# Patient Record
Sex: FEMALE | Race: OTHER | NOT HISPANIC OR LATINO | URBAN - METROPOLITAN AREA
[De-identification: names, ages, dates, MRNs, and addresses within clinical notes are randomized per-mention and may not be internally consistent; named-entity substitution may affect disease eponyms.]

---

## 2020-07-14 ENCOUNTER — INPATIENT (INPATIENT)
Facility: HOSPITAL | Age: 80
LOS: 13 days | Discharge: HOME HEALTH | DRG: 291 | End: 2020-07-28
Attending: HOSPITALIST | Admitting: STUDENT IN AN ORGANIZED HEALTH CARE EDUCATION/TRAINING PROGRAM
Payer: COMMERCIAL

## 2020-07-14 VITALS
OXYGEN SATURATION: 84 % | HEIGHT: 65 IN | TEMPERATURE: 98 F | SYSTOLIC BLOOD PRESSURE: 90 MMHG | DIASTOLIC BLOOD PRESSURE: 54 MMHG | HEART RATE: 82 BPM | RESPIRATION RATE: 24 BRPM

## 2020-07-14 PROCEDURE — 99053 MED SERV 10PM-8AM 24 HR FAC: CPT

## 2020-07-14 PROCEDURE — 99291 CRITICAL CARE FIRST HOUR: CPT

## 2020-07-14 PROCEDURE — 71045 X-RAY EXAM CHEST 1 VIEW: CPT | Mod: 26

## 2020-07-14 PROCEDURE — 93010 ELECTROCARDIOGRAM REPORT: CPT

## 2020-07-14 RX ORDER — PIPERACILLIN AND TAZOBACTAM 4; .5 G/20ML; G/20ML
3.38 INJECTION, POWDER, LYOPHILIZED, FOR SOLUTION INTRAVENOUS ONCE
Refills: 0 | Status: COMPLETED | OUTPATIENT
Start: 2020-07-14 | End: 2020-07-14

## 2020-07-14 NOTE — ED ADULT NURSE NOTE - NSIMPLEMENTINTERV_GEN_ALL_ED
Implemented All Fall Risk Interventions:  [place] to call system. Call bell, personal items and telephone within reach. Instruct patient to call for assistance. Room bathroom lighting operational. Non-slip footwear when patient is off stretcher. Physically safe environment: no spills, clutter or unnecessary equipment. Stretcher in lowest position, wheels locked, appropriate side rails in place. Provide visual cue, wrist band, yellow gown, etc. Monitor gait and stability. Monitor for mental status changes and reorient to person, place, and time. Review medications for side effects contributing to fall risk. Reinforce activity limits and safety measures with patient and family.

## 2020-07-14 NOTE — ED PROVIDER NOTE - PROGRESS NOTE DETAILS
Patient more alert, but agitated on BiPap; now able to say some words in her language. MICU consulted given persistent encephalopathy and agitation on BiPaP. Attempted to call son x 2 to discuss advance directives/goals of care but number on file reaches his voicemail. accepted by MICU requesting second covid swab

## 2020-07-14 NOTE — ED PROVIDER NOTE - OBJECTIVE STATEMENT
81 y/o F hx of COPD, DM, schizoaffective, HTN, ?CHF presenting with altered mental status.    History obtained by son at bedside. Patient at baseline dependent on ADLs, but today was more lethargic and altered and could not respond appropriately or ambulate. No fevers, chills as per son.     Medications: Metformin, lisinopril, spironolactone, Toprol, risperidal, seroquel, triphenyxamine. 79 y/o F hx of COPD, DM, schizoaffective, HTN, CHF presenting with altered mental status.    History obtained by son at bedside. Patient at baseline dependent on ADLs, but today was more lethargic and altered and could not respond appropriately or ambulate. No fevers, chills as per son, N/V, diarrhea, dysuria or cough.     Medications: Metformin, lisinopril, spironolactone, Toprol, risperidal, seroquel, triphenyxamine.

## 2020-07-14 NOTE — ED PROVIDER NOTE - PHYSICAL EXAMINATION
GENERAL: obtunded; grimaces to painful stimuli  HEAD:  Atraumatic, Normocephalic  EYES: resists opening of eyes   ENT: MMM; oropharynx clear  NECK: Supple, No JVD  CHEST/LUNG: Bibasilar rales.   HEART: Regular rate and rhythm; No murmurs, rubs, or gallops  ABDOMEN: Soft, +distensoin, midline laparatomy incision.   EXTREMITIES:  2+ Peripheral Pulses, trace LE edema.   PSYCH: AAOx0  NEUROLOGY: moves all extremities to painful stimuli   SKIN: No rashes or lesions

## 2020-07-14 NOTE — ED PROVIDER NOTE - PMH
Congestive heart failure    COPD (chronic obstructive pulmonary disease)    Diabetes mellitus    Hypertension    Schizoaffective disorder

## 2020-07-14 NOTE — ED ADULT NURSE NOTE - OBJECTIVE STATEMENT
Pt is a 80 Y legally blind F with hx of COPD on 2 L NC, HTN, DM, CHF presenting to ED from home accompanied by son with c/o alerted mental status since this AM. Pt Farsi speaking. Pt arrives to ED breathing unlabored on 2 L NC. Abd distended, non-tender. Skin is warm and dry. No diaphoresis noted. + peripheral pulses. Pt afebrile rectally. 18 G IV established to L AC. Pt placed on continuous cardiac/pulse ox/BP monitoring. Pt diaper found to be soiled. Pt cleaned, new diaper/linens applied. Safety and comfort maintained.

## 2020-07-14 NOTE — ED PROVIDER NOTE - CLINICAL SUMMARY MEDICAL DECISION MAKING FREE TEXT BOX
79 y/o F hx of COPD, DM, schizoaffective, HTN, ?CHF presenting with altered mental status.  Suspect sepsis, oversedation from psychotropic medications, intracranial insult or other metabolic derangement.  Plan for EKG, screening labs including tox, panculture, CXR, CT head & a/p and admit.  Low threshold for MICU consult if clinically worsening.

## 2020-07-14 NOTE — ED PROVIDER NOTE - CARE PLAN
Principal Discharge DX:	Acute on chronic congestive heart failure, unspecified heart failure type  Secondary Diagnosis:	Hypercarbia  Secondary Diagnosis:	Closed nondisplaced fracture of posterior arch of first cervical vertebra, initial encounter

## 2020-07-14 NOTE — ED PROVIDER NOTE - ATTENDING CONTRIBUTION TO CARE
pt presented with AMS, generalized weakness for at least 12 hrs (out of tpa window). on exam, non-focal  neuro exam without deficits, but pt somnolent and somewhat altered. initially hypoxic on RA, corrected with NC. workup significant for hypercarbia and resp acidosis - placed on BIPAP with improvement of MS. CT head shows age indeterminate C1 fx - pt placed in c-collar and had dedicated cervical spine CT done. NS evaluated and recommends no emergent intervention. repeat blood gas with improving hypercarbia. CT chest shows effusions and some edema. bnp very elevated. likely chf component along with hypercarbia. will admit - awiating MICU eval.

## 2020-07-14 NOTE — ED PROVIDER NOTE - SECONDARY DIAGNOSIS.
Hypercarbia Closed nondisplaced fracture of posterior arch of first cervical vertebra, initial encounter

## 2020-07-15 DIAGNOSIS — I50.9 HEART FAILURE, UNSPECIFIED: ICD-10-CM

## 2020-07-15 DIAGNOSIS — Z98.890 OTHER SPECIFIED POSTPROCEDURAL STATES: Chronic | ICD-10-CM

## 2020-07-15 LAB
-  COAGULASE NEGATIVE STAPHYLOCOCCUS: SIGNIFICANT CHANGE UP
ALBUMIN SERPL ELPH-MCNC: 2.7 G/DL — LOW (ref 3.3–5)
ALBUMIN SERPL ELPH-MCNC: 2.9 G/DL — LOW (ref 3.3–5)
ALBUMIN SERPL ELPH-MCNC: 3 G/DL — LOW (ref 3.3–5)
ALP SERPL-CCNC: 80 U/L — SIGNIFICANT CHANGE UP (ref 40–120)
ALP SERPL-CCNC: 86 U/L — SIGNIFICANT CHANGE UP (ref 40–120)
ALP SERPL-CCNC: 86 U/L — SIGNIFICANT CHANGE UP (ref 40–120)
ALT FLD-CCNC: 13 U/L — SIGNIFICANT CHANGE UP (ref 10–45)
ALT FLD-CCNC: 13 U/L — SIGNIFICANT CHANGE UP (ref 10–45)
ALT FLD-CCNC: 17 U/L — SIGNIFICANT CHANGE UP (ref 10–45)
AMMONIA BLD-MCNC: 30 UMOL/L — SIGNIFICANT CHANGE UP (ref 11–55)
ANION GAP SERPL CALC-SCNC: 14 MMOL/L — SIGNIFICANT CHANGE UP (ref 5–17)
ANION GAP SERPL CALC-SCNC: 15 MMOL/L — SIGNIFICANT CHANGE UP (ref 5–17)
ANION GAP SERPL CALC-SCNC: 15 MMOL/L — SIGNIFICANT CHANGE UP (ref 5–17)
APAP SERPL-MCNC: <15 UG/ML — SIGNIFICANT CHANGE UP (ref 10–30)
APPEARANCE UR: CLEAR — SIGNIFICANT CHANGE UP
APTT BLD: 34.6 SEC — SIGNIFICANT CHANGE UP (ref 27.5–35.5)
AST SERPL-CCNC: 12 U/L — SIGNIFICANT CHANGE UP (ref 10–40)
AST SERPL-CCNC: 13 U/L — SIGNIFICANT CHANGE UP (ref 10–40)
AST SERPL-CCNC: 18 U/L — SIGNIFICANT CHANGE UP (ref 10–40)
BASOPHILS # BLD AUTO: 0.03 K/UL — SIGNIFICANT CHANGE UP (ref 0–0.2)
BASOPHILS # BLD AUTO: 0.05 K/UL — SIGNIFICANT CHANGE UP (ref 0–0.2)
BASOPHILS # BLD AUTO: 0.05 K/UL — SIGNIFICANT CHANGE UP (ref 0–0.2)
BASOPHILS NFR BLD AUTO: 0.3 % — SIGNIFICANT CHANGE UP (ref 0–2)
BASOPHILS NFR BLD AUTO: 0.5 % — SIGNIFICANT CHANGE UP (ref 0–2)
BASOPHILS NFR BLD AUTO: 0.5 % — SIGNIFICANT CHANGE UP (ref 0–2)
BILIRUB SERPL-MCNC: 0.1 MG/DL — LOW (ref 0.2–1.2)
BILIRUB SERPL-MCNC: 0.2 MG/DL — SIGNIFICANT CHANGE UP (ref 0.2–1.2)
BILIRUB SERPL-MCNC: 0.2 MG/DL — SIGNIFICANT CHANGE UP (ref 0.2–1.2)
BILIRUB UR-MCNC: NEGATIVE — SIGNIFICANT CHANGE UP
BUN SERPL-MCNC: 34 MG/DL — HIGH (ref 7–23)
BUN SERPL-MCNC: 34 MG/DL — HIGH (ref 7–23)
BUN SERPL-MCNC: 35 MG/DL — HIGH (ref 7–23)
CALCIUM SERPL-MCNC: 8.4 MG/DL — SIGNIFICANT CHANGE UP (ref 8.4–10.5)
CALCIUM SERPL-MCNC: 8.6 MG/DL — SIGNIFICANT CHANGE UP (ref 8.4–10.5)
CALCIUM SERPL-MCNC: 8.8 MG/DL — SIGNIFICANT CHANGE UP (ref 8.4–10.5)
CHLORIDE SERPL-SCNC: 104 MMOL/L — SIGNIFICANT CHANGE UP (ref 96–108)
CHLORIDE SERPL-SCNC: 104 MMOL/L — SIGNIFICANT CHANGE UP (ref 96–108)
CHLORIDE SERPL-SCNC: 105 MMOL/L — SIGNIFICANT CHANGE UP (ref 96–108)
CK MB CFR SERPL CALC: 2.3 NG/ML — SIGNIFICANT CHANGE UP (ref 0–3.8)
CK SERPL-CCNC: 27 U/L — SIGNIFICANT CHANGE UP (ref 25–170)
CO2 SERPL-SCNC: 22 MMOL/L — SIGNIFICANT CHANGE UP (ref 22–31)
CO2 SERPL-SCNC: 22 MMOL/L — SIGNIFICANT CHANGE UP (ref 22–31)
CO2 SERPL-SCNC: 24 MMOL/L — SIGNIFICANT CHANGE UP (ref 22–31)
COLOR SPEC: YELLOW — SIGNIFICANT CHANGE UP
CREAT SERPL-MCNC: 1.19 MG/DL — SIGNIFICANT CHANGE UP (ref 0.5–1.3)
CREAT SERPL-MCNC: 1.27 MG/DL — SIGNIFICANT CHANGE UP (ref 0.5–1.3)
CREAT SERPL-MCNC: 1.32 MG/DL — HIGH (ref 0.5–1.3)
DIFF PNL FLD: NEGATIVE — SIGNIFICANT CHANGE UP
EOSINOPHIL # BLD AUTO: 0.14 K/UL — SIGNIFICANT CHANGE UP (ref 0–0.5)
EOSINOPHIL # BLD AUTO: 0.27 K/UL — SIGNIFICANT CHANGE UP (ref 0–0.5)
EOSINOPHIL # BLD AUTO: 0.32 K/UL — SIGNIFICANT CHANGE UP (ref 0–0.5)
EOSINOPHIL NFR BLD AUTO: 1.4 % — SIGNIFICANT CHANGE UP (ref 0–6)
EOSINOPHIL NFR BLD AUTO: 2.5 % — SIGNIFICANT CHANGE UP (ref 0–6)
EOSINOPHIL NFR BLD AUTO: 3.2 % — SIGNIFICANT CHANGE UP (ref 0–6)
ETHANOL SERPL-MCNC: SIGNIFICANT CHANGE UP MG/DL (ref 0–10)
GAS PNL BLDA: SIGNIFICANT CHANGE UP
GAS PNL BLDV: SIGNIFICANT CHANGE UP
GAS PNL BLDV: SIGNIFICANT CHANGE UP
GLUCOSE BLDC GLUCOMTR-MCNC: 148 MG/DL — HIGH (ref 70–99)
GLUCOSE BLDC GLUCOMTR-MCNC: 154 MG/DL — HIGH (ref 70–99)
GLUCOSE SERPL-MCNC: 103 MG/DL — HIGH (ref 70–99)
GLUCOSE SERPL-MCNC: 112 MG/DL — HIGH (ref 70–99)
GLUCOSE SERPL-MCNC: 168 MG/DL — HIGH (ref 70–99)
GLUCOSE UR QL: NEGATIVE — SIGNIFICANT CHANGE UP
GRAM STN FLD: SIGNIFICANT CHANGE UP
HCT VFR BLD CALC: 33.3 % — LOW (ref 34.5–45)
HCT VFR BLD CALC: 35.3 % — SIGNIFICANT CHANGE UP (ref 34.5–45)
HCT VFR BLD CALC: 37 % — SIGNIFICANT CHANGE UP (ref 34.5–45)
HGB BLD-MCNC: 10.1 G/DL — LOW (ref 11.5–15.5)
HGB BLD-MCNC: 10.5 G/DL — LOW (ref 11.5–15.5)
HGB BLD-MCNC: 9.6 G/DL — LOW (ref 11.5–15.5)
IMM GRANULOCYTES NFR BLD AUTO: 0.5 % — SIGNIFICANT CHANGE UP (ref 0–1.5)
IMM GRANULOCYTES NFR BLD AUTO: 0.6 % — SIGNIFICANT CHANGE UP (ref 0–1.5)
IMM GRANULOCYTES NFR BLD AUTO: 0.6 % — SIGNIFICANT CHANGE UP (ref 0–1.5)
INR BLD: 1.09 RATIO — SIGNIFICANT CHANGE UP (ref 0.88–1.16)
KETONES UR-MCNC: NEGATIVE — SIGNIFICANT CHANGE UP
LEUKOCYTE ESTERASE UR-ACNC: NEGATIVE — SIGNIFICANT CHANGE UP
LIDOCAIN IGE QN: 28 U/L — SIGNIFICANT CHANGE UP (ref 7–60)
LYMPHOCYTES # BLD AUTO: 1 K/UL — SIGNIFICANT CHANGE UP (ref 1–3.3)
LYMPHOCYTES # BLD AUTO: 1.01 K/UL — SIGNIFICANT CHANGE UP (ref 1–3.3)
LYMPHOCYTES # BLD AUTO: 1.12 K/UL — SIGNIFICANT CHANGE UP (ref 1–3.3)
LYMPHOCYTES # BLD AUTO: 10.2 % — LOW (ref 13–44)
LYMPHOCYTES # BLD AUTO: 10.5 % — LOW (ref 13–44)
LYMPHOCYTES # BLD AUTO: 9.9 % — LOW (ref 13–44)
MAGNESIUM SERPL-MCNC: 1.5 MG/DL — LOW (ref 1.6–2.6)
MAGNESIUM SERPL-MCNC: 1.7 MG/DL — SIGNIFICANT CHANGE UP (ref 1.6–2.6)
MAGNESIUM SERPL-MCNC: 1.9 MG/DL — SIGNIFICANT CHANGE UP (ref 1.6–2.6)
MCHC RBC-ENTMCNC: 24.5 PG — LOW (ref 27–34)
MCHC RBC-ENTMCNC: 24.8 PG — LOW (ref 27–34)
MCHC RBC-ENTMCNC: 25.1 PG — LOW (ref 27–34)
MCHC RBC-ENTMCNC: 28.4 GM/DL — LOW (ref 32–36)
MCHC RBC-ENTMCNC: 28.6 GM/DL — LOW (ref 32–36)
MCHC RBC-ENTMCNC: 28.8 GM/DL — LOW (ref 32–36)
MCV RBC AUTO: 84.9 FL — SIGNIFICANT CHANGE UP (ref 80–100)
MCV RBC AUTO: 86.7 FL — SIGNIFICANT CHANGE UP (ref 80–100)
MCV RBC AUTO: 88.5 FL — SIGNIFICANT CHANGE UP (ref 80–100)
METHOD TYPE: SIGNIFICANT CHANGE UP
MONOCYTES # BLD AUTO: 0.81 K/UL — SIGNIFICANT CHANGE UP (ref 0–0.9)
MONOCYTES # BLD AUTO: 0.84 K/UL — SIGNIFICANT CHANGE UP (ref 0–0.9)
MONOCYTES # BLD AUTO: 0.86 K/UL — SIGNIFICANT CHANGE UP (ref 0–0.9)
MONOCYTES NFR BLD AUTO: 8.1 % — SIGNIFICANT CHANGE UP (ref 2–14)
MONOCYTES NFR BLD AUTO: 8.2 % — SIGNIFICANT CHANGE UP (ref 2–14)
MONOCYTES NFR BLD AUTO: 8.3 % — SIGNIFICANT CHANGE UP (ref 2–14)
NEUTROPHILS # BLD AUTO: 7.68 K/UL — HIGH (ref 1.8–7.4)
NEUTROPHILS # BLD AUTO: 8.03 K/UL — HIGH (ref 1.8–7.4)
NEUTROPHILS # BLD AUTO: 8.28 K/UL — HIGH (ref 1.8–7.4)
NEUTROPHILS NFR BLD AUTO: 77.6 % — HIGH (ref 43–77)
NEUTROPHILS NFR BLD AUTO: 77.8 % — HIGH (ref 43–77)
NEUTROPHILS NFR BLD AUTO: 79.3 % — HIGH (ref 43–77)
NITRITE UR-MCNC: NEGATIVE — SIGNIFICANT CHANGE UP
NRBC # BLD: 0 /100 WBCS — SIGNIFICANT CHANGE UP (ref 0–0)
NT-PROBNP SERPL-SCNC: HIGH PG/ML (ref 0–300)
PH UR: 5.5 — SIGNIFICANT CHANGE UP (ref 5–8)
PHOSPHATE SERPL-MCNC: 4.3 MG/DL — SIGNIFICANT CHANGE UP (ref 2.5–4.5)
PHOSPHATE SERPL-MCNC: 4.8 MG/DL — HIGH (ref 2.5–4.5)
PHOSPHATE SERPL-MCNC: 5.3 MG/DL — HIGH (ref 2.5–4.5)
PLATELET # BLD AUTO: 243 K/UL — SIGNIFICANT CHANGE UP (ref 150–400)
PLATELET # BLD AUTO: 261 K/UL — SIGNIFICANT CHANGE UP (ref 150–400)
PLATELET # BLD AUTO: 292 K/UL — SIGNIFICANT CHANGE UP (ref 150–400)
POTASSIUM SERPL-MCNC: 3.8 MMOL/L — SIGNIFICANT CHANGE UP (ref 3.5–5.3)
POTASSIUM SERPL-MCNC: 4 MMOL/L — SIGNIFICANT CHANGE UP (ref 3.5–5.3)
POTASSIUM SERPL-MCNC: 4.3 MMOL/L — SIGNIFICANT CHANGE UP (ref 3.5–5.3)
POTASSIUM SERPL-SCNC: 3.8 MMOL/L — SIGNIFICANT CHANGE UP (ref 3.5–5.3)
POTASSIUM SERPL-SCNC: 4 MMOL/L — SIGNIFICANT CHANGE UP (ref 3.5–5.3)
POTASSIUM SERPL-SCNC: 4.3 MMOL/L — SIGNIFICANT CHANGE UP (ref 3.5–5.3)
PROT SERPL-MCNC: 5.9 G/DL — LOW (ref 6–8.3)
PROT SERPL-MCNC: 6.2 G/DL — SIGNIFICANT CHANGE UP (ref 6–8.3)
PROT SERPL-MCNC: 6.4 G/DL — SIGNIFICANT CHANGE UP (ref 6–8.3)
PROT UR-MCNC: ABNORMAL
PROTHROM AB SERPL-ACNC: 12.9 SEC — SIGNIFICANT CHANGE UP (ref 10.6–13.6)
RBC # BLD: 3.92 M/UL — SIGNIFICANT CHANGE UP (ref 3.8–5.2)
RBC # BLD: 4.07 M/UL — SIGNIFICANT CHANGE UP (ref 3.8–5.2)
RBC # BLD: 4.18 M/UL — SIGNIFICANT CHANGE UP (ref 3.8–5.2)
RBC # FLD: 16.7 % — HIGH (ref 10.3–14.5)
RBC # FLD: 16.8 % — HIGH (ref 10.3–14.5)
RBC # FLD: 16.8 % — HIGH (ref 10.3–14.5)
SALICYLATES SERPL-MCNC: <2 MG/DL — LOW (ref 15–30)
SARS-COV-2 IGG SERPL QL IA: NEGATIVE — SIGNIFICANT CHANGE UP
SARS-COV-2 IGM SERPL IA-ACNC: <3.8 AU/ML — SIGNIFICANT CHANGE UP
SARS-COV-2 RNA SPEC QL NAA+PROBE: SIGNIFICANT CHANGE UP
SARS-COV-2 RNA SPEC QL NAA+PROBE: SIGNIFICANT CHANGE UP
SODIUM SERPL-SCNC: 140 MMOL/L — SIGNIFICANT CHANGE UP (ref 135–145)
SODIUM SERPL-SCNC: 142 MMOL/L — SIGNIFICANT CHANGE UP (ref 135–145)
SODIUM SERPL-SCNC: 143 MMOL/L — SIGNIFICANT CHANGE UP (ref 135–145)
SP GR SPEC: 1.02 — SIGNIFICANT CHANGE UP (ref 1.01–1.02)
SPECIMEN SOURCE: SIGNIFICANT CHANGE UP
T3 SERPL-MCNC: 51 NG/DL — LOW (ref 80–200)
TROPONIN T, HIGH SENSITIVITY RESULT: 35 NG/L — SIGNIFICANT CHANGE UP (ref 0–51)
TROPONIN T, HIGH SENSITIVITY RESULT: 39 NG/L — SIGNIFICANT CHANGE UP (ref 0–51)
TROPONIN T, HIGH SENSITIVITY RESULT: 43 NG/L — SIGNIFICANT CHANGE UP (ref 0–51)
UROBILINOGEN FLD QL: NEGATIVE — SIGNIFICANT CHANGE UP
WBC # BLD: 10.12 K/UL — SIGNIFICANT CHANGE UP (ref 3.8–10.5)
WBC # BLD: 10.64 K/UL — HIGH (ref 3.8–10.5)
WBC # BLD: 9.9 K/UL — SIGNIFICANT CHANGE UP (ref 3.8–10.5)
WBC # FLD AUTO: 10.12 K/UL — SIGNIFICANT CHANGE UP (ref 3.8–10.5)
WBC # FLD AUTO: 10.64 K/UL — HIGH (ref 3.8–10.5)
WBC # FLD AUTO: 9.9 K/UL — SIGNIFICANT CHANGE UP (ref 3.8–10.5)

## 2020-07-15 PROCEDURE — 71260 CT THORAX DX C+: CPT | Mod: 26

## 2020-07-15 PROCEDURE — 99291 CRITICAL CARE FIRST HOUR: CPT | Mod: 25

## 2020-07-15 PROCEDURE — 76604 US EXAM CHEST: CPT | Mod: 26,GC

## 2020-07-15 PROCEDURE — 72125 CT NECK SPINE W/O DYE: CPT | Mod: 26

## 2020-07-15 PROCEDURE — 99291 CRITICAL CARE FIRST HOUR: CPT

## 2020-07-15 PROCEDURE — 74177 CT ABD & PELVIS W/CONTRAST: CPT | Mod: 26

## 2020-07-15 PROCEDURE — 70450 CT HEAD/BRAIN W/O DYE: CPT | Mod: 26

## 2020-07-15 PROCEDURE — 93308 TTE F-UP OR LMTD: CPT | Mod: 26,GC

## 2020-07-15 RX ORDER — DEXTROSE 50 % IN WATER 50 %
12.5 SYRINGE (ML) INTRAVENOUS ONCE
Refills: 0 | Status: DISCONTINUED | OUTPATIENT
Start: 2020-07-15 | End: 2020-07-22

## 2020-07-15 RX ORDER — BACITRACIN ZINC 500 UNIT/G
1 OINTMENT IN PACKET (EA) TOPICAL ONCE
Refills: 0 | Status: COMPLETED | OUTPATIENT
Start: 2020-07-15 | End: 2020-07-15

## 2020-07-15 RX ORDER — PANTOPRAZOLE SODIUM 20 MG/1
40 TABLET, DELAYED RELEASE ORAL DAILY
Refills: 0 | Status: DISCONTINUED | OUTPATIENT
Start: 2020-07-15 | End: 2020-07-22

## 2020-07-15 RX ORDER — GLUCAGON INJECTION, SOLUTION 0.5 MG/.1ML
1 INJECTION, SOLUTION SUBCUTANEOUS ONCE
Refills: 0 | Status: DISCONTINUED | OUTPATIENT
Start: 2020-07-15 | End: 2020-07-22

## 2020-07-15 RX ORDER — INSULIN LISPRO 100/ML
VIAL (ML) SUBCUTANEOUS AT BEDTIME
Refills: 0 | Status: DISCONTINUED | OUTPATIENT
Start: 2020-07-15 | End: 2020-07-15

## 2020-07-15 RX ORDER — CHLORHEXIDINE GLUCONATE 213 G/1000ML
1 SOLUTION TOPICAL
Refills: 0 | Status: DISCONTINUED | OUTPATIENT
Start: 2020-07-15 | End: 2020-07-19

## 2020-07-15 RX ORDER — AZITHROMYCIN 500 MG/1
500 TABLET, FILM COATED ORAL ONCE
Refills: 0 | Status: COMPLETED | OUTPATIENT
Start: 2020-07-15 | End: 2020-07-15

## 2020-07-15 RX ORDER — DEXTROSE 50 % IN WATER 50 %
25 SYRINGE (ML) INTRAVENOUS ONCE
Refills: 0 | Status: DISCONTINUED | OUTPATIENT
Start: 2020-07-15 | End: 2020-07-22

## 2020-07-15 RX ORDER — ALBUTEROL 90 UG/1
0 AEROSOL, METERED ORAL
Qty: 0 | Refills: 0 | DISCHARGE

## 2020-07-15 RX ORDER — FUROSEMIDE 40 MG
40 TABLET ORAL ONCE
Refills: 0 | Status: COMPLETED | OUTPATIENT
Start: 2020-07-15 | End: 2020-07-15

## 2020-07-15 RX ORDER — DEXMEDETOMIDINE HYDROCHLORIDE IN 0.9% SODIUM CHLORIDE 4 UG/ML
0.2 INJECTION INTRAVENOUS
Qty: 200 | Refills: 0 | Status: DISCONTINUED | OUTPATIENT
Start: 2020-07-15 | End: 2020-07-17

## 2020-07-15 RX ORDER — VANCOMYCIN HCL 1 G
1000 VIAL (EA) INTRAVENOUS ONCE
Refills: 0 | Status: COMPLETED | OUTPATIENT
Start: 2020-07-15 | End: 2020-07-15

## 2020-07-15 RX ORDER — MAGNESIUM SULFATE 500 MG/ML
2 VIAL (ML) INJECTION ONCE
Refills: 0 | Status: COMPLETED | OUTPATIENT
Start: 2020-07-15 | End: 2020-07-15

## 2020-07-15 RX ORDER — CEFEPIME 1 G/1
INJECTION, POWDER, FOR SOLUTION INTRAMUSCULAR; INTRAVENOUS
Refills: 0 | Status: DISCONTINUED | OUTPATIENT
Start: 2020-07-15 | End: 2020-07-15

## 2020-07-15 RX ORDER — HALOPERIDOL DECANOATE 100 MG/ML
1 INJECTION INTRAMUSCULAR EVERY 4 HOURS
Refills: 0 | Status: DISCONTINUED | OUTPATIENT
Start: 2020-07-15 | End: 2020-07-17

## 2020-07-15 RX ORDER — INSULIN LISPRO 100/ML
VIAL (ML) SUBCUTANEOUS
Refills: 0 | Status: DISCONTINUED | OUTPATIENT
Start: 2020-07-15 | End: 2020-07-15

## 2020-07-15 RX ORDER — NOREPINEPHRINE BITARTRATE/D5W 8 MG/250ML
0.05 PLASTIC BAG, INJECTION (ML) INTRAVENOUS
Qty: 8 | Refills: 0 | Status: DISCONTINUED | OUTPATIENT
Start: 2020-07-15 | End: 2020-07-17

## 2020-07-15 RX ORDER — VANCOMYCIN HCL 1 G
1000 VIAL (EA) INTRAVENOUS DAILY
Refills: 0 | Status: DISCONTINUED | OUTPATIENT
Start: 2020-07-15 | End: 2020-07-15

## 2020-07-15 RX ORDER — SODIUM CHLORIDE 9 MG/ML
1000 INJECTION, SOLUTION INTRAVENOUS
Refills: 0 | Status: DISCONTINUED | OUTPATIENT
Start: 2020-07-15 | End: 2020-07-22

## 2020-07-15 RX ORDER — DEXTROSE 50 % IN WATER 50 %
15 SYRINGE (ML) INTRAVENOUS ONCE
Refills: 0 | Status: DISCONTINUED | OUTPATIENT
Start: 2020-07-15 | End: 2020-07-22

## 2020-07-15 RX ORDER — INSULIN LISPRO 100/ML
VIAL (ML) SUBCUTANEOUS EVERY 6 HOURS
Refills: 0 | Status: DISCONTINUED | OUTPATIENT
Start: 2020-07-15 | End: 2020-07-22

## 2020-07-15 RX ORDER — CEFTRIAXONE 500 MG/1
1000 INJECTION, POWDER, FOR SOLUTION INTRAMUSCULAR; INTRAVENOUS EVERY 24 HOURS
Refills: 0 | Status: DISCONTINUED | OUTPATIENT
Start: 2020-07-15 | End: 2020-07-17

## 2020-07-15 RX ORDER — CEFEPIME 1 G/1
2000 INJECTION, POWDER, FOR SOLUTION INTRAMUSCULAR; INTRAVENOUS EVERY 12 HOURS
Refills: 0 | Status: DISCONTINUED | OUTPATIENT
Start: 2020-07-15 | End: 2020-07-15

## 2020-07-15 RX ORDER — CEFEPIME 1 G/1
2000 INJECTION, POWDER, FOR SOLUTION INTRAMUSCULAR; INTRAVENOUS ONCE
Refills: 0 | Status: DISCONTINUED | OUTPATIENT
Start: 2020-07-15 | End: 2020-07-15

## 2020-07-15 RX ADMIN — PANTOPRAZOLE SODIUM 40 MILLIGRAM(S): 20 TABLET, DELAYED RELEASE ORAL at 14:04

## 2020-07-15 RX ADMIN — Medication 1 APPLICATION(S): at 21:24

## 2020-07-15 RX ADMIN — HALOPERIDOL DECANOATE 1 MILLIGRAM(S): 100 INJECTION INTRAMUSCULAR at 09:23

## 2020-07-15 RX ADMIN — CHLORHEXIDINE GLUCONATE 1 APPLICATION(S): 213 SOLUTION TOPICAL at 09:55

## 2020-07-15 RX ADMIN — AZITHROMYCIN 250 MILLIGRAM(S): 500 TABLET, FILM COATED ORAL at 09:23

## 2020-07-15 RX ADMIN — PIPERACILLIN AND TAZOBACTAM 200 GRAM(S): 4; .5 INJECTION, POWDER, LYOPHILIZED, FOR SOLUTION INTRAVENOUS at 00:07

## 2020-07-15 RX ADMIN — CEFTRIAXONE 100 MILLIGRAM(S): 500 INJECTION, POWDER, FOR SOLUTION INTRAMUSCULAR; INTRAVENOUS at 09:26

## 2020-07-15 RX ADMIN — Medication 250 MILLIGRAM(S): at 21:59

## 2020-07-15 RX ADMIN — Medication 40 MILLIGRAM(S): at 01:44

## 2020-07-15 RX ADMIN — PIPERACILLIN AND TAZOBACTAM 3.38 GRAM(S): 4; .5 INJECTION, POWDER, LYOPHILIZED, FOR SOLUTION INTRAVENOUS at 01:37

## 2020-07-15 RX ADMIN — Medication 50 GRAM(S): at 02:22

## 2020-07-15 RX ADMIN — Medication 40 MILLIGRAM(S): at 09:33

## 2020-07-15 RX ADMIN — Medication 2 GRAM(S): at 03:22

## 2020-07-15 NOTE — H&P ADULT - NSHPLABSRESULTS_GEN_ALL_CORE
10.5   10.64 )-----------( 292      ( 14 Jul 2020 23:35 )             37.0       07-14    142  |  105  |  35<H>  ----------------------------<  168<H>  4.3   |  22  |  1.32<H>    Ca    8.4      14 Jul 2020 23:35  Phos  5.3     07-14  Mg     1.5     07-14    TPro  6.2  /  Alb  2.9<L>  /  TBili  0.2  /  DBili  x   /  AST  13  /  ALT  17  /  AlkPhos  86  07-14      Urinalysis (07.15.20 @ 00:34)    pH Urine: 5.5    Glucose Qualitative, Urine: Negative    Blood, Urine: Negative    Color: Yellow    Urine Appearance: Clear    Bilirubin: Negative    Ketone - Urine: Negative    Specific Gravity: 1.022    Protein, Urine: 30 mg/dL    Urobilinogen: Negative    Nitrite: Negative    Leukocyte Esterase Concentration: Negative      EXAM:  XR CHEST PORTABLE URGENT 1V                        PROCEDURE DATE:  07/14/2020      INTERPRETATION:  Bilateral patchy opacities may represent multifocal pneumonia or pulmonary edema.  Bilateral pleural effusions.        EXAM:  CT BRAIN                        PROCEDURE DATE:  07/15/2020      IMPRESSION:   No acute intracranial hemorrhage, large cortical infarct or mass effect. Somewhat eccentric lucency in the right cerebellum, which be due to artifact although infarct cannot be excluded.If clinically indicated, short-term follow-up or MRI may be obtained for further evaluation.  Partially visualized, age-indeterminate displaced fracture of the left C1 anterior and posterior arch. Asymmetry of the lateral atlantodental space. Recommend comparison to prior outside study. Dedicated cervical spine imaging would be helpful for further evaluation of fracture/ligamentous injury. Discussed with Dr. Aguirre.        EXAM:  CT CERVICAL SPINE                        PROCEDURE DATE:  07/15/2020      IMPRESSION:   Age-indeterminate, displaced left C1 anterior and posterior arch fracture as described, which may be acute given mild prevertebral soft tissue stranding/edema and no significant sclerosis at the fracture margin. Question mildly increased attenuation surrounding the thecal sac in the spinal canal. Extra-axial/epidural hematoma cannot be excluded. MRI is recommended for further evaluation of ligamentous injury and extra-axial hematoma.    Nonspecific bilateral mastoid opacification. Recommend clinical correlation to assess acute mastoiditis.      EXAM:  CT ABDOMEN AND PELVIS IC                        EXAM:  CT CHEST IC    IMPRESSION:   Small bilateral pleural effusion with atelectasis. Cardiomegaly. Nonspecific interlobular septal thickening and groundglass/patchy opacities in both lungs, which can be seen in pulmonaryedema. Recommend clinical correlation to assess underlying pneumonia.     No bowel obstruction. Abdominal wall hernias as described.    Nonspecific mild bilateral perinephric stranding without hydroureteronephrosis. Diffuse prominent bladder wall with nonspecific perivesical stranding. Recommend clinical correlation to assess urinary tract infection.    Nonspecific stranding in the right lateral abdominal wall soft tissue, which may represent edema and/or cellulitis. Recommend direct visualization.    Additional findings as described.

## 2020-07-15 NOTE — CHART NOTE - NSCHARTNOTEFT_GEN_A_CORE
As reported by son, patient has blindness for the past few years. Will confirm further details with son tomorrow. As reported by son, patient has blindness in both eyes for the past 15 years. She fell down while in the hospital and damaged her eyes, leading to surgery, unsuccessful, from one eye damage progressively to the other eye.

## 2020-07-15 NOTE — CONSULT NOTE ADULT - SUBJECTIVE AND OBJECTIVE BOX
p (9210)     HPI: 81 y/o F hx of COPD, DM, schizoaffective, HTN, CHF presenting with altered mental status.    	History obtained by son at bedside. Patient at baseline dependent on ADLs, but today was more lethargic and altered and could not respond appropriately or ambulate. No fevers, chills as per son, N/V, diarrhea, dysuria or cough.      Imaging:    Exam: In collar, EO nox, not FC, GROVE spontaneously and purposefully.    --Anticoagulation:    =====================  PAST MEDICAL HISTORY   COPD (chronic obstructive pulmonary disease)  Hypertension  Schizoaffective disorder  Diabetes mellitus  Congestive heart failure    PAST SURGICAL HISTORY         MEDICATIONS:  Antibiotics:    Neuro:    Other:      SOCIAL HISTORY:   Occupation:   Marital Status:     FAMILY HISTORY:      ROS: Negative except per HPI    LABS:                          10.5   10.64 )-----------( 292      ( 14 Jul 2020 23:35 )             37.0     07-14    142  |  105  |  35<H>  ----------------------------<  168<H>  4.3   |  22  |  1.32<H>    Ca    8.4      14 Jul 2020 23:35  Phos  5.3     07-14  Mg     1.5     07-14    TPro  6.2  /  Alb  2.9<L>  /  TBili  0.2  /  DBili  x   /  AST  13  /  ALT  17  /  AlkPhos  86  07-14

## 2020-07-15 NOTE — H&P ADULT - NSICDXPASTMEDICALHX_GEN_ALL_CORE_FT
PAST MEDICAL HISTORY:  Congestive heart failure     COPD (chronic obstructive pulmonary disease)     Diabetes mellitus     Hypertension     Schizoaffective disorder

## 2020-07-15 NOTE — CHART NOTE - NSCHARTNOTEFT_GEN_A_CORE
: Yuriy Vilchis    INDICATION: Hypercapnic respiratory failure    PROCEDURE:  [x ] LIMITED ECHO  [x ] LIMITED CHEST  [ ] LIMITED RETROPERITONEAL  [ ] LIMITED ABDOMINAL  [ ] LIMITED DVT  [ ] NEEDLE GUIDANCE VASCULAR  [ ] NEEDLE GUIDANCE THORACENTESIS  [ ] NEEDLE GUIDANCE PARACENTESIS  [ ] NEEDLE GUIDANCE PERICARDIOCENTESIS  [ ] OTHER    FINDINGS: A line predominant pattern in anteriorly. Small right base consolidation with small pleural effusion. No consolidation or pleural effusion seen in left side. Normal LV systolic function. RV smaller than LV. Mild tricuspid regurgitation. Mild aortic regurgitation. E 98 cm/s. A 114 cm/s. Lateral e' 9.90 cm/s. E/e' 9.89.       INTERPRETATION: Small right base consolidation with small pleural effusion. Normal LV systolic function. Signs of diastolic dysfunction.     --------------------------------------------------------  Yuriy Lemus, PGY-5  Pulmonary/Critical Care Fellow  Pager: [phone] ([place]), [phone] ([place]) : Yuriy Vilchis Craig    INDICATION: Hypercapnic respiratory failure    PROCEDURE:  [x ] LIMITED ECHO  [x ] LIMITED CHEST  [ ] LIMITED RETROPERITONEAL  [ ] LIMITED ABDOMINAL  [ ] LIMITED DVT  [ ] NEEDLE GUIDANCE VASCULAR  [ ] NEEDLE GUIDANCE THORACENTESIS  [ ] NEEDLE GUIDANCE PARACENTESIS  [ ] NEEDLE GUIDANCE PERICARDIOCENTESIS  [ ] OTHER    FINDINGS: A line predominant pattern in anteriorly. Small right base consolidation with small pleural effusion. No consolidation or pleural effusion seen in left side. Normal LV systolic function. RV smaller than LV. Mild tricuspid regurgitation. Mild aortic regurgitation. E 98 cm/s. A 114 cm/s. Lateral e' 9.90 cm/s. E/e' 9.89.       INTERPRETATION: Small right base consolidation with small pleural effusion. Normal LV systolic function. Signs of diastolic dysfunction.     --------------------------------------------------------  Yuriy Lemus, PGY-5  Pulmonary/Critical Care Fellow  Pager: [phone] ([facility]), [phone] ([place])    Images stores on [employer]    Attending Attestation:    I was present during the key portions of the procedure and immediately available during the entire procedure.    Shawn Ochoa MD  Attending  Pulmonary & Critical Care Medicine : Yuriy Vilchis Craig    INDICATION: Hypercapnic respiratory failure    PROCEDURE:  [x ] LIMITED ECHO  [x ] LIMITED CHEST  [ ] LIMITED RETROPERITONEAL  [ ] LIMITED ABDOMINAL  [ ] LIMITED DVT  [ ] NEEDLE GUIDANCE VASCULAR  [ ] NEEDLE GUIDANCE THORACENTESIS  [ ] NEEDLE GUIDANCE PARACENTESIS  [ ] NEEDLE GUIDANCE PERICARDIOCENTESIS  [ ] OTHER    FINDINGS: A line predominant pattern anteriorly. Small right base consolidation with small pleural effusion. No consolidation or pleural effusion seen in left side. Limited views given cervical collar but LV systolic function appears to be reduced. RV smaller than LV. Mild tricuspid regurgitation. Mild aortic regurgitation. E 98 cm/s. A 114 cm/s. Lateral e' 9.90 cm/s. E/e' 9.89.       INTERPRETATION: Small right base consolidation with small pleural effusion. Reduced LV systolic function. Signs of diastolic dysfunction.     --------------------------------------------------------  Yuriy Lemus, PGY-5  Pulmonary/Critical Care Fellow  Pager: [phone] ([facility]), [phone] ([place])    Images stores on [employer]    Attending Attestation:    I was present during the key portions of the procedure and immediately available during the entire procedure.    Shawn Ochoa MD  Attending  Pulmonary & Critical Care Medicine : Yuriy Vilchis Craig    INDICATION: Hypercapnic respiratory failure    PROCEDURE:  [x ] LIMITED ECHO  [x ] LIMITED CHEST  [ ] LIMITED RETROPERITONEAL  [ ] LIMITED ABDOMINAL  [ ] LIMITED DVT  [ ] NEEDLE GUIDANCE VASCULAR  [ ] NEEDLE GUIDANCE THORACENTESIS  [ ] NEEDLE GUIDANCE PARACENTESIS  [ ] NEEDLE GUIDANCE PERICARDIOCENTESIS  [ ] OTHER    FINDINGS: A line predominant pattern anteriorly. Small right base consolidation with small pleural effusion. No consolidation or pleural effusion seen in left side. LV systolic function appears to be preserved however limited views given cervical collar. RV smaller than LV. Mild tricuspid regurgitation. Mild aortic regurgitation. E 98 cm/s. A 114 cm/s. Lateral e' 9.90 cm/s. E/e' 9.89.       INTERPRETATION: Small right base consolidation with small pleural effusion. Signs of diastolic dysfunction.     --------------------------------------------------------  Yuriy Lemus, PGY-5  Pulmonary/Critical Care Fellow  Pager: [phone] ([facility]), [phone] ([place])    Images stores on [employer]    Attending Attestation:    I was present during the key portions of the procedure and immediately available during the entire procedure.    Shawn Ochoa MD  Attending  Pulmonary & Critical Care Medicine

## 2020-07-15 NOTE — H&P ADULT - ASSESSMENT
80F PMHx COPD (unknown level O2 at home), DM2, CHF, schizoaffective disorder brought in by relative due to altered mental status.     #Neuro  **AMS  - Intermittently agitated, reportedly A&Ox1 at baseline, pulling at BIPAP mask  - Precedex for agitation    **Schizoaffective disorder  - Home meds include risperdal, seroquel; will hold for now    #Respiratory  **Hypercarbic respiratory failure  - On admission, O2 sat 84% on RA, improving with NC/BIPAP  - Initial VBG 7.17/80/32/28, improved to 7.2/64/22/24  - To be admitted to MICU for continued BIPAP/respiratory support    **PNA  - CXR demonstrating b/l patchy opacities indicating multifocal PNA or pulm edema; b/l pleural effusions  - CT demonstrating ground glass opacities b/l  - S/p lasix 40 IV, s/p zosyn x 1  - COVID negative x 1  - F/u repeat COVID swab  - Given b/l patchy opacities, will continue azithromycin, vancomycin (by level given MARIAN), cefepime    #Cardiovascular  **CHF  - Admission EKG demonstrating NSR with LAD/nonspecific T wave abnormalities  - BMP [id number]  - S/p Lasix 40 IV  - Clinically euvolemic, will diurese as indicated    #GI  - No acute issues  - Pantoprazole prophylaxis - 40 IV daily    #/Renal  **MARIAN  - On admission, Cr 1.32, baseline unknown (patient has no other records aside from this admission)  - Trend BMP  - Gentle hydration if indicated due to HF    #Endocrine  - No acute issues  - Will f/u thyroid studies    #ID  **PNA  - As above (see Respiratory)  - F/u RVP, legionella  - F/u repeat COVID    **Sepsis  - Tachypnea + mild leukocytosis with suspected pulmonary source  - UA unremarkable  - F/u Bcx, Ucx  - Abx as above    #Heme/Onc  - Hgb 10.5 (baseline unknown)  - Trend CBC    #DVT Prophylaxis  - Heparin SubQ 2/2 MARIAN    #GOC  - Will attempt to contact son to determine code status 80F PMHx COPD (unknown level O2 at home), DM2, CHF, schizoaffective disorder brought in by relative due to altered mental status.     #Neuro  **AMS  - Intermittently agitated, reportedly A&Ox1 at baseline, pulling at BIPAP mask  - Precedex for agitation  - Haldol 1-2g IV PRN    **Schizoaffective disorder  - Home meds include risperdal, seroquel; med rec with son/family member    #Respiratory  **Hypercarbic respiratory failure, in the setting of possible COPD exacerbation, CHF exacerbation  - On admission, O2 sat 84% on RA, improving with BIPAP  - Initial VBG 7.17/80/32/28, improved to 7.2/64/22/24  - To be admitted to MICU for continued BIPAP/respiratory support    **Pulmonary edema, in the setting of suspected CHF exacerbation  - CXR demonstrating b/l patchy opacities indicating pulm edema; b/l pleural effusions  - CT demonstrating ground glass opacities b/l  - S/p lasix 40 IV  - Give another dose of lasix 40 IV today and reassess  - Given b/l patchy opacities, will continue azithromycin, vancomycin (by level given MARIAN), cefepime    **PNA, low suspicion  - COVID negative x 2  - s/p zosyn x 1  - Start ceftriaxone and azithro x 5 days, given likely community-acquired  - F/u legionella  - F/u RVP    #Cardiovascular  **CHF exacerbation, suspected  - Admission EKG demonstrating NSR with LAD/nonspecific T wave abnormalities  - BMP [id number]  - S/p Lasix 40 IV  - Give another dose of lasix 40 IV today and reassess    #GI  - No acute issues  - Pantoprazole prophylaxis - 40 IV daily    #/Renal  **MARIAN  - On admission, Cr 1.32, baseline unknown (patient has no other records aside from this admission)  - Trend BMP    #Endocrine  - No acute issues  - f/u thyroid studies    #ID  **PNA  - COVID negative x 2  - s/p zosyn x 1  - Start ceftriaxone and azithro x 5 days, given likely community-acquired  - F/u legionella  - F/u RVP    - UA unremarkable  - F/u Bcx, Ucx    #Heme/Onc  - Hgb 10.5 (baseline unknown)  - Trend CBC    #DVT Prophylaxis  - Heparin SubQ 2/2 MARIAN    #GOC  - Will attempt to contact son to determine code status

## 2020-07-15 NOTE — ED ADULT NURSE REASSESSMENT NOTE - NS ED NURSE REASSESS COMMENT FT1
Patient straight cathed using sterile technique. Second RN present to confirm sterility. Patient tolerated procedure well. Output of approx. 500 cc's urine. Sterile specimens collected and sent to lab. Will cont to monitor.

## 2020-07-15 NOTE — H&P ADULT - NSHPPHYSICALEXAM_GEN_ALL_CORE
CONSTITUTIONAL: Mild distress 2/2 compromised respirations.  ENT: Audible gurgling. Upper and lower dentures visible in mouth (removed).  RESPIRATORY: Coarse lung sounds throughout compounded by BIPAP sounds; tachypneic with some mild respiratory distress. On BIPAP saturating %.  CARDIOVASCULAR: +S1/S2. No audible S3/S4. Regular rate and rhythm. No murmurs, rubs, or gallops.  GASTROINTESTINAL: Multiple visible abdominal hernias with healed midline scar.   BACK: Healing sacral ulcer.  EXTREMITY: 1+ pitting edema to anterior mid-shin.  MUSCULOSKELETAL: Spontaneous movement in all extremities.  NEUROLOGICAL: Unable to assess due to mental status and language barrier.  PSYCHIATRIC: Intermittently agitated.

## 2020-07-15 NOTE — PROGRESS NOTE ADULT - ASSESSMENT
80F PMHx COPD (unknown level O2 at home), DM2, CHF, schizoaffective disorder brought in by relative due to altered mental status.     #Neuro  **AMS  - Intermittently agitated, reportedly A&Ox1 at baseline, pulling at BIPAP mask  - Precedex for agitation  - Haldol 1-2g IV PRN    **Schizoaffective disorder  - Home meds include risperdal, seroquel; med rec with son/family member    #Respiratory  **Hypercarbic respiratory failure, in the setting of possible COPD exacerbation, CHF exacerbation  - On admission, O2 sat 84% on RA, improving with BIPAP  - Initial VBG 7.17/80/32/28, improved to 7.2/64/22/24  - To be admitted to MICU for continued BIPAP/respiratory support    **Pulmonary edema, in the setting of suspected CHF exacerbation  - CXR demonstrating b/l patchy opacities indicating pulm edema; b/l pleural effusions  - CT demonstrating ground glass opacities b/l  - S/p lasix 40 IV  - Give another dose of lasix 40 IV today and reassess  - Given b/l patchy opacities, will continue azithromycin, vancomycin (by level given MARIAN), cefepime    **PNA, low suspicion  - COVID negative x 2  - s/p zosyn x 1  - Start ceftriaxone and azithro x 5 days, given likely community-acquired  - F/u legionella  - F/u RVP    #Cardiovascular  **CHF exacerbation, suspected  - Admission EKG demonstrating NSR with LAD/nonspecific T wave abnormalities  - BMP 59107  - S/p Lasix 40 IV  - Give another dose of lasix 40 IV today and reassess    #GI  - No acute issues  - Pantoprazole prophylaxis - 40 IV daily    #/Renal  **MARIAN  - On admission, Cr 1.32, baseline unknown (patient has no other records aside from this admission)  - Trend BMP    #Endocrine  - No acute issues  - f/u thyroid studies    #ID  **PNA  - COVID negative x 2  - s/p zosyn x 1  - Start ceftriaxone and azithro x 5 days, given likely community-acquired  - F/u legionella  - F/u RVP    - UA unremarkable  - F/u Bcx, Ucx    #Heme/Onc  - Hgb 10.5 (baseline unknown)  - Trend CBC    #DVT Prophylaxis  - Heparin SubQ 2/2 MARIAN    #GOC  - Will attempt to contact son to determine code status 80F PMHx COPD (unknown level O2 at home), DM2, CHF, schizoaffective disorder brought in by relative due to altered mental status.     #Neuro  **AMS  - Intermittently agitated, reportedly A&Ox1 at baseline, pulling at BIPAP mask  - Precedex for agitation  - Haldol 1-2g IV PRN    **Schizoaffective disorder  - Home meds include risperdal, seroquel; med rec with son/family member    #Respiratory  **Hypercarbic respiratory failure, in the setting of possible COPD exacerbation, CHF exacerbation  - On admission, O2 sat 84% on RA, improving with BIPAP  - Initial VBG 7.17/80/32/28, improved to 7.2/64/22/24  - To be admitted to MICU for continued BIPAP/respiratory support    **Pulmonary edema, in the setting of suspected CHF exacerbation  - CXR demonstrating b/l patchy opacities indicating pulm edema; b/l pleural effusions  - CT demonstrating ground glass opacities b/l  - S/p lasix 40 IV  - Give another dose of lasix 40 IV today and reassess  - Given b/l patchy opacities, will continue azithromycin, vancomycin (by level given MARIAN), cefepime    **PNA, low suspicion  - COVID negative x 2  - s/p zosyn x 1  - Start ceftriaxone and azithro x 5 days, given likely community-acquired  - F/u legionella  - F/u RVP    #Cardiovascular  **CHF exacerbation, suspected  - Admission EKG demonstrating NSR with LAD/nonspecific T wave abnormalities  - BMP [id number]  - S/p Lasix 40 IV  - Give another dose of lasix 40 IV today and reassess    #GI  - No acute issues  - Pantoprazole prophylaxis - 40 IV daily    #/Renal  **MARIAN  - On admission, Cr 1.32, baseline unknown (patient has no other records aside from this admission)  - Trend BMP    #Endocrine  - No acute issues  - f/u thyroid studies    #ID  **PNA  - COVID negative x 2  - s/p zosyn x 1  - Start ceftriaxone and azithro x 5 days, given likely community-acquired  - F/u legionella  - F/u RVP    - UA unremarkable  - F/u Bcx, Ucx    #Heme/Onc  - Hgb 10.1 (baseline unknown)  - Trend CBC    #DVT Prophylaxis  - Heparin SubQ 2/2 MARIAN    #GOC  - Son wants DNR/DNI, will call him later to confirm 80F PMHx COPD (unknown level O2 at home), DM2, CHF, schizoaffective disorder brought in by relative due to altered mental status.     #Neuro  **AMS  - Intermittently agitated, reportedly A&Ox1 at baseline, pulling at BIPAP mask  - Precedex for agitation  - Haldol 1-2g IV PRN    **Schizoaffective disorder  - Hold home meds include risperdal, seroquel  - Will confirm med rec with son/family member    #Respiratory  **Hypercarbic respiratory failure, in the setting of possible COPD exacerbation, CHF exacerbation  - On admission, O2 sat 84% on RA, improving with BIPAP  - Initial VBG 7.17/80/32/28, improved to 7.2/64/22/24  - To be admitted to MICU for continued BIPAP/respiratory support    **Pulmonary edema, in the setting of suspected CHF exacerbation  - CXR demonstrating b/l patchy opacities indicating pulm edema; b/l pleural effusions  - CT demonstrating ground glass opacities b/l  - S/p lasix 40 IV  - Received lasix 40 IV today. Output: 1 L. Will observe ins & outs    **PNA, low suspicion  - COVID negative x 2  - s/p zosyn x 1  - Start ceftriaxone and azithro x 5 days, if community-acquired.   - F/u legionella, once negative can stop ceftriaxone  - F/u RVP    #Cardiovascular  **CHF exacerbation, suspected  - Admission EKG demonstrating NSR with LAD/nonspecific T wave abnormalities  - BMP [id number]  - Received lasix 40 IV today. Output: 1 L. Will continue to monitor I&Os.    #GI  - No acute issues  - Pantoprazole prophylaxis - 40 IV daily    #/Renal  **MARIAN  - On admission, Cr 1.32, baseline unknown (patient has no other records aside from this admission)  - Current Cr 1.27  - Trend BMP    #Endocrine  - No acute issues  - T3 51, awaiting remaining thyroid panel results    #ID  **PNA  - COVID negative x 2  - s/p zosyn x 1  - Start ceftriaxone and azithro x 5 days, given likely community-acquired  - F/u legionella  - F/u RVP  - UA unremarkable  - F/u Bcx, Ucx    #Heme/Onc  - Hgb 10.1 (baseline unknown)  - Trend CBC    #DVT Prophylaxis  - Heparin SubQ 2/2 MARIAN    #GOC  - Son wants DNR/DNI, will call him again later to confirm

## 2020-07-15 NOTE — ED ADULT NURSE REASSESSMENT NOTE - NS ED NURSE REASSESS COMMENT FT1
Pt taken to CT scan with RN and respiratory therapist on BIPAP. Pt tolerated well. Pt cleaned, new linens applied. Pt repositioned for comfort. Warm blanket applied. Red socks on. Safety and comfort maintained.

## 2020-07-15 NOTE — CONSULT NOTE ADULT - ASSESSMENT
Margie Alanis  80F PMHx COPD, DM, schizoaffective, CHF, brought to ED by son for lethargy, AMS, found to be hypercarbic and septic. Son denies trauma. CT C spine shows C1 L anterior arch Fx and L posterior arch Fx. Exam: In collar, EO nox, not FC, GROVE spontaneously and purposefully.  - No acute neurosurgical intervention  - Collar all times  - MRI C-spine when medically able

## 2020-07-15 NOTE — ED ADULT NURSE REASSESSMENT NOTE - NS ED NURSE REASSESS COMMENT FT1
MICU at bedside to assess pt. noted dentures in mouth and successfully removed dentures for better bipap seal. MICU attending stated they will accept patient. pt re-swabbed for COVID. now tolerating bipap well.

## 2020-07-15 NOTE — H&P ADULT - ATTENDING COMMENTS
80 F with history of COPD, DM2, CHF, schizoaffective disorder presents with AMS in setting of acute (possibly on chronic) hypoxemic / hypercapneic respiratory, heart failure exacerbation +/- pneumonia.  Limited baseline history and HPI unclear etiology of cervical spine fracture.   - c-coller per neurosurgery, follow up re any need for intervention  - Ceftriaxone azithro, sputum cx, blood and urine cx, ua, legionella.  - duonebs  and nasal Bipap (unable tolerate full mask 2/2 C-collar) for hypoxic and hypercapnic respiratory failure  - clarify history and goals with family when able ( multiple calls to contact, with no call back)    total critical care time spent 40 min

## 2020-07-15 NOTE — H&P ADULT - HISTORY OF PRESENT ILLNESS
80F PMHx COPD (unknown level O2 at home), DM2, CHF, schizoaffective disorder brought in by relative due to altered mental status. History limited as communication with patient limited due to mental status and no relative present to provide details of HPI. Per ED staff, who was present when son initially brought her to hospital earlier in evening, altered mentation began on 11AM on day of presentation; she was noted to be more lethargic with decreased response to verbal stimuli and minimally ambulatory (though at baseline, patient dependent on others for activities of daily living). ROS reportedly negative for fevers, chills, dysuria, N/V.    In the ED:  VS: BP /54-69, HR 65-89, RR 17-25, O2 Sat 84% on RA --> 100% 2L NC --> BiPAP, Tmax 98.2.  Labs: CBC - WBC 10.64, Hgb 10.5, Plts 292. CMP - BUN/Cr 35/1.32, Phos 5.3, Mg 1.5, CMP otherwise WNL. BNP 24090. UA - protein 30, hyaline casts 8, otherwise unremarkable. Tox screen negative. COVID negative.  Imaging:   CXR - Bilateral patchy opacities may represent multifocal pneumonia or pulmonary edema. Bilateral pleural effusions.   CTH - No acute intracranial hemorrhage, large cortical infarct or mass effect. Somewhat eccentric lucency in the right cerebellum, which be due to artifact although infarct cannot be excluded. If clinically indicated, short-term follow-up or MRI may be obtained for further evaluation. Partially visualized, age-indeterminate displaced fracture of the left C1 anterior and posterior arch. Asymmetry of the lateral atlantodental space. Recommend comparison to prior outside study.  CT cervical spine - Age-indeterminate, displaced left C1 anterior and posterior arch fracture as described, which may be acute given mild prevertebral soft tissue stranding/edema and no significant sclerosis at the fracture margin. Question mildly increased attenuation surrounding the thecal sac in the spinal canal. Extra-axial/epidural hematoma cannot be excluded. MRI is recommended for further evaluation of ligamentous injury and extra-axial hematoma. Nonspecific bilateral mastoid opacification. Recommend clinical correlation to assess acute mastoiditis.  CT ABD/pelvis - Small bilateral pleural effusion with atelectasis. Cardiomegaly. Nonspecific interlobular septal thickening and groundglass/patchy opacities in both lungs, which can be seen in pulmonaryedema. Recommend clinical correlation to assess underlying pneumonia. No bowel obstruction. Abdominal wall hernias as described. Nonspecific mild bilateral perinephric stranding without hydroureteronephrosis. Diffuse prominent bladder wall with nonspecific perivesical stranding. Recommend clinical correlation to assess urinary tract infection. Nonspecific stranding in the right lateral abdominal wall soft tissue, which may represent edema and/or cellulitis. Recommend direct visualization.    Patient given: Magnesium sulfate 2g x 1, zosyn x 1, Lasix 40 IV x 1; BIPAP. 80F PMHx COPD (unknown level O2 at home), DM2, CHF, schizoaffective disorder brought in by relative due to altered mental status. History limited as communication with patient limited due to mental status and no relative present to provide details of HPI. Per ED staff, who was present when son initially brought her to hospital earlier in evening, altered mentation began on 11AM on day of presentation; she was noted to be more lethargic with decreased response to verbal stimuli and minimally ambulatory (though at baseline, patient dependent on others for activities of daily living). ROS reportedly negative for fevers, chills, dysuria, N/V.    In the ED:  VS: BP /54-69, HR 65-89, RR 17-25, O2 Sat 84% on RA --> 100% 2L NC --> BiPAP, Tmax 98.2.  Labs: CBC - WBC 10.64, Hgb 10.5, Plts 292. CMP - BUN/Cr 35/1.32, Phos 5.3, Mg 1.5, CMP otherwise WNL. BNP 87206. UA - protein 30, hyaline casts 8, otherwise unremarkable. Tox screen negative. COVID negative.  Imaging:   CXR - Bilateral patchy opacities may represent multifocal pneumonia or pulmonary edema. Bilateral pleural effusions.   CTH - No acute intracranial hemorrhage, large cortical infarct or mass effect. Somewhat eccentric lucency in the right cerebellum, which be due to artifact although infarct cannot be excluded. If clinically indicated, short-term follow-up or MRI may be obtained for further evaluation. Partially visualized, age-indeterminate displaced fracture of the left C1 anterior and posterior arch. Asymmetry of the lateral atlantodental space. Recommend comparison to prior outside study.  CT cervical spine - Age-indeterminate, displaced left C1 anterior and posterior arch fracture as described, which may be acute given mild prevertebral soft tissue stranding/edema and no significant sclerosis at the fracture margin. Question mildly increased attenuation surrounding the thecal sac in the spinal canal. Extra-axial/epidural hematoma cannot be excluded. MRI is recommended for further evaluation of ligamentous injury and extra-axial hematoma. Nonspecific bilateral mastoid opacification. Recommend clinical correlation to assess acute mastoiditis.  CT Chest + ABD/pelvis - Small bilateral pleural effusion with atelectasis. Cardiomegaly. Nonspecific interlobular septal thickening and ground glass/patchy opacities in both lungs, which can be seen in pulmonary edema. Recommend clinical correlation to assess underlying pneumonia. No bowel obstruction. Abdominal wall hernias as described. Nonspecific mild bilateral perinephric stranding without hydroureteronephrosis. Diffuse prominent bladder wall with nonspecific perivesical stranding. Recommend clinical correlation to assess urinary tract infection. Nonspecific stranding in the right lateral abdominal wall soft tissue, which may represent edema and/or cellulitis. Recommend direct visualization.    Patient given: Magnesium sulfate 2g x 1, zosyn x 1, Lasix 40 IV x 1; BIPAP.

## 2020-07-15 NOTE — PROGRESS NOTE ADULT - ATTENDING COMMENTS
80 F with history of COPD, DM2, CHF, schizoaffective disorder presents with AMS in setting of acute (possibly on chronic) hypoxemic / hypercapneic respiratory, heart failure exacerbation. Possible underlying pneumonia is present.     1. Acute hypoxemic and hypercapneic respiratory failure - continue BiPAP support - using nasal BiPAP despite poor seal as it is not feasible to use full mask while C-collar is in place  2. Acute on chronic heart failure exacerbation. Grossly normal systolic function on bedside echo. Order official TTE. Alejandrae.  3. Community acquired pneumonia - ceftriaxone and azithromycin. Follow up cultures  4. C1 L anterior arch and left posterior arch fracture - neurosurgery recommendations appreciated - continue C-collar  5. Goals of care - establish with patient's family - to be discussed today

## 2020-07-15 NOTE — ED ADULT NURSE REASSESSMENT NOTE - NS ED NURSE REASSESS COMMENT FT1
Pt undressing herself, pulling off wires and attempting to pull IV out multiple times. MD Mendoza made aware and states 1:1 to be ordered. Pt undressing herself, pulling off pulse ox/BP/cardiac monitor, and attempting to pull IV out multiple times. MD Mendoza made aware and states 1:1 to be ordered.

## 2020-07-16 LAB
ALBUMIN SERPL ELPH-MCNC: 2.8 G/DL — LOW (ref 3.3–5)
ALP SERPL-CCNC: 83 U/L — SIGNIFICANT CHANGE UP (ref 40–120)
ALT FLD-CCNC: 10 U/L — SIGNIFICANT CHANGE UP (ref 10–45)
ANION GAP SERPL CALC-SCNC: 13 MMOL/L — SIGNIFICANT CHANGE UP (ref 5–17)
APTT BLD: 31 SEC — SIGNIFICANT CHANGE UP (ref 27.5–35.5)
AST SERPL-CCNC: 13 U/L — SIGNIFICANT CHANGE UP (ref 10–40)
BASOPHILS # BLD AUTO: 0.06 K/UL — SIGNIFICANT CHANGE UP (ref 0–0.2)
BASOPHILS NFR BLD AUTO: 0.6 % — SIGNIFICANT CHANGE UP (ref 0–2)
BILIRUB SERPL-MCNC: 0.2 MG/DL — SIGNIFICANT CHANGE UP (ref 0.2–1.2)
BUN SERPL-MCNC: 33 MG/DL — HIGH (ref 7–23)
CALCIUM SERPL-MCNC: 8.4 MG/DL — SIGNIFICANT CHANGE UP (ref 8.4–10.5)
CHLORIDE SERPL-SCNC: 105 MMOL/L — SIGNIFICANT CHANGE UP (ref 96–108)
CO2 SERPL-SCNC: 26 MMOL/L — SIGNIFICANT CHANGE UP (ref 22–31)
CREAT SERPL-MCNC: 1.16 MG/DL — SIGNIFICANT CHANGE UP (ref 0.5–1.3)
CULTURE RESULTS: NO GROWTH — SIGNIFICANT CHANGE UP
CULTURE RESULTS: SIGNIFICANT CHANGE UP
EOSINOPHIL # BLD AUTO: 0.29 K/UL — SIGNIFICANT CHANGE UP (ref 0–0.5)
EOSINOPHIL NFR BLD AUTO: 2.8 % — SIGNIFICANT CHANGE UP (ref 0–6)
GAS PNL BLDA: SIGNIFICANT CHANGE UP
GLUCOSE BLDC GLUCOMTR-MCNC: 117 MG/DL — HIGH (ref 70–99)
GLUCOSE BLDC GLUCOMTR-MCNC: 137 MG/DL — HIGH (ref 70–99)
GLUCOSE BLDC GLUCOMTR-MCNC: 94 MG/DL — SIGNIFICANT CHANGE UP (ref 70–99)
GLUCOSE SERPL-MCNC: 124 MG/DL — HIGH (ref 70–99)
HCT VFR BLD CALC: 33 % — LOW (ref 34.5–45)
HGB BLD-MCNC: 9.6 G/DL — LOW (ref 11.5–15.5)
IMM GRANULOCYTES NFR BLD AUTO: 0.5 % — SIGNIFICANT CHANGE UP (ref 0–1.5)
INR BLD: 1.05 RATIO — SIGNIFICANT CHANGE UP (ref 0.88–1.16)
LEGIONELLA AG UR QL: NEGATIVE — SIGNIFICANT CHANGE UP
LYMPHOCYTES # BLD AUTO: 0.75 K/UL — LOW (ref 1–3.3)
LYMPHOCYTES # BLD AUTO: 7.3 % — LOW (ref 13–44)
MAGNESIUM SERPL-MCNC: 1.6 MG/DL — SIGNIFICANT CHANGE UP (ref 1.6–2.6)
MCHC RBC-ENTMCNC: 24.6 PG — LOW (ref 27–34)
MCHC RBC-ENTMCNC: 29.1 GM/DL — LOW (ref 32–36)
MCV RBC AUTO: 84.4 FL — SIGNIFICANT CHANGE UP (ref 80–100)
MONOCYTES # BLD AUTO: 0.61 K/UL — SIGNIFICANT CHANGE UP (ref 0–0.9)
MONOCYTES NFR BLD AUTO: 5.9 % — SIGNIFICANT CHANGE UP (ref 2–14)
NEUTROPHILS # BLD AUTO: 8.53 K/UL — HIGH (ref 1.8–7.4)
NEUTROPHILS NFR BLD AUTO: 82.9 % — HIGH (ref 43–77)
NRBC # BLD: 0 /100 WBCS — SIGNIFICANT CHANGE UP (ref 0–0)
ORGANISM # SPEC MICROSCOPIC CNT: SIGNIFICANT CHANGE UP
ORGANISM # SPEC MICROSCOPIC CNT: SIGNIFICANT CHANGE UP
PHOSPHATE SERPL-MCNC: 4 MG/DL — SIGNIFICANT CHANGE UP (ref 2.5–4.5)
PLATELET # BLD AUTO: 245 K/UL — SIGNIFICANT CHANGE UP (ref 150–400)
POTASSIUM SERPL-MCNC: 3.7 MMOL/L — SIGNIFICANT CHANGE UP (ref 3.5–5.3)
POTASSIUM SERPL-SCNC: 3.7 MMOL/L — SIGNIFICANT CHANGE UP (ref 3.5–5.3)
PROT SERPL-MCNC: 5.6 G/DL — LOW (ref 6–8.3)
PROTHROM AB SERPL-ACNC: 12.5 SEC — SIGNIFICANT CHANGE UP (ref 10.6–13.6)
RBC # BLD: 3.91 M/UL — SIGNIFICANT CHANGE UP (ref 3.8–5.2)
RBC # FLD: 16.6 % — HIGH (ref 10.3–14.5)
SODIUM SERPL-SCNC: 144 MMOL/L — SIGNIFICANT CHANGE UP (ref 135–145)
SPECIMEN SOURCE: SIGNIFICANT CHANGE UP
SPECIMEN SOURCE: SIGNIFICANT CHANGE UP
WBC # BLD: 10.29 K/UL — SIGNIFICANT CHANGE UP (ref 3.8–10.5)
WBC # FLD AUTO: 10.29 K/UL — SIGNIFICANT CHANGE UP (ref 3.8–10.5)

## 2020-07-16 PROCEDURE — 71045 X-RAY EXAM CHEST 1 VIEW: CPT | Mod: 26

## 2020-07-16 PROCEDURE — 99291 CRITICAL CARE FIRST HOUR: CPT

## 2020-07-16 PROCEDURE — 93306 TTE W/DOPPLER COMPLETE: CPT | Mod: 26

## 2020-07-16 RX ORDER — RIVAROXABAN 15 MG-20MG
20 KIT ORAL DAILY
Refills: 0 | Status: DISCONTINUED | OUTPATIENT
Start: 2020-07-16 | End: 2020-07-20

## 2020-07-16 RX ORDER — RIVAROXABAN 15 MG-20MG
20 KIT ORAL DAILY
Refills: 0 | Status: DISCONTINUED | OUTPATIENT
Start: 2020-07-16 | End: 2020-07-16

## 2020-07-16 RX ORDER — OLANZAPINE 15 MG/1
5 TABLET, FILM COATED ORAL ONCE
Refills: 0 | Status: COMPLETED | OUTPATIENT
Start: 2020-07-16 | End: 2020-07-16

## 2020-07-16 RX ORDER — SODIUM CHLORIDE 9 MG/ML
1000 INJECTION, SOLUTION INTRAVENOUS
Refills: 0 | Status: DISCONTINUED | OUTPATIENT
Start: 2020-07-16 | End: 2020-07-16

## 2020-07-16 RX ORDER — QUETIAPINE FUMARATE 200 MG/1
25 TABLET, FILM COATED ORAL
Refills: 0 | Status: DISCONTINUED | OUTPATIENT
Start: 2020-07-16 | End: 2020-07-20

## 2020-07-16 RX ORDER — FUROSEMIDE 40 MG
40 TABLET ORAL ONCE
Refills: 0 | Status: COMPLETED | OUTPATIENT
Start: 2020-07-16 | End: 2020-07-16

## 2020-07-16 RX ADMIN — RIVAROXABAN 20 MILLIGRAM(S): KIT at 19:00

## 2020-07-16 RX ADMIN — Medication 7.69 MICROGRAM(S)/KG/MIN: at 21:11

## 2020-07-16 RX ADMIN — Medication 40 MILLIGRAM(S): at 11:16

## 2020-07-16 RX ADMIN — HALOPERIDOL DECANOATE 1 MILLIGRAM(S): 100 INJECTION INTRAMUSCULAR at 13:24

## 2020-07-16 RX ADMIN — CHLORHEXIDINE GLUCONATE 1 APPLICATION(S): 213 SOLUTION TOPICAL at 06:15

## 2020-07-16 RX ADMIN — QUETIAPINE FUMARATE 25 MILLIGRAM(S): 200 TABLET, FILM COATED ORAL at 19:00

## 2020-07-16 RX ADMIN — OLANZAPINE 5 MILLIGRAM(S): 15 TABLET, FILM COATED ORAL at 13:50

## 2020-07-16 RX ADMIN — DEXMEDETOMIDINE HYDROCHLORIDE IN 0.9% SODIUM CHLORIDE 4.5 MICROGRAM(S)/KG/HR: 4 INJECTION INTRAVENOUS at 21:10

## 2020-07-16 RX ADMIN — PANTOPRAZOLE SODIUM 40 MILLIGRAM(S): 20 TABLET, DELAYED RELEASE ORAL at 11:16

## 2020-07-16 RX ADMIN — CEFTRIAXONE 100 MILLIGRAM(S): 500 INJECTION, POWDER, FOR SOLUTION INTRAMUSCULAR; INTRAVENOUS at 08:50

## 2020-07-16 RX ADMIN — SODIUM CHLORIDE 50 MILLILITER(S): 9 INJECTION, SOLUTION INTRAVENOUS at 05:32

## 2020-07-16 NOTE — CHART NOTE - NSCHARTNOTEFT_GEN_A_CORE
Spoke with pt's PCP, Dr. Thang Colvin (work [phone], cell [phone]) regarding pt's history of taking Xarelto. She has hx of multiple episodes of PE, believes her last one was approximately 2 years ago.

## 2020-07-16 NOTE — PROGRESS NOTE ADULT - ATTENDING COMMENTS
80 F with history of COPD, DM2, CHF, schizoaffective disorder presents with AMS in setting of acute (possibly on chronic) hypoxemic / hypercapneic respiratory, heart failure exacerbation. Possible underlying pneumonia.    1. Acute hypoxemic and hypercapneic respiratory failure - continue BiPAP support - ABG improving  2. Acute on chronic heart failure exacerbation. EF 25% on official TTE. No prior echo on record for comparison.  3. Community acquired pneumonia - ceftriaxone and azithromycin. Follow up cultures.  4. C1 L anterior arch and left posterior arch fracture - neurosurgery recommendations appreciated - continue C-collar. MRI when stable.  5. Goals of care - DNR/DNI    Prognosis guarded

## 2020-07-16 NOTE — PROGRESS NOTE ADULT - SUBJECTIVE AND OBJECTIVE BOX
Nan Lockwood MD  PGY 1 Department of Internal Medicine  Pager: [phone]      Patient is a 80y old  Female who presents with a chief complaint of AMS (2020 05:31)      SUBJECTIVE / OVERNIGHT EVENTS: Pt seen and examined. No acute overnight events.   Denies fevers, chills, CP/palpitations, SOB/cough, abdominal pain, N/V, constipation, or diarrhea.        MEDICATIONS  (STANDING):  azithromycin  IVPB 500 milliGRAM(s) IV Intermittent every 24 hours  azithromycin  IVPB      cefTRIAXone   IVPB 1000 milliGRAM(s) IV Intermittent every 24 hours  chlorhexidine 4% Liquid 1 Application(s) Topical <User Schedule>  dexMEDEtomidine Infusion 0.2 MICROgram(s)/kG/Hr (4.5 mL/Hr) IV Continuous <Continuous>  dextrose 5% + sodium chloride 0.9%. 1000 milliLiter(s) (50 mL/Hr) IV Continuous <Continuous>  dextrose 5%. 1000 milliLiter(s) (50 mL/Hr) IV Continuous <Continuous>  dextrose 50% Injectable 12.5 Gram(s) IV Push once  dextrose 50% Injectable 25 Gram(s) IV Push once  dextrose 50% Injectable 25 Gram(s) IV Push once  heparin   Injectable 5000 Unit(s) SubCutaneous every 8 hours  insulin lispro (HumaLOG) corrective regimen sliding scale   SubCutaneous every 6 hours  norepinephrine Infusion 0.05 MICROgram(s)/kG/Min (7.69 mL/Hr) IV Continuous <Continuous>  pantoprazole  Injectable 40 milliGRAM(s) IV Push daily    MEDICATIONS  (PRN):  dextrose 40% Gel 15 Gram(s) Oral once PRN Blood Glucose LESS THAN 70 milliGRAM(s)/deciLiter  glucagon  Injectable 1 milliGRAM(s) IntraMuscular once PRN Glucose <70 milliGRAM(s)/deciLiter  haloperidol    Injectable 1 milliGRAM(s) IV Push every 4 hours PRN agitation      I&O's Summary    15 Jul 2020 07:01  -  2020 07:00  --------------------------------------------------------  IN: 644.7 mL / OUT: 2350 mL / NET: -1705.3 mL        Vital Signs Last 24 Hrs  T(C): 37 (2020 05:00), Max: 37 (2020 04:00)  T(F): 98.6 (2020 05:00), Max: 98.6 (2020 04:00)  HR: 59 (2020 06:45) (46 - 63)  BP: 119/53 (2020 06:45) (72/45 - 151/69)  BP(mean): 77 (2020 06:45) (54 - 99)  RR: 21 (2020 06:45) (8 - 31)  SpO2: 100% (2020 06:45) (93% - 100%)    CAPILLARY BLOOD GLUCOSE      POCT Blood Glucose.: 94 mg/dL (2020 05:26)      PHYSICAL EXAM:  GENERAL: NAD,   HEAD:  Atraumatic, Normocephalic  EYES: EOMI, PERRL, conjunctiva and sclera clear  NECK: No JVD  CHEST/LUNG: Clear to auscultation bilaterally; No wheeze  HEART: Regular rate and rhythm; No murmurs, rubs, or gallops  ABDOMEN: Soft, Nontender, Nondistended; Bowel sounds present  EXTREMITIES:  2+ Peripheral Pulses, No clubbing, cyanosis, or edema  PSYCH: AAOx3  NEUROLOGY: non-focal  SKIN: No rashes or lesions       LABS:                        9.6    10.29 )-----------( 245      ( 2020 00:37 )             33.0     Auto Eosinophil # 0.29  / Auto Eosinophil % 2.8   / Auto Neutrophil # 8.53  / Auto Neutrophil % 82.9  / BANDS % x                            9.6    9.90  )-----------( 243      ( 15 Jul 2020 16:52 )             33.3     Auto Eosinophil # 0.32  / Auto Eosinophil % 3.2   / Auto Neutrophil # 7.68  / Auto Neutrophil % 77.6  / BANDS % x                            10.1   10.12 )-----------( 261      ( 15 Jul 2020 07:04 )             35.3     Auto Eosinophil # 0.14  / Auto Eosinophil % 1.4   / Auto Neutrophil # 8.03  / Auto Neutrophil % 79.3  / BANDS % x        07-16    144  |  105  |  33<H>  ----------------------------<  124<H>  3.7   |  26  |  1.16  07-15    143  |  104  |  34<H>  ----------------------------<  103<H>  3.8   |  24  |  1.19  07-15    140  |  104  |  34<H>  ----------------------------<  112<H>  4.0   |  22  |  1.27    Ca    8.4      2020 00:37  Mg     1.6     07-16  Phos  4.0     07-16  TPro  5.6<L>  /  Alb  2.8<L>  /  TBili  0.2  /  DBili  x   /  AST  13  /  ALT  10  /  AlkPhos  83  07-16  TPro  5.9<L>  /  Alb  2.7<L>  /  TBili  0.1<L>  /  DBili  x   /  AST  18  /  ALT  13  /  AlkPhos  80  07-15  TPro  6.4  /  Alb  3.0<L>  /  TBili  0.2  /  DBili  x   /  AST  12  /  ALT  13  /  AlkPhos  86  07-15    PT/INR - ( 2020 00:37 )   PT: 12.5 sec;   INR: 1.05 ratio         PTT - ( 2020 00:37 )  PTT:31.0 sec  CARDIAC MARKERS ( 15 Jul 2020 07:04 )  x     / x     / 27 U/L / x     / 2.3 ng/mL      Urinalysis Basic - ( 15 Jul 2020 00:34 )    Color: Yellow / Appearance: Clear / S.022 / pH: x  Gluc: x / Ketone: Negative  / Bili: Negative / Urobili: Negative   Blood: x / Protein: 30 mg/dL / Nitrite: Negative   Leuk Esterase: Negative / RBC: 4 /hpf / WBC 2 /HPF   Sq Epi: x / Non Sq Epi: 10 / Bacteria: Negative Nan Lockwood MD  PGY 1 Department of Internal Medicine  Pager: [phone]      Patient is a 80y old  Female who presents with a chief complaint of AMS (2020 05:31)      SUBJECTIVE / OVERNIGHT EVENTS: Pt seen and examined. FiO2 was increased from 30% to 40% and pO2 improved to 100, pCO2 decreased to 56%, pH: 7.33, HCO3 to 29, and arterial oxygen sat.: 97.  Bcx: initial was gram + cocci in clusters, was given 1 dose of vancomycin,, came back as Staph. epidermiditis and 2nd: no growth, urine cx: no growth. Stopped vancomycin.  Denies fevers, chills, CP/palpitations, SOB/cough, abdominal pain, N/V, constipation, or diarrhea.        MEDICATIONS  (STANDING):  azithromycin  IVPB 500 milliGRAM(s) IV Intermittent every 24 hours  azithromycin  IVPB      cefTRIAXone   IVPB 1000 milliGRAM(s) IV Intermittent every 24 hours  chlorhexidine 4% Liquid 1 Application(s) Topical <User Schedule>  dexMEDEtomidine Infusion 0.2 MICROgram(s)/kG/Hr (4.5 mL/Hr) IV Continuous <Continuous>  dextrose 5% + sodium chloride 0.9%. 1000 milliLiter(s) (50 mL/Hr) IV Continuous <Continuous>  dextrose 5%. 1000 milliLiter(s) (50 mL/Hr) IV Continuous <Continuous>  dextrose 50% Injectable 12.5 Gram(s) IV Push once  dextrose 50% Injectable 25 Gram(s) IV Push once  dextrose 50% Injectable 25 Gram(s) IV Push once  heparin   Injectable 5000 Unit(s) SubCutaneous every 8 hours  insulin lispro (HumaLOG) corrective regimen sliding scale   SubCutaneous every 6 hours  norepinephrine Infusion 0.05 MICROgram(s)/kG/Min (7.69 mL/Hr) IV Continuous <Continuous>  pantoprazole  Injectable 40 milliGRAM(s) IV Push daily    MEDICATIONS  (PRN):  dextrose 40% Gel 15 Gram(s) Oral once PRN Blood Glucose LESS THAN 70 milliGRAM(s)/deciLiter  glucagon  Injectable 1 milliGRAM(s) IntraMuscular once PRN Glucose <70 milliGRAM(s)/deciLiter  haloperidol    Injectable 1 milliGRAM(s) IV Push every 4 hours PRN agitation      I&O's Summary    15 Jul 2020 07:01  -  2020 07:00  --------------------------------------------------------  IN: 644.7 mL / OUT: 2350 mL / NET: -1705.3 mL        Vital Signs Last 24 Hrs  T(C): 37 (2020 05:00), Max: 37 (2020 04:00)  T(F): 98.6 (2020 05:00), Max: 98.6 (2020 04:00)  HR: 59 (2020 06:45) (46 - 63)  BP: 119/53 (2020 06:45) (72/45 - 151/69)  BP(mean): 77 (2020 06:45) (54 - 99)  RR: 21 (2020 06:45) (8 - 31)  SpO2: 100% (2020 06:45) (93% - 100%)    CAPILLARY BLOOD GLUCOSE      POCT Blood Glucose.: 94 mg/dL (2020 05:26)      PHYSICAL EXAM:  GENERAL: NAD,   HEAD:  Atraumatic, Normocephalic  EYES: EOMI, PERRL, conjunctiva and sclera clear  NECK: No JVD  CHEST/LUNG: Clear to auscultation bilaterally; No wheeze  HEART: Regular rate and rhythm; No murmurs, rubs, or gallops  ABDOMEN: Soft, Nontender, Nondistended; Bowel sounds present  EXTREMITIES:  2+ Peripheral Pulses, No clubbing, cyanosis, or edema  PSYCH: AAOx3  NEUROLOGY: non-focal  SKIN: No rashes or lesions       LABS:                        9.6    10.29 )-----------( 245      ( 2020 00:37 )             33.0     Auto Eosinophil # 0.29  / Auto Eosinophil % 2.8   / Auto Neutrophil # 8.53  / Auto Neutrophil % 82.9  / BANDS % x                            9.6    9.90  )-----------( 243      ( 15 Jul 2020 16:52 )             33.3     Auto Eosinophil # 0.32  / Auto Eosinophil % 3.2   / Auto Neutrophil # 7.68  / Auto Neutrophil % 77.6  / BANDS % x                            10.1   10.12 )-----------( 261      ( 15 Jul 2020 07:04 )             35.3     Auto Eosinophil # 0.14  / Auto Eosinophil % 1.4   / Auto Neutrophil # 8.03  / Auto Neutrophil % 79.3  / BANDS % x        07-16    144  |  105  |  33<H>  ----------------------------<  124<H>  3.7   |  26  |  1.16  07-15    143  |  104  |  34<H>  ----------------------------<  103<H>  3.8   |  24  |  1.19  07-15    140  |  104  |  34<H>  ----------------------------<  112<H>  4.0   |  22  |  1.27    Ca    8.4      2020 00:37  Mg     1.6     07-16  Phos  4.0     07-16  TPro  5.6<L>  /  Alb  2.8<L>  /  TBili  0.2  /  DBili  x   /  AST  13  /  ALT  10  /  AlkPhos  83  07-16  TPro  5.9<L>  /  Alb  2.7<L>  /  TBili  0.1<L>  /  DBili  x   /  AST  18  /  ALT  13  /  AlkPhos  80  07-15  TPro  6.4  /  Alb  3.0<L>  /  TBili  0.2  /  DBili  x   /  AST  12  /  ALT  13  /  AlkPhos  86  07-15    PT/INR - ( 2020 00:37 )   PT: 12.5 sec;   INR: 1.05 ratio         PTT - ( 2020 00:37 )  PTT:31.0 sec  CARDIAC MARKERS ( 15 Jul 2020 07:04 )  x     / x     / 27 U/L / x     / 2.3 ng/mL      Urinalysis Basic - ( 15 Jul 2020 00:34 )    Color: Yellow / Appearance: Clear / S.022 / pH: x  Gluc: x / Ketone: Negative  / Bili: Negative / Urobili: Negative   Blood: x / Protein: 30 mg/dL / Nitrite: Negative   Leuk Esterase: Negative / RBC: 4 /hpf / WBC 2 /HPF   Sq Epi: x / Non Sq Epi: 10 / Bacteria: Negative Nan Lockwood MD  PGY 1 Department of Internal Medicine  Pager: [phone]      Patient is a 80y old female who presents with a chief complaint of AMS (2020 05:31)      SUBJECTIVE / OVERNIGHT EVENTS: Pt seen and examined. FiO2 was increased from 30% to 40% and pO2 improved to 100, pCO2 decreased to 56%, pH: 7.33, HCO3 to 29, and arterial oxygen sat.: 97.  Bcx: initial was gram + cocci in clusters, was given 1 dose of vancomycin, came back as Staph. epidermiditis and 2nd: no growth, urine cx: no growth. Stopped vancomycin.  Denies fevers, chills, CP/palpitations, SOB/cough, abdominal pain, N/V, constipation, or diarrhea.        MEDICATIONS  (STANDING):  azithromycin  IVPB 500 milliGRAM(s) IV Intermittent every 24 hours  azithromycin  IVPB      cefTRIAXone   IVPB 1000 milliGRAM(s) IV Intermittent every 24 hours  chlorhexidine 4% Liquid 1 Application(s) Topical <User Schedule>  dexMEDEtomidine Infusion 0.2 MICROgram(s)/kG/Hr (4.5 mL/Hr) IV Continuous <Continuous>  dextrose 5% + sodium chloride 0.9%. 1000 milliLiter(s) (50 mL/Hr) IV Continuous <Continuous>  dextrose 5%. 1000 milliLiter(s) (50 mL/Hr) IV Continuous <Continuous>  dextrose 50% Injectable 12.5 Gram(s) IV Push once  dextrose 50% Injectable 25 Gram(s) IV Push once  dextrose 50% Injectable 25 Gram(s) IV Push once  heparin   Injectable 5000 Unit(s) SubCutaneous every 8 hours  insulin lispro (HumaLOG) corrective regimen sliding scale   SubCutaneous every 6 hours  norepinephrine Infusion 0.05 MICROgram(s)/kG/Min (7.69 mL/Hr) IV Continuous <Continuous>  pantoprazole  Injectable 40 milliGRAM(s) IV Push daily    MEDICATIONS  (PRN):  dextrose 40% Gel 15 Gram(s) Oral once PRN Blood Glucose LESS THAN 70 milliGRAM(s)/deciLiter  glucagon  Injectable 1 milliGRAM(s) IntraMuscular once PRN Glucose <70 milliGRAM(s)/deciLiter  haloperidol    Injectable 1 milliGRAM(s) IV Push every 4 hours PRN agitation      I&O's Summary    15 Jul 2020 07:01  -  2020 07:00  --------------------------------------------------------  IN: 644.7 mL / OUT: 2350 mL / NET: -1705.3 mL        Vital Signs Last 24 Hrs  T(C): 37 (2020 05:00), Max: 37 (2020 04:00)  T(F): 98.6 (2020 05:00), Max: 98.6 (2020 04:00)  HR: 59 (2020 06:45) (46 - 63)  BP: 119/53 (2020 06:45) (72/45 - 151/69)  BP(mean): 77 (2020 06:45) (54 - 99)  RR: 21 (2020 06:45) (8 - 31)  SpO2: 100% (2020 06:45) (93% - 100%)    CAPILLARY BLOOD GLUCOSE      POCT Blood Glucose.: 94 mg/dL (2020 05:26)      PHYSICAL EXAM:  GENERAL: NAD,   HEAD:  Atraumatic, Normocephalic  EYES: EOMI, PERRL, conjunctiva and sclera clear  NECK: No JVD  CHEST/LUNG: Clear to auscultation bilaterally; No wheeze  HEART: Regular rate and rhythm; No murmurs, rubs, or gallops  ABDOMEN: Soft, Nontender, Nondistended; Bowel sounds present  EXTREMITIES:  2+ Peripheral Pulses, No clubbing, cyanosis, or edema  PSYCH: AAOx3  NEUROLOGY: non-focal  SKIN: No rashes or lesions       LABS:                        9.6    10.29 )-----------( 245      ( 2020 00:37 )             33.0     Auto Eosinophil # 0.29  / Auto Eosinophil % 2.8   / Auto Neutrophil # 8.53  / Auto Neutrophil % 82.9  / BANDS % x                            9.6    9.90  )-----------( 243      ( 15 Jul 2020 16:52 )             33.3     Auto Eosinophil # 0.32  / Auto Eosinophil % 3.2   / Auto Neutrophil # 7.68  / Auto Neutrophil % 77.6  / BANDS % x                            10.1   10.12 )-----------( 261      ( 15 Jul 2020 07:04 )             35.3     Auto Eosinophil # 0.14  / Auto Eosinophil % 1.4   / Auto Neutrophil # 8.03  / Auto Neutrophil % 79.3  / BANDS % x        07-16    144  |  105  |  33<H>  ----------------------------<  124<H>  3.7   |  26  |  1.16  07-15    143  |  104  |  34<H>  ----------------------------<  103<H>  3.8   |  24  |  1.19  07-15    140  |  104  |  34<H>  ----------------------------<  112<H>  4.0   |  22  |  1.27    Ca    8.4      2020 00:37  Mg     1.6     07-16  Phos  4.0     07-16  TPro  5.6<L>  /  Alb  2.8<L>  /  TBili  0.2  /  DBili  x   /  AST  13  /  ALT  10  /  AlkPhos  83  07-16  TPro  5.9<L>  /  Alb  2.7<L>  /  TBili  0.1<L>  /  DBili  x   /  AST  18  /  ALT  13  /  AlkPhos  80  07-15  TPro  6.4  /  Alb  3.0<L>  /  TBili  0.2  /  DBili  x   /  AST  12  /  ALT  13  /  AlkPhos  86  07-15    PT/INR - ( 2020 00:37 )   PT: 12.5 sec;   INR: 1.05 ratio         PTT - ( 2020 00:37 )  PTT:31.0 sec  CARDIAC MARKERS ( 15 Jul 2020 07:04 )  x     / x     / 27 U/L / x     / 2.3 ng/mL      Urinalysis Basic - ( 15 Jul 2020 00:34 )    Color: Yellow / Appearance: Clear / S.022 / pH: x  Gluc: x / Ketone: Negative  / Bili: Negative / Urobili: Negative   Blood: x / Protein: 30 mg/dL / Nitrite: Negative   Leuk Esterase: Negative / RBC: 4 /hpf / WBC 2 /HPF   Sq Epi: x / Non Sq Epi: 10 / Bacteria: Negative Nan Lockwood MD  PGY 1 Department of Internal Medicine  Pager: [phone]      Patient is a 80y old female who presents with a chief complaint of AMS (2020 05:31)      SUBJECTIVE / OVERNIGHT EVENTS: Pt seen and examined. FiO2 was increased from 30% to 40% and pO2 improved to 100, pCO2 decreased to 56%, pH: 7.33, HCO3 26, and arterial oxygen sat.: 97.  Bcx: initial was gram + cocci in clusters, was given 1 dose of vancomycin, came back as Staph. epidermiditis and 2nd: no growth, urine cx: no growth. Stopped vancomycin.  ROS limited as patient has AMS and is on nasal BiPAP.      MEDICATIONS  (STANDING):  azithromycin  IVPB 500 milliGRAM(s) IV Intermittent every 24 hours  azithromycin  IVPB      cefTRIAXone   IVPB 1000 milliGRAM(s) IV Intermittent every 24 hours  chlorhexidine 4% Liquid 1 Application(s) Topical <User Schedule>  dexMEDEtomidine Infusion 0.2 MICROgram(s)/kG/Hr (4.5 mL/Hr) IV Continuous <Continuous>  dextrose 5% + sodium chloride 0.9%. 1000 milliLiter(s) (50 mL/Hr) IV Continuous <Continuous>  dextrose 5%. 1000 milliLiter(s) (50 mL/Hr) IV Continuous <Continuous>  dextrose 50% Injectable 12.5 Gram(s) IV Push once  dextrose 50% Injectable 25 Gram(s) IV Push once  dextrose 50% Injectable 25 Gram(s) IV Push once  heparin   Injectable 5000 Unit(s) SubCutaneous every 8 hours  insulin lispro (HumaLOG) corrective regimen sliding scale   SubCutaneous every 6 hours  norepinephrine Infusion 0.05 MICROgram(s)/kG/Min (7.69 mL/Hr) IV Continuous <Continuous>  pantoprazole  Injectable 40 milliGRAM(s) IV Push daily    MEDICATIONS  (PRN):  dextrose 40% Gel 15 Gram(s) Oral once PRN Blood Glucose LESS THAN 70 milliGRAM(s)/deciLiter  glucagon  Injectable 1 milliGRAM(s) IntraMuscular once PRN Glucose <70 milliGRAM(s)/deciLiter  haloperidol    Injectable 1 milliGRAM(s) IV Push every 4 hours PRN agitation      I&O's Summary    15 Jul 2020 07:01  -  2020 07:00  --------------------------------------------------------  IN: 644.7 mL / OUT: 2350 mL / NET: -1705.3 mL        Vital Signs Last 24 Hrs  T(C): 37 (2020 05:00), Max: 37 (2020 04:00)  T(F): 98.6 (2020 05:00), Max: 98.6 (2020 04:00)  HR: 59 (2020 06:45) (46 - 63)  BP: 119/53 (2020 06:45) (72/45 - 151/69)  BP(mean): 77 (2020 06:45) (54 - 99)  RR: 21 (2020 06:45) (8 - 31)  SpO2: 100% (2020 06:45) (93% - 100%)    CAPILLARY BLOOD GLUCOSE      POCT Blood Glucose.: 94 mg/dL (2020 05:26)      PHYSICAL EXAM:  GENERAL: NAD,   HEAD:  Atraumatic, Normocephalic  EYES: EOMI, PERRL, conjunctiva and sclera clear  NECK: No JVD  CHEST/LUNG: Clear to auscultation bilaterally; No wheeze  HEART: Regular rate and rhythm; No murmurs, rubs, or gallops  ABDOMEN: Soft, Nontender, Nondistended; Bowel sounds present  EXTREMITIES:  2+ Peripheral Pulses, No clubbing, cyanosis, or edema  PSYCH: AAOx3  NEUROLOGY: non-focal  SKIN: No rashes or lesions       LABS:                        9.6    10.29 )-----------( 245      ( 2020 00:37 )             33.0     Auto Eosinophil # 0.29  / Auto Eosinophil % 2.8   / Auto Neutrophil # 8.53  / Auto Neutrophil % 82.9  / BANDS % x                            9.6    9.90  )-----------( 243      ( 15 Jul 2020 16:52 )             33.3     Auto Eosinophil # 0.32  / Auto Eosinophil % 3.2   / Auto Neutrophil # 7.68  / Auto Neutrophil % 77.6  / BANDS % x                            10.1   10.12 )-----------( 261      ( 15 Jul 2020 07:04 )             35.3     Auto Eosinophil # 0.14  / Auto Eosinophil % 1.4   / Auto Neutrophil # 8.03  / Auto Neutrophil % 79.3  / BANDS % x        07-16    144  |  105  |  33<H>  ----------------------------<  124<H>  3.7   |  26  |  1.16  07-15    143  |  104  |  34<H>  ----------------------------<  103<H>  3.8   |  24  |  1.19  07-15    140  |  104  |  34<H>  ----------------------------<  112<H>  4.0   |  22  |  1.27    Ca    8.4      2020 00:37  Mg     1.6     07-16  Phos  4.0     07-16  TPro  5.6<L>  /  Alb  2.8<L>  /  TBili  0.2  /  DBili  x   /  AST  13  /  ALT  10  /  AlkPhos  83  07-16  TPro  5.9<L>  /  Alb  2.7<L>  /  TBili  0.1<L>  /  DBili  x   /  AST  18  /  ALT  13  /  AlkPhos  80  07-15  TPro  6.4  /  Alb  3.0<L>  /  TBili  0.2  /  DBili  x   /  AST  12  /  ALT  13  /  AlkPhos  86  07-15    PT/INR - ( 2020 00:37 )   PT: 12.5 sec;   INR: 1.05 ratio         PTT - ( 2020 00:37 )  PTT:31.0 sec  CARDIAC MARKERS ( 15 Jul 2020 07:04 )  x     / x     / 27 U/L / x     / 2.3 ng/mL      Urinalysis Basic - ( 15 Jul 2020 00:34 )    Color: Yellow / Appearance: Clear / S.022 / pH: x  Gluc: x / Ketone: Negative  / Bili: Negative / Urobili: Negative   Blood: x / Protein: 30 mg/dL / Nitrite: Negative   Leuk Esterase: Negative / RBC: 4 /hpf / WBC 2 /HPF   Sq Epi: x / Non Sq Epi: 10 / Bacteria: Negative

## 2020-07-16 NOTE — CHART NOTE - NSCHARTNOTEFT_GEN_A_CORE
Had extensive [facility] discussion with son Miguel Angel Alanis who states that patient's Health care proxy was her  who is now . Son states he is pt's healthcare proxy now and makes decisions on her behalf. Patient has another child who son (Miguel Angel) states is "mentally retarded" and unable to make decisions. Son states he cares for other child and also makes decisions on his behalf. Per discussion with Mr. Alanis, patient is to be made DNR/DNI per family wishes. Family would like to continue antibiotics, tube feedings if necessary and other medical management at this time. MOLST form completed and placed in chart.

## 2020-07-16 NOTE — PROGRESS NOTE ADULT - ASSESSMENT
80F PMHx COPD, DM, schizoaffective, CHF, brought to ED by son for lethargy, AMS, found to be hypercarbic and septic. Son denies trauma. CT C spine shows C1 L anterior arch Fx and L posterior arch Fx. Exam: In collar, EO nox, not FC, GROVE spontaneously and purposefully.  - No acute neurosurgical intervention  - Collar all times  - Please obtain MRI C-spine when medically stable

## 2020-07-16 NOTE — PROGRESS NOTE ADULT - SUBJECTIVE AND OBJECTIVE BOX
Patient seen and examined at bedside.    --Anticoagulation--  heparin   Injectable 5000 Unit(s) SubCutaneous every 8 hours    T(C): 37 (07-16-20 @ 05:00), Max: 37.4 (07-15-20 @ 06:30)  HR: 52 (07-16-20 @ 05:00) (46 - 73)  BP: 99/54 (07-16-20 @ 05:00) (72/45 - 151/69)  RR: 25 (07-16-20 @ 05:00) (8 - 31)  SpO2: 99% (07-16-20 @ 05:00) (88% - 100%)  Wt(kg): --    Exam: In collar, EO nox, not FC, GROVE spontaneously and purposeful    Imaging:     CT CSpine (7/15):   Age-indeterminate, displaced left C1 anterior and posterior arch fracture as described, which may be acute given mild prevertebral soft tissue stranding/edema and no significant sclerosis at the fracture margin. Question mildly increased attenuation surrounding the thecal sac in the spinal canal. Extra-axial/epidural hematoma cannot be excluded.     CT Head (7/15):   No acute intracranial hemorrhage, large cortical infarct or mass effect.   Somewhat eccentric lucency in the right cerebellum, which be due to artifact although infarct cannot be excluded.    Labs:                         9.6    10.29 )-----------( 245      ( 16 Jul 2020 00:37 )             33.0   07-16    144  |  105  |  33<H>  ----------------------------<  124<H>  3.7   |  26  |  1.16    Ca    8.4      16 Jul 2020 00:37  Phos  4.0     07-16  Mg     1.6     07-16    TPro  5.6<L>  /  Alb  2.8<L>  /  TBili  0.2  /  DBili  x   /  AST  13  /  ALT  10  /  AlkPhos  83  07-16    PT/INR - ( 16 Jul 2020 00:37 )   PT: 12.5 sec;   INR: 1.05 ratio    PTT - ( 16 Jul 2020 00:37 )  PTT:31.0 sec    COVID-19 PCR: NotDetec (15 Jul 2020 06:08)

## 2020-07-16 NOTE — PROGRESS NOTE ADULT - ASSESSMENT
80F PMHx COPD (unknown level O2 at home), DM2, CHF, schizoaffective disorder brought in by relative due to altered mental status.     #Neuro  **AMS  - Intermittently agitated, reportedly A&Ox1 at baseline, pulling at BIPAP mask  - Precedex for agitation  - Haldol 1-2g IV PRN    **Schizoaffective disorder  - Hold home meds include risperdal, seroquel  - Will confirm med rec with son/family member    #Respiratory  **Hypercarbic respiratory failure, in the setting of possible COPD exacerbation, CHF exacerbation  - On admission, O2 sat 84% on RA, improving with BIPAP  - Initial VBG 7.17/80/32/28, improved to 7.2/64/22/24  - To be admitted to MICU for continued BIPAP/respiratory support    **Pulmonary edema, in the setting of suspected CHF exacerbation  - CXR demonstrating b/l patchy opacities indicating pulm edema; b/l pleural effusions  - CT demonstrating ground glass opacities b/l  - S/p lasix 40 IV  - Received lasix 40 IV today. Output: 1 L. Will observe ins & outs    **PNA, low suspicion  - COVID negative x 2  - s/p zosyn x 1  - Start ceftriaxone and azithro x 5 days, if community-acquired.   - F/u legionella, once negative can stop ceftriaxone  - F/u RVP    #Cardiovascular  **CHF exacerbation, suspected  - Admission EKG demonstrating NSR with LAD/nonspecific T wave abnormalities  - BMP 90698  - Received lasix 40 IV today. Output: 1 L. Will continue to monitor I&Os.    #GI  - No acute issues  - Pantoprazole prophylaxis - 40 IV daily    #/Renal  **MARIAN  - On admission, Cr 1.32, baseline unknown (patient has no other records aside from this admission)  - Current Cr 1.27  - Trend BMP    #Endocrine  - No acute issues  - T3 51, awaiting remaining thyroid panel results    #ID  **PNA  - COVID negative x 2  - s/p zosyn x 1  - Start ceftriaxone and azithro x 5 days, given likely community-acquired  - F/u legionella  - F/u RVP  - UA unremarkable  - F/u Bcx, Ucx    #Heme/Onc  - Hgb 10.1 (baseline unknown)  - Trend CBC    #DVT Prophylaxis  - Heparin SubQ 2/2 MARIAN    #GOC  - DNR/DNI 80F PMHx COPD (unknown level O2 at home), DM2, CHF, schizoaffective disorder brought in by relative due to altered mental status found to be in hypoxemic and hypercapnic respiratory failure.    #Neuro  **AMS  - Intermittently agitated, reportedly A&Ox1 at baseline, pulling at BIPAP mask  - Precedex for agitation  - Haldol 1-2g IV PRN    **Schizoaffective disorder  - Hold home meds include risperdal, seroquel  - Will confirm med rec with son/family member    #Respiratory  **Hypercarbic respiratory failure, in the setting of possible COPD exacerbation vs CHF exacerbation  - On admission, O2 sat 84% on RA, improving with BIPAP  - Initial VBG 7.17/80/32/28, improved to 7.2/64/22/24  - To be admitted to MICU for continued BIPAP/respiratory support    **Pulmonary edema, in the setting of suspected CHF exacerbation  - CXR demonstrating b/l patchy opacities indicating pulm edema; b/l pleural effusions  - CT demonstrating ground glass opacities b/l  - S/p lasix 40 IV  - Received lasix 40 IV today. Output: 1 L. Will observe ins & outs    **PNA, low suspicion  - COVID negative x 2  - s/p zosyn x 1  - Start ceftriaxone and azithro x 5 days, if community-acquired.   - F/u legionella, once negative can stop azithromycin  - F/u RVP    #Cardiovascular  **CHF exacerbation, suspected  - Admission EKG demonstrating NSR with LAD/nonspecific T wave abnormalities  - BMP [id number]  - Received lasix 40 IV yesterday. Output: approximately 2.1 L. Will continue to monitor I&Os.  - TTE done in AM, awaiting results      #GI  - No acute issues  - Pantoprazole prophylaxis - 40 IV daily    #/Renal  **MARIAN  - On admission, Cr 1.32, baseline unknown (patient has no other records aside from this admission)  - Current Cr 1.16  - Trend BMP    #Endocrine  - Type 2 DM  - on insulin sliding scale, POCT glucose AM: 94  - No acute issues  - T3 51, awaiting TSH Ab and T4    #MSK  - CT spine:  Age-indeterminate, displaced left C1 anterior and posterior arch fracture as described, which may be acute given mild prevertebral soft tissue stranding/edema and no significant sclerosis at the fracture margin. Question mildly increased attenuation surrounding the thecal sac in the spinal canal. Extra-axial/epidural hematoma cannot be excluded. MRI is recommended for further evaluation of ligamentous injury and extra-axial hematoma.  Nonspecific bilateral mastoid opacification. Recommend clinical correlation to assess acute mastoiditis.  - Neurosx saw pt recs no acute neurosx intervention, continue C-collar and MRI C-spine when medically stable    #ID  **PNA  - COVID negative x 2  - s/p zosyn x 1  - Start ceftriaxone and azithro x 5 days, given likely community-acquired  - F/u legionella  - F/u RVP  - UA unremarkable  - Bcx: initial: Staph. epidermidies, 2nd: no growth, urine cx: no growth    #Heme/Onc  - Hgb 9.6 (baseline unknown)  - Trend CBC    #DVT Prophylaxis  - Heparin SubQ 2/2 MARIAN    #GOC  - DNR/DNI 80F PMHx of recent blindess, COPD (unknown level O2 at home), DM2, CHF, schizoaffective disorder brought in by relative due to altered mental status found to be in hypoxemic and hypercapnic respiratory failure.    #Neuro  **AMS  - Intermittently agitated, reportedly A&Ox1 at baseline, pulling at BIPAP mask  - Precedex for agitation  - Haldol 1-2g IV PRN    **Schizoaffective disorder  - Hold home meds include risperdal, seroquel  - Will confirm med rec with son/family member    #Respiratory  **Hypercarbic respiratory failure, in the setting of possible COPD exacerbation vs CHF exacerbation  - On admission, O2 sat 84% on RA, improving with BIPAP  - Initial VBG 7.17/80/32/28, improved to 7.2/64/22/24  - FiO2 was increased from 30% to 40% and on ABG pO2 improved to 100, pCO2 decreased to 56%, pH: 7.33, HCO3 to 29, and arterial oxygen sat.: 97.  - To be admitted to MICU for continued BIPAP/respiratory support  - Trend ABGs     **Pulmonary edema, in the setting of suspected CHF exacerbation  - CXR demonstrating b/l patchy opacities indicating pulm edema; b/l pleural effusions  - CT demonstrating ground glass opacities b/l  - S/p lasix 40 IV  - Received lasix 40 IV today. Output: 1 L. Will observe ins & outs    **PNA, low suspicion  - COVID negative x 2  - s/p zosyn x 1  - Start ceftriaxone and azithro x 5 days, if community-acquired.   - F/u legionella, once negative can stop azithromycin  - F/u RVP    #Cardiovascular  **CHF exacerbation, suspected  - Admission EKG demonstrating NSR with LAD/nonspecific T wave abnormalities  - BMP [id number]  - Received lasix 40 IV yesterday. Output: approximately 2.1 L. Will continue to monitor I&Os.  - TTE done in AM, awaiting results      #GI  - No acute issues  - Pantoprazole prophylaxis - 40 IV daily    #/Renal  **MARIAN  - On admission, Cr 1.32, baseline unknown (patient has no other records aside from this admission)  - Current Cr 1.16  - Trend BMP    #Endocrine  - Type 2 DM  - on insulin sliding scale, POCT glucose AM: 94  - No acute issues  - T3 51, awaiting TSH Ab and T4    #MSK  - CT spine:  Age-indeterminate, displaced left C1 anterior and posterior arch fracture as described, which may be acute given mild prevertebral soft tissue stranding/edema and no significant sclerosis at the fracture margin. Question mildly increased attenuation surrounding the thecal sac in the spinal canal. Extra-axial/epidural hematoma cannot be excluded. MRI is recommended for further evaluation of ligamentous injury and extra-axial hematoma.  Nonspecific bilateral mastoid opacification. Recommend clinical correlation to assess acute mastoiditis.  - Neurosx saw pt recs no acute neurosx intervention, continue C-collar and MRI C-spine when medically stable    #ID  **PNA  - COVID negative x 2  - s/p zosyn x 1  - Start ceftriaxone and azithro x 5 days, given likely community-acquired  - F/u legionella  - F/u RVP  - UA unremarkable  - Bcx: initial: Staph. epidermidies, 2nd: no growth, urine cx: no growth    #Heme/Onc  - Hgb 9.6 (baseline unknown)  - Trend CBC    #DVT Prophylaxis  - Heparin SubQ 2/2 MARIAN    #GOC  - DNR/DNI 80F PMHx of recent blindess, COPD (unknown level O2 at home), DM2, CHF, schizoaffective disorder brought in by relative due to altered mental status found to be in hypoxemic and hypercapnic respiratory failure.    #Neuro  **AMS  - Intermittently agitated, reportedly A&Ox1 at baseline, pulling at BIPAP mask  - Precedex for agitation  - Haldol 1-2g IV PRN    **Schizoaffective disorder  - Hold home meds include risperdal, seroquel  - Will confirm med rec with son/family member    #Respiratory  **Hypercarbic respiratory failure, in the setting of possible COPD exacerbation vs CHF exacerbation  - On admission, O2 sat 84% on RA, improving with BIPAP  - Initial VBG 7.17/80/32/28, improved to 7.2/64/22/24  - FiO2 was increased from 30% to 40% and on ABG pO2 improved to 100, pCO2 decreased to 56%, pH: 7.33, HCO3 to 29, and arterial oxygen sat.: 97.  - To be admitted to MICU for continued BIPAP/respiratory support  - Trend ABGs     **Pulmonary edema, in the setting of suspected CHF exacerbation  - CXR demonstrating b/l patchy opacities indicating pulm edema; b/l pleural effusions  - CT demonstrating ground glass opacities b/l  - S/p lasix 40 IV  - Received lasix 40 IV today. Output: 1 L. Will observe ins & outs    **PNA, low suspicion  - COVID negative x 2  - s/p zosyn x 1  - Start ceftriaxone and azithro x 5 days, if community-acquired.   - F/u legionella, once negative can stop azithromycin  - F/u RVP    #Cardiovascular  **CHF exacerbation, suspected  - Admission EKG demonstrating NSR with LAD/nonspecific T wave abnormalities  - BMP [id number]  - Received lasix 40 IV yesterday. Output: approximately 2.1 L. Will continue to monitor I&Os.  - TTE done in AM, awaiting results      #GI  - No acute issues  - Pantoprazole prophylaxis - 40 IV daily    #/Renal  **MARIAN  - On admission, Cr 1.32, baseline unknown (patient has no other records aside from this admission)  - Current Cr 1.16  - Trend BMP    #Endocrine  - Type 2 DM  - on insulin sliding scale, POCT glucose AM: 94  - No acute issues  - T3 51, awaiting TSH Ab and T4    #MSK  - CT spine:  Age-indeterminate, displaced left C1 anterior and posterior arch fracture as described, which may be acute given mild prevertebral soft tissue stranding/edema and no significant sclerosis at the fracture margin. Question mildly increased attenuation surrounding the thecal sac in the spinal canal. Extra-axial/epidural hematoma cannot be excluded. MRI is recommended for further evaluation of ligamentous injury and extra-axial hematoma.  Nonspecific bilateral mastoid opacification. Recommend clinical correlation to assess acute mastoiditis.  - Neurosx saw pt recs no acute neurosx intervention, continue C-collar and MRI C-spine when medically stable    #ID  **PNA  - COVID negative x 2  - s/p zosyn x 1  - Start ceftriaxone and azithro x 5 days, given likely community-acquired  - F/u legionella  - F/u RVP  - UA unremarkable  - Bcx: initial: Staph. epidermidis, 2nd: no growth, urine cx: no growth    #Heme/Onc  - Hgb 9.6 (baseline unknown)  - Trend CBC    #DVT Prophylaxis  - Heparin SubQ 2/2 MARIAN    #GOC  - DNR/DNI

## 2020-07-17 LAB
A1C WITH ESTIMATED AVERAGE GLUCOSE RESULT: 6.4 % — HIGH (ref 4–5.6)
ALBUMIN SERPL ELPH-MCNC: 3 G/DL — LOW (ref 3.3–5)
ALP SERPL-CCNC: 94 U/L — SIGNIFICANT CHANGE UP (ref 40–120)
ALT FLD-CCNC: 14 U/L — SIGNIFICANT CHANGE UP (ref 10–45)
ANION GAP SERPL CALC-SCNC: 18 MMOL/L — HIGH (ref 5–17)
ANION GAP SERPL CALC-SCNC: 22 MMOL/L — HIGH (ref 5–17)
APTT BLD: 40.3 SEC — HIGH (ref 27.5–35.5)
AST SERPL-CCNC: 24 U/L — SIGNIFICANT CHANGE UP (ref 10–40)
BASOPHILS # BLD AUTO: 0.06 K/UL — SIGNIFICANT CHANGE UP (ref 0–0.2)
BASOPHILS NFR BLD AUTO: 0.5 % — SIGNIFICANT CHANGE UP (ref 0–2)
BILIRUB SERPL-MCNC: 0.2 MG/DL — SIGNIFICANT CHANGE UP (ref 0.2–1.2)
BUN SERPL-MCNC: 25 MG/DL — HIGH (ref 7–23)
BUN SERPL-MCNC: 27 MG/DL — HIGH (ref 7–23)
CALCIUM SERPL-MCNC: 8.8 MG/DL — SIGNIFICANT CHANGE UP (ref 8.4–10.5)
CALCIUM SERPL-MCNC: 9.3 MG/DL — SIGNIFICANT CHANGE UP (ref 8.4–10.5)
CHLORIDE SERPL-SCNC: 102 MMOL/L — SIGNIFICANT CHANGE UP (ref 96–108)
CHLORIDE SERPL-SCNC: 102 MMOL/L — SIGNIFICANT CHANGE UP (ref 96–108)
CO2 SERPL-SCNC: 24 MMOL/L — SIGNIFICANT CHANGE UP (ref 22–31)
CO2 SERPL-SCNC: 24 MMOL/L — SIGNIFICANT CHANGE UP (ref 22–31)
CREAT SERPL-MCNC: 1 MG/DL — SIGNIFICANT CHANGE UP (ref 0.5–1.3)
CREAT SERPL-MCNC: 1.06 MG/DL — SIGNIFICANT CHANGE UP (ref 0.5–1.3)
EOSINOPHIL # BLD AUTO: 0.31 K/UL — SIGNIFICANT CHANGE UP (ref 0–0.5)
EOSINOPHIL NFR BLD AUTO: 2.7 % — SIGNIFICANT CHANGE UP (ref 0–6)
ESTIMATED AVERAGE GLUCOSE: 137 MG/DL — HIGH (ref 68–114)
GAS PNL BLDA: SIGNIFICANT CHANGE UP
GLUCOSE BLDC GLUCOMTR-MCNC: 102 MG/DL — HIGH (ref 70–99)
GLUCOSE BLDC GLUCOMTR-MCNC: 122 MG/DL — HIGH (ref 70–99)
GLUCOSE BLDC GLUCOMTR-MCNC: 126 MG/DL — HIGH (ref 70–99)
GLUCOSE BLDC GLUCOMTR-MCNC: 85 MG/DL — SIGNIFICANT CHANGE UP (ref 70–99)
GLUCOSE SERPL-MCNC: 130 MG/DL — HIGH (ref 70–99)
GLUCOSE SERPL-MCNC: 131 MG/DL — HIGH (ref 70–99)
HCT VFR BLD CALC: 36.7 % — SIGNIFICANT CHANGE UP (ref 34.5–45)
HGB BLD-MCNC: 10.6 G/DL — LOW (ref 11.5–15.5)
IMM GRANULOCYTES NFR BLD AUTO: 0.4 % — SIGNIFICANT CHANGE UP (ref 0–1.5)
INR BLD: 1.94 RATIO — HIGH (ref 0.88–1.16)
LYMPHOCYTES # BLD AUTO: 0.9 K/UL — LOW (ref 1–3.3)
LYMPHOCYTES # BLD AUTO: 7.9 % — LOW (ref 13–44)
MAGNESIUM SERPL-MCNC: 1.4 MG/DL — LOW (ref 1.6–2.6)
MAGNESIUM SERPL-MCNC: 1.9 MG/DL — SIGNIFICANT CHANGE UP (ref 1.6–2.6)
MCHC RBC-ENTMCNC: 24.5 PG — LOW (ref 27–34)
MCHC RBC-ENTMCNC: 28.9 GM/DL — LOW (ref 32–36)
MCV RBC AUTO: 84.8 FL — SIGNIFICANT CHANGE UP (ref 80–100)
MONOCYTES # BLD AUTO: 0.94 K/UL — HIGH (ref 0–0.9)
MONOCYTES NFR BLD AUTO: 8.3 % — SIGNIFICANT CHANGE UP (ref 2–14)
MRSA PCR RESULT.: SIGNIFICANT CHANGE UP
NEUTROPHILS # BLD AUTO: 9.1 K/UL — HIGH (ref 1.8–7.4)
NEUTROPHILS NFR BLD AUTO: 80.2 % — HIGH (ref 43–77)
NRBC # BLD: 0 /100 WBCS — SIGNIFICANT CHANGE UP (ref 0–0)
PHOSPHATE SERPL-MCNC: 3.4 MG/DL — SIGNIFICANT CHANGE UP (ref 2.5–4.5)
PHOSPHATE SERPL-MCNC: 4.3 MG/DL — SIGNIFICANT CHANGE UP (ref 2.5–4.5)
PLATELET # BLD AUTO: 272 K/UL — SIGNIFICANT CHANGE UP (ref 150–400)
POTASSIUM SERPL-MCNC: 3.4 MMOL/L — LOW (ref 3.5–5.3)
POTASSIUM SERPL-MCNC: 3.8 MMOL/L — SIGNIFICANT CHANGE UP (ref 3.5–5.3)
POTASSIUM SERPL-SCNC: 3.4 MMOL/L — LOW (ref 3.5–5.3)
POTASSIUM SERPL-SCNC: 3.8 MMOL/L — SIGNIFICANT CHANGE UP (ref 3.5–5.3)
PROT SERPL-MCNC: 6.6 G/DL — SIGNIFICANT CHANGE UP (ref 6–8.3)
PROTHROM AB SERPL-ACNC: 22.4 SEC — HIGH (ref 10.6–13.6)
RBC # BLD: 4.33 M/UL — SIGNIFICANT CHANGE UP (ref 3.8–5.2)
RBC # FLD: 16.1 % — HIGH (ref 10.3–14.5)
S AUREUS DNA NOSE QL NAA+PROBE: DETECTED
SODIUM SERPL-SCNC: 144 MMOL/L — SIGNIFICANT CHANGE UP (ref 135–145)
SODIUM SERPL-SCNC: 148 MMOL/L — HIGH (ref 135–145)
WBC # BLD: 11.35 K/UL — HIGH (ref 3.8–10.5)
WBC # FLD AUTO: 11.35 K/UL — HIGH (ref 3.8–10.5)

## 2020-07-17 PROCEDURE — 99291 CRITICAL CARE FIRST HOUR: CPT

## 2020-07-17 PROCEDURE — 93010 ELECTROCARDIOGRAM REPORT: CPT

## 2020-07-17 RX ORDER — MIDODRINE HYDROCHLORIDE 2.5 MG/1
20 TABLET ORAL EVERY 8 HOURS
Refills: 0 | Status: DISCONTINUED | OUTPATIENT
Start: 2020-07-17 | End: 2020-07-17

## 2020-07-17 RX ORDER — HALOPERIDOL DECANOATE 100 MG/ML
1 INJECTION INTRAMUSCULAR EVERY 4 HOURS
Refills: 0 | Status: DISCONTINUED | OUTPATIENT
Start: 2020-07-17 | End: 2020-07-17

## 2020-07-17 RX ORDER — PIPERACILLIN AND TAZOBACTAM 4; .5 G/20ML; G/20ML
3.38 INJECTION, POWDER, LYOPHILIZED, FOR SOLUTION INTRAVENOUS ONCE
Refills: 0 | Status: DISCONTINUED | OUTPATIENT
Start: 2020-07-17 | End: 2020-07-17

## 2020-07-17 RX ORDER — HALOPERIDOL DECANOATE 100 MG/ML
1 INJECTION INTRAMUSCULAR EVERY 6 HOURS
Refills: 0 | Status: DISCONTINUED | OUTPATIENT
Start: 2020-07-17 | End: 2020-07-23

## 2020-07-17 RX ORDER — MAGNESIUM SULFATE 500 MG/ML
2 VIAL (ML) INJECTION ONCE
Refills: 0 | Status: COMPLETED | OUTPATIENT
Start: 2020-07-17 | End: 2020-07-17

## 2020-07-17 RX ORDER — POTASSIUM CHLORIDE 20 MEQ
40 PACKET (EA) ORAL ONCE
Refills: 0 | Status: COMPLETED | OUTPATIENT
Start: 2020-07-17 | End: 2020-07-17

## 2020-07-17 RX ORDER — VASOPRESSIN 20 [USP'U]/ML
0.04 INJECTION INTRAVENOUS
Qty: 50 | Refills: 0 | Status: DISCONTINUED | OUTPATIENT
Start: 2020-07-17 | End: 2020-07-17

## 2020-07-17 RX ORDER — DEXTROSE 50 % IN WATER 50 %
25 SYRINGE (ML) INTRAVENOUS ONCE
Refills: 0 | Status: DISCONTINUED | OUTPATIENT
Start: 2020-07-17 | End: 2020-07-17

## 2020-07-17 RX ORDER — MIDODRINE HYDROCHLORIDE 2.5 MG/1
20 TABLET ORAL EVERY 8 HOURS
Refills: 0 | Status: DISCONTINUED | OUTPATIENT
Start: 2020-07-17 | End: 2020-07-19

## 2020-07-17 RX ORDER — PIPERACILLIN AND TAZOBACTAM 4; .5 G/20ML; G/20ML
3.38 INJECTION, POWDER, LYOPHILIZED, FOR SOLUTION INTRAVENOUS EVERY 8 HOURS
Refills: 0 | Status: DISCONTINUED | OUTPATIENT
Start: 2020-07-17 | End: 2020-07-17

## 2020-07-17 RX ORDER — FUROSEMIDE 40 MG
40 TABLET ORAL ONCE
Refills: 0 | Status: COMPLETED | OUTPATIENT
Start: 2020-07-17 | End: 2020-07-17

## 2020-07-17 RX ORDER — CEFTRIAXONE 500 MG/1
1000 INJECTION, POWDER, FOR SOLUTION INTRAMUSCULAR; INTRAVENOUS EVERY 24 HOURS
Refills: 0 | Status: COMPLETED | OUTPATIENT
Start: 2020-07-18 | End: 2020-07-21

## 2020-07-17 RX ADMIN — QUETIAPINE FUMARATE 25 MILLIGRAM(S): 200 TABLET, FILM COATED ORAL at 18:01

## 2020-07-17 RX ADMIN — MIDODRINE HYDROCHLORIDE 20 MILLIGRAM(S): 2.5 TABLET ORAL at 11:29

## 2020-07-17 RX ADMIN — Medication 50 GRAM(S): at 01:28

## 2020-07-17 RX ADMIN — QUETIAPINE FUMARATE 25 MILLIGRAM(S): 200 TABLET, FILM COATED ORAL at 05:11

## 2020-07-17 RX ADMIN — HALOPERIDOL DECANOATE 1 MILLIGRAM(S): 100 INJECTION INTRAMUSCULAR at 13:17

## 2020-07-17 RX ADMIN — MIDODRINE HYDROCHLORIDE 20 MILLIGRAM(S): 2.5 TABLET ORAL at 18:01

## 2020-07-17 RX ADMIN — Medication 40 MILLIEQUIVALENT(S): at 01:28

## 2020-07-17 RX ADMIN — RIVAROXABAN 20 MILLIGRAM(S): KIT at 11:32

## 2020-07-17 RX ADMIN — Medication 40 MILLIGRAM(S): at 11:32

## 2020-07-17 RX ADMIN — CHLORHEXIDINE GLUCONATE 1 APPLICATION(S): 213 SOLUTION TOPICAL at 07:19

## 2020-07-17 RX ADMIN — CEFTRIAXONE 100 MILLIGRAM(S): 500 INJECTION, POWDER, FOR SOLUTION INTRAMUSCULAR; INTRAVENOUS at 11:30

## 2020-07-17 RX ADMIN — PANTOPRAZOLE SODIUM 40 MILLIGRAM(S): 20 TABLET, DELAYED RELEASE ORAL at 11:31

## 2020-07-17 NOTE — DIETITIAN INITIAL EVALUATION ADULT. - PERTINENT MEDS FT
MEDICATIONS  (STANDING):  cefTRIAXone   IVPB 1000 milliGRAM(s) IV Intermittent every 24 hours  chlorhexidine 4% Liquid 1 Application(s) Topical <User Schedule>  dexMEDEtomidine Infusion 0.2 MICROgram(s)/kG/Hr (4.5 mL/Hr) IV Continuous <Continuous>  dextrose 5%. 1000 milliLiter(s) (50 mL/Hr) IV Continuous <Continuous>  dextrose 50% Injectable 12.5 Gram(s) IV Push once  dextrose 50% Injectable 25 Gram(s) IV Push once  dextrose 50% Injectable 25 Gram(s) IV Push once  insulin lispro (HumaLOG) corrective regimen sliding scale   SubCutaneous every 6 hours  norepinephrine Infusion 0.05 MICROgram(s)/kG/Min (7.69 mL/Hr) IV Continuous <Continuous>  pantoprazole  Injectable 40 milliGRAM(s) IV Push daily  QUEtiapine 25 milliGRAM(s) Oral two times a day  rivaroxaban 20 milliGRAM(s) Oral daily

## 2020-07-17 NOTE — DIETITIAN INITIAL EVALUATION ADULT. - ENERGY NEEDS
Ht: 60"  Wt: 181  BMI: 35.3 kg/m2   IBW: 100 (+/-10%)     181% IBW  Edema: 2+ generalized, 3+ B/L legs, ankles, feet        Skin: no pressure injuries documented

## 2020-07-17 NOTE — DIETITIAN INITIAL EVALUATION ADULT. - ENTERAL
If EN initiated, recommend Vital 1.2 at 60 cc/hr x 18 hrs provides 1296 kcals, 81 gm protein, 876cc free water  meets 28 Kcal/Kg, 1.8Gm/kg  IBW 45.5 kg

## 2020-07-17 NOTE — DIETITIAN INITIAL EVALUATION ADULT. - PHYSICAL APPEARANCE
obese/Pt wearing cervical collar. Nutrition Focused Physical Assessment done, RN in room, no overt signs of fat, muscle loss

## 2020-07-17 NOTE — PROGRESS NOTE ADULT - ASSESSMENT
80F PMHx of recent blindess, COPD (unknown level O2 at home), DM2, CHF, schizoaffective disorder brought in by relative due to altered mental status found to be in hypoxemic and hypercapnic respiratory failure.    #Neuro  **AMS  - Intermittently agitated, reportedly A&Ox1 at baseline, pulling at BIPAP mask  - Precedex for agitation  - Haldol 1-2g IV PRN    **Schizoaffective disorder  - Hold home meds include risperdal, seroquel  - Will confirm med rec with son/family member    #Respiratory  **Hypercarbic respiratory failure, in the setting of possible COPD exacerbation vs CHF exacerbation  - On admission, O2 sat 84% on RA, improving with BIPAP  - Initial VBG 7.17/80/32/28, improved to 7.2/64/22/24  - FiO2 was increased from 30% to 40% and on ABG pO2 improved to 100, pCO2 decreased to 56%, pH: 7.33, HCO3 to 29, and arterial oxygen sat.: 97.  - To be admitted to MICU for continued BIPAP/respiratory support  - Trend ABGs     **Pulmonary edema, in the setting of suspected CHF exacerbation  - CXR demonstrating b/l patchy opacities indicating pulm edema; b/l pleural effusions  - CT demonstrating ground glass opacities b/l  - S/p lasix 40 IV  - Received lasix 40 IV today. Output: 1 L. Will observe ins & outs    **PNA, low suspicion  - COVID negative x 2  - s/p zosyn x 1  - Start ceftriaxone and azithro x 5 days, if community-acquired.   - F/u legionella, once negative can stop azithromycin  - F/u RVP    #Cardiovascular  **CHF exacerbation, suspected  - Admission EKG demonstrating NSR with LAD/nonspecific T wave abnormalities  - BMP [id number]  - Received lasix 40 IV yesterday. Output: approximately 2.1 L. Will continue to monitor I&Os.  - TTE done in AM, awaiting results      #GI  - No acute issues  - Pantoprazole prophylaxis - 40 IV daily    #/Renal  **MARIAN  - On admission, Cr 1.32, baseline unknown (patient has no other records aside from this admission)  - Current Cr 1.16  - Trend BMP    #Endocrine  - Type 2 DM  - on insulin sliding scale, POCT glucose AM: 94  - No acute issues  - T3 51, awaiting TSH Ab and T4    #MSK  - CT spine:  Age-indeterminate, displaced left C1 anterior and posterior arch fracture as described, which may be acute given mild prevertebral soft tissue stranding/edema and no significant sclerosis at the fracture margin. Question mildly increased attenuation surrounding the thecal sac in the spinal canal. Extra-axial/epidural hematoma cannot be excluded. MRI is recommended for further evaluation of ligamentous injury and extra-axial hematoma.  Nonspecific bilateral mastoid opacification. Recommend clinical correlation to assess acute mastoiditis.  - Neurosx saw pt recs no acute neurosx intervention, continue C-collar and MRI C-spine when medically stable    #ID  **PNA  - COVID negative x 2  - s/p zosyn x 1  - Start ceftriaxone and azithro x 5 days, given likely community-acquired  - F/u legionella  - F/u RVP  - UA unremarkable  - Bcx: initial: Staph. epidermidis, 2nd: no growth, urine cx: no growth    #Heme/Onc  - Hgb 9.6 (baseline unknown)  - Trend CBC    #DVT Prophylaxis  - Heparin SubQ 2/2 MARIAN    #GOC  - DNR/DNI 80F PMHx of recent blindess, COPD (unknown level O2 at home), DM2, CHF, schizoaffective disorder brought in by relative due to altered mental status found to be in hypoxemic and hypercapnic respiratory failure.    #Neuro  **AMS  - Intermittently agitated, reportedly A&Ox1 at baseline  - TPrecedex for agitation  - Haldol 1-2g IV PRN    **Schizoaffective disorder  - Hold home meds include risperdal, seroquel  - Will confirm med rec with son/family member    #Respiratory  **Hypercarbic respiratory failure, in the setting of possible COPD exacerbation vs CHF exacerbation  - On admission, O2 sat 84% on RA, improving with BIPAP  - Initial VBG 7.17/80/32/28, improved to 7.2/64/22/24  - FiO2 was increased from 30% to 40% and on ABG pO2 improved to 100, pCO2 decreased to 56%, pH: 7.33, HCO3 to 29, and arterial oxygen sat.: 97.  - To be admitted to MICU for continued BIPAP/respiratory support  - Trend ABGs     **Pulmonary edema, in the setting of suspected CHF exacerbation  - CXR demonstrating b/l patchy opacities indicating pulm edema; b/l pleural effusions  - CT demonstrating ground glass opacities b/l  - S/p lasix 40 IV  - Received lasix 40 IV today. Output: 1 L. Will observe ins & outs    **PNA, low suspicion  - COVID negative x 2  - s/p zosyn x 1  - Start ceftriaxone and azithro x 5 days, if community-acquired.   - F/u legionella, once negative can stop azithromycin  - F/u RVP    #Cardiovascular  **CHF exacerbation, suspected  - Admission EKG demonstrating NSR with LAD/nonspecific T wave abnormalities  - BMP [id number]  - Received lasix 40 IV yesterday. Output: approximately 2.1 L. Will continue to monitor I&Os.  - TTE done in AM, awaiting results      #GI  - No acute issues  - Pantoprazole prophylaxis - 40 IV daily    #/Renal  **MARIAN  - On admission, Cr 1.32, baseline unknown (patient has no other records aside from this admission)  - Current Cr 1.16  - Trend BMP    #Endocrine  - Type 2 DM  - on insulin sliding scale, POCT glucose AM: 94  - No acute issues  - T3 51, awaiting TSH Ab and T4    #MSK  - CT spine:  Age-indeterminate, displaced left C1 anterior and posterior arch fracture as described, which may be acute given mild prevertebral soft tissue stranding/edema and no significant sclerosis at the fracture margin. Question mildly increased attenuation surrounding the thecal sac in the spinal canal. Extra-axial/epidural hematoma cannot be excluded. MRI is recommended for further evaluation of ligamentous injury and extra-axial hematoma.  Nonspecific bilateral mastoid opacification. Recommend clinical correlation to assess acute mastoiditis.  - Neurosx saw pt recs no acute neurosx intervention, continue C-collar and MRI C-spine when medically stable    #ID  **PNA  - COVID negative x 2  - s/p zosyn x 1  - Start ceftriaxone and azithro x 5 days, given likely community-acquired  - F/u legionella  - F/u RVP  - UA unremarkable  - Bcx: initial: Staph. epidermidis, 2nd: no growth, urine cx: no growth    #Heme/Onc  - Hgb 9.6 (baseline unknown)  - Trend CBC    #DVT Prophylaxis  - Heparin SubQ 2/2 MARIAN    #GOC  - DNR/DNI 80F PMHx of recent blindess, COPD (unknown level O2 at home), DM2, CHF, schizoaffective disorder brought in by relative due to altered mental status found to be in hypoxemic and hypercapnic respiratory failure.    #Neuro  **AMS  - Intermittently agitated, reportedly A&Ox1 at baseline  - Off Precedex  - On Home med Seroquel 25 mg    **Schizoaffective disorder  - As above    #Respiratory  **Hypercarbic respiratory failure, in the setting of possible COPD exacerbation vs CHF exacerbation  - On admission, O2 sat 84% on RA, improved with BIPAP  - Initial VBG 7.17/80/32/28, improved to 7.2/64/22/24  - Continue on 2 L NC AM and BiPAP PM  - Trend ABGs    **Pulmonary edema, in the setting of suspected CHF exacerbation  - CXR demonstrating b/l patchy opacities indicating pulm edema; b/l pleural effusions  - CT demonstrating ground glass opacities b/l  - Receiving lasix 40 IV today. Output: 2.1 L. Will observe ins & outs.    **PNA, low suspicion  - COVID negative x 2  - s/p zosyn x 1  - On ceftriaxone Day 4, if community-acquired.   - Legionella negative, can stop azithromycin  - F/u RVP    #Cardiovascular  **CHF exacerbation, suspected  - Admission EKG demonstrating NSR with LAD/nonspecific T wave abnormalities  - BMP [id number]  - Received lasix 40 IV yesterday. Output: approximately 2.1 L. Will continue to monitor I&Os.  - TTE: < from: Transthoracic Echocardiogram (07.16.20 @ 08:04) >  < from: Transthoracic Echocardiogram (07.16.20 @ 08:04) >    EF (Visual Estimate): 25 %  Severely dilated left ventricle.  Severely decreased left ventricular systolic function.  Diffuse left ventricular hypokinesis.  Severe functional mitral regurgitation.  Transcatheter aortic valve replacement.Mild-moderate  paravalvular aortic regurgitation along the anterior aspect  of the prosthesis.  Moderate-severe pulmoanry hypertension. Estimated PASP 60  mmHg.    #GI  - No acute issues  - Pantoprazole prophylaxis - 40 IV daily    #/Renal  **MARIAN  - On admission, Cr 1.32, baseline unknown (patient has no other records aside from this admission)  - Current Cr 1.06  - Trend [facility]    #Endocrine  - Type 2 DM  - on insulin sliding scale, POCT glucose: 126, HbA1c: 6.4  - No acute issues  - T3 51, awaiting TSH Ab and T4    #MSK  - CT spine:  Age-indeterminate, displaced left C1 anterior and posterior arch fracture as described, which may be acute given mild prevertebral soft tissue stranding/edema and no significant sclerosis at the fracture margin. Question mildly increased attenuation surrounding the thecal sac in the spinal canal. Extra-axial/epidural hematoma cannot be excluded. MRI is recommended for further evaluation of ligamentous injury and extra-axial hematoma.  Nonspecific bilateral mastoid opacification. Recommend clinical correlation to assess acute mastoiditis.  - Neurosx saw pt recs no acute neurosx intervention, continue C-collar and MRI C-spine when medically stable    #ID  **PNA  - COVID negative x 2  - On ceftriaxone, likely community-acquired  - Legionella negative, off azithromycin  - UA unremarkable  - Bcx: initial: Staph. epidermidis, 2nd: no growth, urine cx: no growth    #Heme/Onc  - Hgb 10.6 (baseline unknown)  - Trend CBC    #DVT Prophylaxis  - Heparin SubQ 2/2 MARIAN    #GOC  - DNR/DNI 80F PMHx of recent blindess, COPD (unknown level O2 at home), DM2, CHF, schizoaffective disorder brought in by relative due to altered mental status found to be in hypoxemic and hypercapnic respiratory failure.    #Neuro  **AMS  - Intermittently agitated, reportedly A&Ox1 at baseline  - Off Precedex  - On Home med Seroquel 25 mg    **Schizoaffective disorder  - As above    #Respiratory  **Hypercarbic respiratory failure, in the setting of possible COPD exacerbation vs CHF exacerbation  - On admission, O2 sat 84% on RA, improved with BIPAP  - Initial VBG 7.17/80/32/28, improved to 7.2/64/22/24  - Continue on 2 L NC AM and BiPAP PM  - Trend ABGs    **Pulmonary edema, in the setting of suspected CHF exacerbation  - CXR demonstrating b/l patchy opacities indicating pulm edema; b/l pleural effusions  - CT demonstrating ground glass opacities b/l  - Receiving lasix 40 IV today. Output: 2.1 L. Will observe ins & outs.    **PNA, low suspicion  - COVID negative x 2  - s/p zosyn x 1  - On ceftriaxone Day 4, if community-acquired.   - Legionella negative, can stop azithromycin  - F/u RVP    #Cardiovascular  **CHF exacerbation, suspected  - Admission EKG demonstrating NSR with LAD/nonspecific T wave abnormalities  - BMP [id number]  - Received lasix 40 IV yesterday. Output: approximately 2.1 L. Will continue to monitor I&Os.  - TTE: < from: Transthoracic Echocardiogram (07.16.20 @ 08:04) >  < from: Transthoracic Echocardiogram (07.16.20 @ 08:04) >    EF (Visual Estimate): 25 %  Severely dilated left ventricle.  Severely decreased left ventricular systolic function.  Diffuse left ventricular hypokinesis.  Severe functional mitral regurgitation.  Transcatheter aortic valve replacement.Mild-moderate  paravalvular aortic regurgitation along the anterior aspect  of the prosthesis.  Moderate-severe pulmoanry hypertension. Estimated PASP 60  mmHg.    #GI  - No acute issues  - Pantoprazole prophylaxis - 40 IV daily    #/Renal  **MARIAN  - On admission, Cr 1.32, baseline unknown (patient has no other records aside from this admission)  - Current Cr 1.06  - Trend [facility]    #Endocrine  - Type 2 DM  - on insulin sliding scale, POCT glucose: 126, HbA1c: 6.4  - No acute issues  - T3 51, awaiting TSH Ab and T4    #MSK  - CT spine:  Age-indeterminate, displaced left C1 anterior and posterior arch fracture as described, which may be acute given mild prevertebral soft tissue stranding/edema and no significant sclerosis at the fracture margin. Question mildly increased attenuation surrounding the thecal sac in the spinal canal. Extra-axial/epidural hematoma cannot be excluded. MRI is recommended for further evaluation of ligamentous injury and extra-axial hematoma.  Nonspecific bilateral mastoid opacification. Recommend clinical correlation to assess acute mastoiditis.  - Neurosx saw pt recs no acute neurosx intervention, continue C-collar and MRI C-spine when medically stable    #ID  **PNA  - COVID negative x 2  - On ceftriaxone, likely community-acquired  - Legionella negative, off azithromycin  - UA unremarkable  - Bcx: initial: Staph. epidermidis, 2nd: no growth, urine cx: no growth    #Heme/Onc  - Hgb 10.6 (baseline unknown)  - Trend CBC    #DVT Prophylaxis  - Xarelto given h/o multiple PE    #GOC  - DNR/DNI

## 2020-07-17 NOTE — DIETITIAN INITIAL EVALUATION ADULT. - OTHER INFO
Pt seen for: MICU Length Of Stay   Adm dx: AMS, respiratory failure    GI issues: none noted  Last BM: fecal incontinence 7/16    Food allergies/Intolerances: NKFA   Vit/supplement PTA: none noted    Diet PTA: unable to assess at this time      Subjective/Objective information: pt lethargic, confused, no visitors at bedside. No diet, wt hx available at this time. Has h/o DM A1c 6.4 this adm.    Education: Not indicated at this time

## 2020-07-17 NOTE — PROGRESS NOTE ADULT - SUBJECTIVE AND OBJECTIVE BOX
Nan Lockwood MD  PGY 1 Department of Internal Medicine  Pager: [phone]      Patient is a 80y old  Female who presents with a chief complaint of AMS (16 Jul 2020 07:22)      SUBJECTIVE / OVERNIGHT EVENTS: Pt seen and examined. No acute overnight events.   Denies fevers, chills, CP/palpitations, SOB/cough, abdominal pain, N/V, constipation, or diarrhea.        MEDICATIONS  (STANDING):  cefTRIAXone   IVPB 1000 milliGRAM(s) IV Intermittent every 24 hours  chlorhexidine 4% Liquid 1 Application(s) Topical <User Schedule>  dexMEDEtomidine Infusion 0.2 MICROgram(s)/kG/Hr (4.5 mL/Hr) IV Continuous <Continuous>  dextrose 5%. 1000 milliLiter(s) (50 mL/Hr) IV Continuous <Continuous>  dextrose 50% Injectable 12.5 Gram(s) IV Push once  dextrose 50% Injectable 25 Gram(s) IV Push once  dextrose 50% Injectable 25 Gram(s) IV Push once  insulin lispro (HumaLOG) corrective regimen sliding scale   SubCutaneous every 6 hours  norepinephrine Infusion 0.05 MICROgram(s)/kG/Min (7.69 mL/Hr) IV Continuous <Continuous>  pantoprazole  Injectable 40 milliGRAM(s) IV Push daily  QUEtiapine 25 milliGRAM(s) Oral two times a day  rivaroxaban 20 milliGRAM(s) Oral daily    MEDICATIONS  (PRN):  dextrose 40% Gel 15 Gram(s) Oral once PRN Blood Glucose LESS THAN 70 milliGRAM(s)/deciLiter  glucagon  Injectable 1 milliGRAM(s) IntraMuscular once PRN Glucose <70 milliGRAM(s)/deciLiter  haloperidol    Injectable 1 milliGRAM(s) IV Push every 4 hours PRN agitation      I&O's Summary    16 Jul 2020 07:01  -  17 Jul 2020 07:00  --------------------------------------------------------  IN: 738.7 mL / OUT: 2755 mL / NET: -2016.3 mL        Vital Signs Last 24 Hrs  T(C): 36 (17 Jul 2020 04:00), Max: 43 (16 Jul 2020 10:30)  T(F): 96.8 (17 Jul 2020 04:00), Max: 109.4 (16 Jul 2020 10:30)  HR: 69 (17 Jul 2020 07:00) (54 - 76)  BP: 125/53 (17 Jul 2020 07:00) (90/38 - 154/60)  BP(mean): 73 (17 Jul 2020 07:00) (55 - 87)  RR: 24 (17 Jul 2020 07:00) (16 - 34)  SpO2: 100% (17 Jul 2020 07:00) (86% - 100%)    CAPILLARY BLOOD GLUCOSE      POCT Blood Glucose.: 126 mg/dL (17 Jul 2020 05:06)  POCT Blood Glucose.: 117 mg/dL (16 Jul 2020 18:12)  POCT Blood Glucose.: 137 mg/dL (16 Jul 2020 11:15)      PHYSICAL EXAM:  GENERAL: NAD,   HEAD:  Atraumatic, Normocephalic  EYES: EOMI, PERRL, conjunctiva and sclera clear  NECK: No JVD  CHEST/LUNG: Clear to auscultation bilaterally; No wheeze  HEART: Regular rate and rhythm; No murmurs, rubs, or gallops  ABDOMEN: Soft, Nontender, Nondistended; Bowel sounds present  EXTREMITIES:  2+ Peripheral Pulses, No clubbing, cyanosis, or edema  PSYCH: AAOx3  NEUROLOGY: non-focal  SKIN: No rashes or lesions       LABS:                        10.6   11.35 )-----------( 272      ( 17 Jul 2020 00:27 )             36.7     Auto Eosinophil # 0.31  / Auto Eosinophil % 2.7   / Auto Neutrophil # 9.10  / Auto Neutrophil % 80.2  / BANDS % x                            9.6    10.29 )-----------( 245      ( 16 Jul 2020 00:37 )             33.0     Auto Eosinophil # 0.29  / Auto Eosinophil % 2.8   / Auto Neutrophil # 8.53  / Auto Neutrophil % 82.9  / BANDS % x                            9.6    9.90  )-----------( 243      ( 15 Jul 2020 16:52 )             33.3     Auto Eosinophil # 0.32  / Auto Eosinophil % 3.2   / Auto Neutrophil # 7.68  / Auto Neutrophil % 77.6  / BANDS % x        07-17    144  |  102  |  27<H>  ----------------------------<  131<H>  3.4<L>   |  24  |  1.06  07-16    144  |  105  |  33<H>  ----------------------------<  124<H>  3.7   |  26  |  1.16  07-15    143  |  104  |  34<H>  ----------------------------<  103<H>  3.8   |  24  |  1.19    Ca    8.8      17 Jul 2020 00:27  Mg     1.4     07-17  Phos  4.3     07-17  TPro  6.6  /  Alb  3.0<L>  /  TBili  0.2  /  DBili  x   /  AST  24  /  ALT  14  /  AlkPhos  94  07-17  TPro  5.6<L>  /  Alb  2.8<L>  /  TBili  0.2  /  DBili  x   /  AST  13  /  ALT  10  /  AlkPhos  83  07-16  TPro  5.9<L>  /  Alb  2.7<L>  /  TBili  0.1<L>  /  DBili  x   /  AST  18  /  ALT  13  /  AlkPhos  80  07-15    PT/INR - ( 17 Jul 2020 00:28 )   PT: 22.4 sec;   INR: 1.94 ratio         PTT - ( 17 Jul 2020 00:28 )  PTT:40.3 sec Nan Lockwood MD  PGY 1 Department of Internal Medicine  Pager: [phone]      Patient is a 80y old  Female who presents with a chief complaint of AMS (16 Jul 2020 07:22)      SUBJECTIVE / OVERNIGHT EVENTS: Pt seen and examined. No acute overnight events. Was started on seroquel, her home medication. F/u with blood cx for MRSA. Mag dropped to 1.4 and phosph to , both were repleted. She passed speech and swallow, can drink. Off BiPAP now on 2L of NC. Titrating off precedex.  ROS can't be assessed.        MEDICATIONS  (STANDING):  cefTRIAXone   IVPB 1000 milliGRAM(s) IV Intermittent every 24 hours  chlorhexidine 4% Liquid 1 Application(s) Topical <User Schedule>  dexMEDEtomidine Infusion 0.2 MICROgram(s)/kG/Hr (4.5 mL/Hr) IV Continuous <Continuous>  dextrose 5%. 1000 milliLiter(s) (50 mL/Hr) IV Continuous <Continuous>  dextrose 50% Injectable 12.5 Gram(s) IV Push once  dextrose 50% Injectable 25 Gram(s) IV Push once  dextrose 50% Injectable 25 Gram(s) IV Push once  insulin lispro (HumaLOG) corrective regimen sliding scale   SubCutaneous every 6 hours  norepinephrine Infusion 0.05 MICROgram(s)/kG/Min (7.69 mL/Hr) IV Continuous <Continuous>  pantoprazole  Injectable 40 milliGRAM(s) IV Push daily  QUEtiapine 25 milliGRAM(s) Oral two times a day  rivaroxaban 20 milliGRAM(s) Oral daily    MEDICATIONS  (PRN):  dextrose 40% Gel 15 Gram(s) Oral once PRN Blood Glucose LESS THAN 70 milliGRAM(s)/deciLiter  glucagon  Injectable 1 milliGRAM(s) IntraMuscular once PRN Glucose <70 milliGRAM(s)/deciLiter  haloperidol    Injectable 1 milliGRAM(s) IV Push every 4 hours PRN agitation      I&O's Summary    16 Jul 2020 07:01  -  17 Jul 2020 07:00  --------------------------------------------------------  IN: 738.7 mL / OUT: 2755 mL / NET: -2016.3 mL        Vital Signs Last 24 Hrs  T(C): 36 (17 Jul 2020 04:00), Max: 43 (16 Jul 2020 10:30)  T(F): 96.8 (17 Jul 2020 04:00), Max: 109.4 (16 Jul 2020 10:30)  HR: 69 (17 Jul 2020 07:00) (54 - 76)  BP: 125/53 (17 Jul 2020 07:00) (90/38 - 154/60)  BP(mean): 73 (17 Jul 2020 07:00) (55 - 87)  RR: 24 (17 Jul 2020 07:00) (16 - 34)  SpO2: 100% (17 Jul 2020 07:00) (86% - 100%)    CAPILLARY BLOOD GLUCOSE      POCT Blood Glucose.: 126 mg/dL (17 Jul 2020 05:06)  POCT Blood Glucose.: 117 mg/dL (16 Jul 2020 18:12)  POCT Blood Glucose.: 137 mg/dL (16 Jul 2020 11:15)      PHYSICAL EXAM:  GENERAL: NAD,   HEAD:  Atraumatic, Normocephalic  EYES: EOMI, PERRL, conjunctiva and sclera clear  NECK: No JVD  CHEST/LUNG: Clear to auscultation bilaterally; No wheeze  HEART: Regular rate and rhythm; No murmurs, rubs, or gallops  ABDOMEN: Soft, Nontender, Nondistended; Bowel sounds present  EXTREMITIES:  2+ Peripheral Pulses, No clubbing, cyanosis, or edema  PSYCH: AAOx3  NEUROLOGY: non-focal  SKIN: No rashes or lesions       LABS:                        10.6   11.35 )-----------( 272      ( 17 Jul 2020 00:27 )             36.7     Auto Eosinophil # 0.31  / Auto Eosinophil % 2.7   / Auto Neutrophil # 9.10  / Auto Neutrophil % 80.2  / BANDS % x                            9.6    10.29 )-----------( 245      ( 16 Jul 2020 00:37 )             33.0     Auto Eosinophil # 0.29  / Auto Eosinophil % 2.8   / Auto Neutrophil # 8.53  / Auto Neutrophil % 82.9  / BANDS % x                            9.6    9.90  )-----------( 243      ( 15 Jul 2020 16:52 )             33.3     Auto Eosinophil # 0.32  / Auto Eosinophil % 3.2   / Auto Neutrophil # 7.68  / Auto Neutrophil % 77.6  / BANDS % x        07-17    144  |  102  |  27<H>  ----------------------------<  131<H>  3.4<L>   |  24  |  1.06  07-16    144  |  105  |  33<H>  ----------------------------<  124<H>  3.7   |  26  |  1.16  07-15    143  |  104  |  34<H>  ----------------------------<  103<H>  3.8   |  24  |  1.19    Ca    8.8      17 Jul 2020 00:27  Mg     1.4     07-17  Phos  4.3     07-17  TPro  6.6  /  Alb  3.0<L>  /  TBili  0.2  /  DBili  x   /  AST  24  /  ALT  14  /  AlkPhos  94  07-17  TPro  5.6<L>  /  Alb  2.8<L>  /  TBili  0.2  /  DBili  x   /  AST  13  /  ALT  10  /  AlkPhos  83  07-16  TPro  5.9<L>  /  Alb  2.7<L>  /  TBili  0.1<L>  /  DBili  x   /  AST  18  /  ALT  13  /  AlkPhos  80  07-15    PT/INR - ( 17 Jul 2020 00:28 )   PT: 22.4 sec;   INR: 1.94 ratio         PTT - ( 17 Jul 2020 00:28 )  PTT:40.3 sec Nan Lockwood MD  PGY 1 Department of Internal Medicine  Pager: [phone]      Patient is a 80y old  Female who presents with a chief complaint of AMS (16 Jul 2020 07:22)      SUBJECTIVE / OVERNIGHT EVENTS: Pt seen and examined. No acute overnight events. Was started on seroquel, her home medication. F/u with blood cx for MRSA. Mag dropped to 1.4 and phosph to , both were repleted. She passed speech and swallow, can drink. Off BiPAP now on 2L of NC saturating well.   ROS can't be assessed.        MEDICATIONS  (STANDING):  cefTRIAXone   IVPB 1000 milliGRAM(s) IV Intermittent every 24 hours  chlorhexidine 4% Liquid 1 Application(s) Topical <User Schedule>  dexMEDEtomidine Infusion 0.2 MICROgram(s)/kG/Hr (4.5 mL/Hr) IV Continuous <Continuous>  dextrose 5%. 1000 milliLiter(s) (50 mL/Hr) IV Continuous <Continuous>  dextrose 50% Injectable 12.5 Gram(s) IV Push once  dextrose 50% Injectable 25 Gram(s) IV Push once  dextrose 50% Injectable 25 Gram(s) IV Push once  insulin lispro (HumaLOG) corrective regimen sliding scale   SubCutaneous every 6 hours  norepinephrine Infusion 0.05 MICROgram(s)/kG/Min (7.69 mL/Hr) IV Continuous <Continuous>  pantoprazole  Injectable 40 milliGRAM(s) IV Push daily  QUEtiapine 25 milliGRAM(s) Oral two times a day  rivaroxaban 20 milliGRAM(s) Oral daily    MEDICATIONS  (PRN):  dextrose 40% Gel 15 Gram(s) Oral once PRN Blood Glucose LESS THAN 70 milliGRAM(s)/deciLiter  glucagon  Injectable 1 milliGRAM(s) IntraMuscular once PRN Glucose <70 milliGRAM(s)/deciLiter  haloperidol    Injectable 1 milliGRAM(s) IV Push every 4 hours PRN agitation      I&O's Summary    16 Jul 2020 07:01  -  17 Jul 2020 07:00  --------------------------------------------------------  IN: 738.7 mL / OUT: 2755 mL / NET: -2016.3 mL        Vital Signs Last 24 Hrs  T(C): 36 (17 Jul 2020 04:00), Max: 43 (16 Jul 2020 10:30)  T(F): 96.8 (17 Jul 2020 04:00), Max: 109.4 (16 Jul 2020 10:30)  HR: 69 (17 Jul 2020 07:00) (54 - 76)  BP: 125/53 (17 Jul 2020 07:00) (90/38 - 154/60)  BP(mean): 73 (17 Jul 2020 07:00) (55 - 87)  RR: 24 (17 Jul 2020 07:00) (16 - 34)  SpO2: 100% (17 Jul 2020 07:00) (86% - 100%)    CAPILLARY BLOOD GLUCOSE      POCT Blood Glucose.: 126 mg/dL (17 Jul 2020 05:06)  POCT Blood Glucose.: 117 mg/dL (16 Jul 2020 18:12)  POCT Blood Glucose.: 137 mg/dL (16 Jul 2020 11:15)      PHYSICAL EXAM:  GENERAL: NAD,   HEAD:  Atraumatic, Normocephalic  EYES: EOMI, PERRL, conjunctiva and sclera clear  NECK: No JVD  CHEST/LUNG: Clear to auscultation bilaterally; No wheeze  HEART: Regular rate and rhythm; No murmurs, rubs, or gallops  ABDOMEN: Soft, Nontender, Nondistended; Bowel sounds present  EXTREMITIES:  2+ Peripheral Pulses, No clubbing, cyanosis, or edema  PSYCH: AAOx3  NEUROLOGY: non-focal  SKIN: No rashes or lesions       LABS:                        10.6   11.35 )-----------( 272      ( 17 Jul 2020 00:27 )             36.7     Auto Eosinophil # 0.31  / Auto Eosinophil % 2.7   / Auto Neutrophil # 9.10  / Auto Neutrophil % 80.2  / BANDS % x                            9.6    10.29 )-----------( 245      ( 16 Jul 2020 00:37 )             33.0     Auto Eosinophil # 0.29  / Auto Eosinophil % 2.8   / Auto Neutrophil # 8.53  / Auto Neutrophil % 82.9  / BANDS % x                            9.6    9.90  )-----------( 243      ( 15 Jul 2020 16:52 )             33.3     Auto Eosinophil # 0.32  / Auto Eosinophil % 3.2   / Auto Neutrophil # 7.68  / Auto Neutrophil % 77.6  / BANDS % x        07-17    144  |  102  |  27<H>  ----------------------------<  131<H>  3.4<L>   |  24  |  1.06  07-16    144  |  105  |  33<H>  ----------------------------<  124<H>  3.7   |  26  |  1.16  07-15    143  |  104  |  34<H>  ----------------------------<  103<H>  3.8   |  24  |  1.19    Ca    8.8      17 Jul 2020 00:27  Mg     1.4     07-17  Phos  4.3     07-17  TPro  6.6  /  Alb  3.0<L>  /  TBili  0.2  /  DBili  x   /  AST  24  /  ALT  14  /  AlkPhos  94  07-17  TPro  5.6<L>  /  Alb  2.8<L>  /  TBili  0.2  /  DBili  x   /  AST  13  /  ALT  10  /  AlkPhos  83  07-16  TPro  5.9<L>  /  Alb  2.7<L>  /  TBili  0.1<L>  /  DBili  x   /  AST  18  /  ALT  13  /  AlkPhos  80  07-15    PT/INR - ( 17 Jul 2020 00:28 )   PT: 22.4 sec;   INR: 1.94 ratio         PTT - ( 17 Jul 2020 00:28 )  PTT:40.3 sec

## 2020-07-17 NOTE — PROGRESS NOTE ADULT - ATTENDING COMMENTS
80 F with history of COPD, DM2, CHF, schizoaffective disorder presents with AMS in setting of acute (possibly on chronic) hypoxemic / hypercapneic respiratory, heart failure exacerbation. Possible underlying pneumonia.    1. Acute hypoxemic and hypercapneic respiratory failure - ABG improving. Tolerating nasal cannula 2L now. Can use BiPAP at night as she remains hypercapnic.  2. Persistent shock state on low dose vasopressors - distributive, vasoplegic. Slowly improving. Start midodrine.  3. Acute on chronic heart failure exacerbation. EF 25% on official TTE. No evidence of active ishchemia. Diurese.  4. Community acquired pneumonia - ceftriaxone. Legionella negative. Plan for 7 day course.  5. C1 L anterior arch and left posterior arch fracture - neurosurgery recommendations appreciated - continue C-collar. MRI when stable.  6. Goals of care - DNR/DNI    Prognosis guarded.

## 2020-07-18 LAB
ANION GAP SERPL CALC-SCNC: 17 MMOL/L — SIGNIFICANT CHANGE UP (ref 5–17)
APTT BLD: 38.2 SEC — HIGH (ref 27.5–35.5)
BASE EXCESS BLDV CALC-SCNC: 4.3 MMOL/L — HIGH (ref -2–2)
BASOPHILS # BLD AUTO: 0.07 K/UL — SIGNIFICANT CHANGE UP (ref 0–0.2)
BASOPHILS NFR BLD AUTO: 0.8 % — SIGNIFICANT CHANGE UP (ref 0–2)
BUN SERPL-MCNC: 27 MG/DL — HIGH (ref 7–23)
CA-I SERPL-SCNC: 1.27 MMOL/L — SIGNIFICANT CHANGE UP (ref 1.12–1.3)
CALCIUM SERPL-MCNC: 9.1 MG/DL — SIGNIFICANT CHANGE UP (ref 8.4–10.5)
CHLORIDE BLDV-SCNC: 107 MMOL/L — SIGNIFICANT CHANGE UP (ref 96–108)
CHLORIDE SERPL-SCNC: 104 MMOL/L — SIGNIFICANT CHANGE UP (ref 96–108)
CO2 BLDV-SCNC: 37 MMOL/L — HIGH (ref 22–30)
CO2 SERPL-SCNC: 28 MMOL/L — SIGNIFICANT CHANGE UP (ref 22–31)
CREAT SERPL-MCNC: 1.23 MG/DL — SIGNIFICANT CHANGE UP (ref 0.5–1.3)
EOSINOPHIL # BLD AUTO: 0.21 K/UL — SIGNIFICANT CHANGE UP (ref 0–0.5)
EOSINOPHIL NFR BLD AUTO: 2.5 % — SIGNIFICANT CHANGE UP (ref 0–6)
GAS PNL BLDV: 150 MMOL/L — HIGH (ref 135–145)
GAS PNL BLDV: SIGNIFICANT CHANGE UP
GLUCOSE BLDC GLUCOMTR-MCNC: 100 MG/DL — HIGH (ref 70–99)
GLUCOSE BLDC GLUCOMTR-MCNC: 80 MG/DL — SIGNIFICANT CHANGE UP (ref 70–99)
GLUCOSE BLDC GLUCOMTR-MCNC: 98 MG/DL — SIGNIFICANT CHANGE UP (ref 70–99)
GLUCOSE BLDV-MCNC: 92 MG/DL — SIGNIFICANT CHANGE UP (ref 70–99)
GLUCOSE SERPL-MCNC: 94 MG/DL — SIGNIFICANT CHANGE UP (ref 70–99)
HCO3 BLDV-SCNC: 34 MMOL/L — HIGH (ref 21–29)
HCT VFR BLD CALC: 33.9 % — LOW (ref 34.5–45)
HCT VFR BLDA CALC: 34 % — LOW (ref 39–50)
HGB BLD CALC-MCNC: 10.9 G/DL — LOW (ref 11.5–15.5)
HGB BLD-MCNC: 9.8 G/DL — LOW (ref 11.5–15.5)
IMM GRANULOCYTES NFR BLD AUTO: 0.2 % — SIGNIFICANT CHANGE UP (ref 0–1.5)
INR BLD: 1.78 RATIO — HIGH (ref 0.88–1.16)
LACTATE BLDV-MCNC: 0.8 MMOL/L — SIGNIFICANT CHANGE UP (ref 0.7–2)
LYMPHOCYTES # BLD AUTO: 0.72 K/UL — LOW (ref 1–3.3)
LYMPHOCYTES # BLD AUTO: 8.7 % — LOW (ref 13–44)
MAGNESIUM SERPL-MCNC: 1.8 MG/DL — SIGNIFICANT CHANGE UP (ref 1.6–2.6)
MCHC RBC-ENTMCNC: 24.6 PG — LOW (ref 27–34)
MCHC RBC-ENTMCNC: 28.9 GM/DL — LOW (ref 32–36)
MCV RBC AUTO: 85 FL — SIGNIFICANT CHANGE UP (ref 80–100)
MONOCYTES # BLD AUTO: 0.66 K/UL — SIGNIFICANT CHANGE UP (ref 0–0.9)
MONOCYTES NFR BLD AUTO: 7.9 % — SIGNIFICANT CHANGE UP (ref 2–14)
NEUTROPHILS # BLD AUTO: 6.63 K/UL — SIGNIFICANT CHANGE UP (ref 1.8–7.4)
NEUTROPHILS NFR BLD AUTO: 79.9 % — HIGH (ref 43–77)
NRBC # BLD: 0 /100 WBCS — SIGNIFICANT CHANGE UP (ref 0–0)
OTHER CELLS CSF MANUAL: 12 ML/DL — LOW (ref 18–22)
PCO2 BLDV: 91 MMHG — HIGH (ref 35–50)
PH BLDV: 7.2 — LOW (ref 7.35–7.45)
PHOSPHATE SERPL-MCNC: 4.7 MG/DL — HIGH (ref 2.5–4.5)
PLATELET # BLD AUTO: 233 K/UL — SIGNIFICANT CHANGE UP (ref 150–400)
PO2 BLDV: 52 MMHG — HIGH (ref 25–45)
POTASSIUM BLDV-SCNC: 3.5 MMOL/L — SIGNIFICANT CHANGE UP (ref 3.5–5.3)
POTASSIUM SERPL-MCNC: 3.5 MMOL/L — SIGNIFICANT CHANGE UP (ref 3.5–5.3)
POTASSIUM SERPL-SCNC: 3.5 MMOL/L — SIGNIFICANT CHANGE UP (ref 3.5–5.3)
PROTHROM AB SERPL-ACNC: 20.6 SEC — HIGH (ref 10.6–13.6)
RBC # BLD: 3.99 M/UL — SIGNIFICANT CHANGE UP (ref 3.8–5.2)
RBC # FLD: 16.2 % — HIGH (ref 10.3–14.5)
SAO2 % BLDV: 82 % — SIGNIFICANT CHANGE UP (ref 67–88)
SODIUM SERPL-SCNC: 149 MMOL/L — HIGH (ref 135–145)
WBC # BLD: 8.31 K/UL — SIGNIFICANT CHANGE UP (ref 3.8–10.5)
WBC # FLD AUTO: 8.31 K/UL — SIGNIFICANT CHANGE UP (ref 3.8–10.5)

## 2020-07-18 PROCEDURE — 71045 X-RAY EXAM CHEST 1 VIEW: CPT | Mod: 26

## 2020-07-18 PROCEDURE — 99291 CRITICAL CARE FIRST HOUR: CPT

## 2020-07-18 RX ORDER — SODIUM CHLORIDE 9 MG/ML
500 INJECTION, SOLUTION INTRAVENOUS ONCE
Refills: 0 | Status: COMPLETED | OUTPATIENT
Start: 2020-07-18 | End: 2020-07-18

## 2020-07-18 RX ADMIN — CEFTRIAXONE 100 MILLIGRAM(S): 500 INJECTION, POWDER, FOR SOLUTION INTRAMUSCULAR; INTRAVENOUS at 08:00

## 2020-07-18 RX ADMIN — PANTOPRAZOLE SODIUM 40 MILLIGRAM(S): 20 TABLET, DELAYED RELEASE ORAL at 11:14

## 2020-07-18 RX ADMIN — MIDODRINE HYDROCHLORIDE 20 MILLIGRAM(S): 2.5 TABLET ORAL at 03:36

## 2020-07-18 RX ADMIN — SODIUM CHLORIDE 3000 MILLILITER(S): 9 INJECTION, SOLUTION INTRAVENOUS at 18:02

## 2020-07-18 RX ADMIN — MIDODRINE HYDROCHLORIDE 20 MILLIGRAM(S): 2.5 TABLET ORAL at 11:14

## 2020-07-18 RX ADMIN — MIDODRINE HYDROCHLORIDE 20 MILLIGRAM(S): 2.5 TABLET ORAL at 18:02

## 2020-07-18 RX ADMIN — QUETIAPINE FUMARATE 25 MILLIGRAM(S): 200 TABLET, FILM COATED ORAL at 17:56

## 2020-07-18 RX ADMIN — CHLORHEXIDINE GLUCONATE 1 APPLICATION(S): 213 SOLUTION TOPICAL at 06:15

## 2020-07-18 RX ADMIN — QUETIAPINE FUMARATE 25 MILLIGRAM(S): 200 TABLET, FILM COATED ORAL at 06:15

## 2020-07-18 RX ADMIN — RIVAROXABAN 20 MILLIGRAM(S): KIT at 11:14

## 2020-07-18 NOTE — PROGRESS NOTE ADULT - SUBJECTIVE AND OBJECTIVE BOX
Nan Lockwood MD  PGY 1 Department of Internal Medicine  Pager: [phone]      Patient is a 80y old  Female who presents with a chief complaint of AMS (17 Jul 2020 07:24)      SUBJECTIVE / OVERNIGHT EVENTS: Pt seen and examined. No acute overnight events.   Denies fevers, chills, CP/palpitations, SOB/cough, abdominal pain, N/V, constipation, or diarrhea.        MEDICATIONS  (STANDING):  cefTRIAXone   IVPB 1000 milliGRAM(s) IV Intermittent every 24 hours  chlorhexidine 4% Liquid 1 Application(s) Topical <User Schedule>  dextrose 5%. 1000 milliLiter(s) (50 mL/Hr) IV Continuous <Continuous>  dextrose 50% Injectable 12.5 Gram(s) IV Push once  dextrose 50% Injectable 25 Gram(s) IV Push once  dextrose 50% Injectable 25 Gram(s) IV Push once  insulin lispro (HumaLOG) corrective regimen sliding scale   SubCutaneous every 6 hours  midodrine 20 milliGRAM(s) Oral every 8 hours  pantoprazole  Injectable 40 milliGRAM(s) IV Push daily  QUEtiapine 25 milliGRAM(s) Oral two times a day  rivaroxaban 20 milliGRAM(s) Oral daily    MEDICATIONS  (PRN):  dextrose 40% Gel 15 Gram(s) Oral once PRN Blood Glucose LESS THAN 70 milliGRAM(s)/deciLiter  glucagon  Injectable 1 milliGRAM(s) IntraMuscular once PRN Glucose <70 milliGRAM(s)/deciLiter  haloperidol    Injectable 1 milliGRAM(s) IV Push every 6 hours PRN agitation      I&O's Summary    17 Jul 2020 07:01  -  18 Jul 2020 07:00  --------------------------------------------------------  IN: 194.5 mL / OUT: 1415 mL / NET: -1220.5 mL        Vital Signs Last 24 Hrs  T(C): 36.6 (18 Jul 2020 04:00), Max: 37.2 (17 Jul 2020 13:00)  T(F): 97.9 (18 Jul 2020 04:00), Max: 99 (17 Jul 2020 13:00)  HR: 67 (18 Jul 2020 07:00) (61 - 97)  BP: 98/54 (18 Jul 2020 07:00) (78/38 - 131/52)  BP(mean): 73 (18 Jul 2020 07:00) (55 - 87)  RR: 21 (18 Jul 2020 07:00) (18 - 41)  SpO2: 100% (18 Jul 2020 07:00) (93% - 100%)    CAPILLARY BLOOD GLUCOSE      POCT Blood Glucose.: 100 mg/dL (18 Jul 2020 05:18)  POCT Blood Glucose.: 85 mg/dL (17 Jul 2020 23:26)  POCT Blood Glucose.: 102 mg/dL (17 Jul 2020 16:55)  POCT Blood Glucose.: 122 mg/dL (17 Jul 2020 11:36)      PHYSICAL EXAM:  GENERAL: NAD,   HEAD:  Atraumatic, Normocephalic  EYES: EOMI, PERRL, conjunctiva and sclera clear  NECK: No JVD  CHEST/LUNG: Clear to auscultation bilaterally; No wheeze  HEART: Regular rate and rhythm; No murmurs, rubs, or gallops  ABDOMEN: Soft, Nontender, Nondistended; Bowel sounds present  EXTREMITIES:  2+ Peripheral Pulses, No clubbing, cyanosis, or edema  PSYCH: AAOx3  NEUROLOGY: non-focal  SKIN: No rashes or lesions       LABS:                        9.8    8.31  )-----------( 233      ( 18 Jul 2020 00:48 )             33.9     Auto Eosinophil # 0.21  / Auto Eosinophil % 2.5   / Auto Neutrophil # 6.63  / Auto Neutrophil % 79.9  / BANDS % x                            10.6   11.35 )-----------( 272      ( 17 Jul 2020 00:27 )             36.7     Auto Eosinophil # 0.31  / Auto Eosinophil % 2.7   / Auto Neutrophil # 9.10  / Auto Neutrophil % 80.2  / BANDS % x        07-18    149<H>  |  104  |  27<H>  ----------------------------<  94  3.5   |  28  |  1.23  07-17    148<H>  |  102  |  25<H>  ----------------------------<  130<H>  3.8   |  24  |  1.00  07-17    144  |  102  |  27<H>  ----------------------------<  131<H>  3.4<L>   |  24  |  1.06    Ca    9.1      18 Jul 2020 00:48  Mg     1.8     07-18  Phos  4.7     07-18  TPro  6.6  /  Alb  3.0<L>  /  TBili  0.2  /  DBili  x   /  AST  24  /  ALT  14  /  AlkPhos  94  07-17    PT/INR - ( 18 Jul 2020 00:48 )   PT: 20.6 sec;   INR: 1.78 ratio         PTT - ( 18 Jul 2020 00:48 )  PTT:38.2 sec              RADIOLOGY & ADDITIONAL TESTS:    Imaging Personally Reviewed:    Consultant(s) Notes Reviewed:      Care Discussed with Consultants/Other Providers: Nan Lockwood MD  PGY 1 Department of Internal Medicine  Pager: [phone]      Patient is a 80y old  Female who presents with a chief complaint of AMS (17 Jul 2020 07:24)      SUBJECTIVE / OVERNIGHT EVENTS: Pt seen and examined. No acute overnight events.   Urine outuput: 15-20cc/hr at 11:30 pm.   Denies fevers, chills, CP/palpitations, SOB/cough, abdominal pain, N/V, constipation, or diarrhea.        MEDICATIONS  (STANDING):  cefTRIAXone   IVPB 1000 milliGRAM(s) IV Intermittent every 24 hours  chlorhexidine 4% Liquid 1 Application(s) Topical <User Schedule>  dextrose 5%. 1000 milliLiter(s) (50 mL/Hr) IV Continuous <Continuous>  dextrose 50% Injectable 12.5 Gram(s) IV Push once  dextrose 50% Injectable 25 Gram(s) IV Push once  dextrose 50% Injectable 25 Gram(s) IV Push once  insulin lispro (HumaLOG) corrective regimen sliding scale   SubCutaneous every 6 hours  midodrine 20 milliGRAM(s) Oral every 8 hours  pantoprazole  Injectable 40 milliGRAM(s) IV Push daily  QUEtiapine 25 milliGRAM(s) Oral two times a day  rivaroxaban 20 milliGRAM(s) Oral daily    MEDICATIONS  (PRN):  dextrose 40% Gel 15 Gram(s) Oral once PRN Blood Glucose LESS THAN 70 milliGRAM(s)/deciLiter  glucagon  Injectable 1 milliGRAM(s) IntraMuscular once PRN Glucose <70 milliGRAM(s)/deciLiter  haloperidol    Injectable 1 milliGRAM(s) IV Push every 6 hours PRN agitation      I&O's Summary    17 Jul 2020 07:01  -  18 Jul 2020 07:00  --------------------------------------------------------  IN: 194.5 mL / OUT: 1415 mL / NET: -1220.5 mL        Vital Signs Last 24 Hrs  T(C): 36.6 (18 Jul 2020 04:00), Max: 37.2 (17 Jul 2020 13:00)  T(F): 97.9 (18 Jul 2020 04:00), Max: 99 (17 Jul 2020 13:00)  HR: 67 (18 Jul 2020 07:00) (61 - 97)  BP: 98/54 (18 Jul 2020 07:00) (78/38 - 131/52)  BP(mean): 73 (18 Jul 2020 07:00) (55 - 87)  RR: 21 (18 Jul 2020 07:00) (18 - 41)  SpO2: 100% (18 Jul 2020 07:00) (93% - 100%)    CAPILLARY BLOOD GLUCOSE      POCT Blood Glucose.: 100 mg/dL (18 Jul 2020 05:18)  POCT Blood Glucose.: 85 mg/dL (17 Jul 2020 23:26)  POCT Blood Glucose.: 102 mg/dL (17 Jul 2020 16:55)  POCT Blood Glucose.: 122 mg/dL (17 Jul 2020 11:36)      PHYSICAL EXAM:  GENERAL: NAD,   HEAD:  Atraumatic, Normocephalic  EYES: EOMI, PERRL, conjunctiva and sclera clear  NECK: No JVD  CHEST/LUNG: Clear to auscultation bilaterally; No wheeze  HEART: Regular rate and rhythm; No murmurs, rubs, or gallops  ABDOMEN: Soft, Nontender, Nondistended; Bowel sounds present  EXTREMITIES:  2+ Peripheral Pulses, No clubbing, cyanosis, or edema  PSYCH: AAOx3  NEUROLOGY: non-focal  SKIN: No rashes or lesions       LABS:                        9.8    8.31  )-----------( 233      ( 18 Jul 2020 00:48 )             33.9     Auto Eosinophil # 0.21  / Auto Eosinophil % 2.5   / Auto Neutrophil # 6.63  / Auto Neutrophil % 79.9  / BANDS % x                            10.6   11.35 )-----------( 272      ( 17 Jul 2020 00:27 )             36.7     Auto Eosinophil # 0.31  / Auto Eosinophil % 2.7   / Auto Neutrophil # 9.10  / Auto Neutrophil % 80.2  / BANDS % x        07-18    149<H>  |  104  |  27<H>  ----------------------------<  94  3.5   |  28  |  1.23  07-17    148<H>  |  102  |  25<H>  ----------------------------<  130<H>  3.8   |  24  |  1.00  07-17    144  |  102  |  27<H>  ----------------------------<  131<H>  3.4<L>   |  24  |  1.06    Ca    9.1      18 Jul 2020 00:48  Mg     1.8     07-18  Phos  4.7     07-18  TPro  6.6  /  Alb  3.0<L>  /  TBili  0.2  /  DBili  x   /  AST  24  /  ALT  14  /  AlkPhos  94  07-17    PT/INR - ( 18 Jul 2020 00:48 )   PT: 20.6 sec;   INR: 1.78 ratio         PTT - ( 18 Jul 2020 00:48 )  PTT:38.2 sec              RADIOLOGY & ADDITIONAL TESTS:    Imaging Personally Reviewed:    Consultant(s) Notes Reviewed:      Care Discussed with Consultants/Other Providers: Nan Lockwood MD  PGY 1 Department of Internal Medicine  Pager: [phone]      Patient is a 80y old  Female who presents with a chief complaint of AMS (17 Jul 2020 07:24)      SUBJECTIVE / OVERNIGHT EVENTS: Pt seen and examined. No acute overnight events.   Denies fevers, chills, CP/palpitations, SOB/cough, abdominal pain, N/V, constipation, or diarrhea.        MEDICATIONS  (STANDING):  cefTRIAXone   IVPB 1000 milliGRAM(s) IV Intermittent every 24 hours  chlorhexidine 4% Liquid 1 Application(s) Topical <User Schedule>  dextrose 5%. 1000 milliLiter(s) (50 mL/Hr) IV Continuous <Continuous>  dextrose 50% Injectable 12.5 Gram(s) IV Push once  dextrose 50% Injectable 25 Gram(s) IV Push once  dextrose 50% Injectable 25 Gram(s) IV Push once  insulin lispro (HumaLOG) corrective regimen sliding scale   SubCutaneous every 6 hours  midodrine 20 milliGRAM(s) Oral every 8 hours  pantoprazole  Injectable 40 milliGRAM(s) IV Push daily  QUEtiapine 25 milliGRAM(s) Oral two times a day  rivaroxaban 20 milliGRAM(s) Oral daily    MEDICATIONS  (PRN):  dextrose 40% Gel 15 Gram(s) Oral once PRN Blood Glucose LESS THAN 70 milliGRAM(s)/deciLiter  glucagon  Injectable 1 milliGRAM(s) IntraMuscular once PRN Glucose <70 milliGRAM(s)/deciLiter  haloperidol    Injectable 1 milliGRAM(s) IV Push every 6 hours PRN agitation      I&O's Summary    17 Jul 2020 07:01  -  18 Jul 2020 07:00  --------------------------------------------------------  IN: 194.5 mL / OUT: 1415 mL / NET: -1220.5 mL        Vital Signs Last 24 Hrs  T(C): 36.6 (18 Jul 2020 04:00), Max: 37.2 (17 Jul 2020 13:00)  T(F): 97.9 (18 Jul 2020 04:00), Max: 99 (17 Jul 2020 13:00)  HR: 67 (18 Jul 2020 07:00) (61 - 97)  BP: 98/54 (18 Jul 2020 07:00) (78/38 - 131/52)  BP(mean): 73 (18 Jul 2020 07:00) (55 - 87)  RR: 21 (18 Jul 2020 07:00) (18 - 41)  SpO2: 100% (18 Jul 2020 07:00) (93% - 100%)    CAPILLARY BLOOD GLUCOSE      POCT Blood Glucose.: 100 mg/dL (18 Jul 2020 05:18)  POCT Blood Glucose.: 85 mg/dL (17 Jul 2020 23:26)  POCT Blood Glucose.: 102 mg/dL (17 Jul 2020 16:55)  POCT Blood Glucose.: 122 mg/dL (17 Jul 2020 11:36)      PHYSICAL EXAM:  GENERAL: NAD,   HEAD:  Atraumatic, Normocephalic  EYES: EOMI, PERRL, conjunctiva and sclera clear  NECK: No JVD  CHEST/LUNG: Clear to auscultation bilaterally; No wheeze  HEART: Regular rate and rhythm; No murmurs, rubs, or gallops  ABDOMEN: Soft, Nontender, Nondistended; Bowel sounds present  EXTREMITIES:  2+ Peripheral Pulses, No clubbing, cyanosis, or edema  PSYCH: AAOx3  NEUROLOGY: non-focal  SKIN: No rashes or lesions       LABS:                        9.8    8.31  )-----------( 233      ( 18 Jul 2020 00:48 )             33.9     Auto Eosinophil # 0.21  / Auto Eosinophil % 2.5   / Auto Neutrophil # 6.63  / Auto Neutrophil % 79.9  / BANDS % x                            10.6   11.35 )-----------( 272      ( 17 Jul 2020 00:27 )             36.7     Auto Eosinophil # 0.31  / Auto Eosinophil % 2.7   / Auto Neutrophil # 9.10  / Auto Neutrophil % 80.2  / BANDS % x        07-18    149<H>  |  104  |  27<H>  ----------------------------<  94  3.5   |  28  |  1.23  07-17    148<H>  |  102  |  25<H>  ----------------------------<  130<H>  3.8   |  24  |  1.00  07-17    144  |  102  |  27<H>  ----------------------------<  131<H>  3.4<L>   |  24  |  1.06    Ca    9.1      18 Jul 2020 00:48  Mg     1.8     07-18  Phos  4.7     07-18  TPro  6.6  /  Alb  3.0<L>  /  TBili  0.2  /  DBili  x   /  AST  24  /  ALT  14  /  AlkPhos  94  07-17    PT/INR - ( 18 Jul 2020 00:48 )   PT: 20.6 sec;   INR: 1.78 ratio         PTT - ( 18 Jul 2020 00:48 )  PTT:38.2 sec      RADIOLOGY & ADDITIONAL TESTS:    Imaging Personally Reviewed:    Consultant(s) Notes Reviewed:      Care Discussed with Consultants/Other Providers: Nan Lockwood MD  PGY 1 Department of Internal Medicine  Pager: [phone]      Patient is a 80y old  Female who presents with a chief complaint of AMS (17 Jul 2020 07:24)      SUBJECTIVE / OVERNIGHT EVENTS: Pt seen and examined. No acute overnight events.   ROS limited due to altered mental status.        MEDICATIONS  (STANDING):  cefTRIAXone   IVPB 1000 milliGRAM(s) IV Intermittent every 24 hours  chlorhexidine 4% Liquid 1 Application(s) Topical <User Schedule>  dextrose 5%. 1000 milliLiter(s) (50 mL/Hr) IV Continuous <Continuous>  dextrose 50% Injectable 12.5 Gram(s) IV Push once  dextrose 50% Injectable 25 Gram(s) IV Push once  dextrose 50% Injectable 25 Gram(s) IV Push once  insulin lispro (HumaLOG) corrective regimen sliding scale   SubCutaneous every 6 hours  midodrine 20 milliGRAM(s) Oral every 8 hours  pantoprazole  Injectable 40 milliGRAM(s) IV Push daily  QUEtiapine 25 milliGRAM(s) Oral two times a day  rivaroxaban 20 milliGRAM(s) Oral daily    MEDICATIONS  (PRN):  dextrose 40% Gel 15 Gram(s) Oral once PRN Blood Glucose LESS THAN 70 milliGRAM(s)/deciLiter  glucagon  Injectable 1 milliGRAM(s) IntraMuscular once PRN Glucose <70 milliGRAM(s)/deciLiter  haloperidol    Injectable 1 milliGRAM(s) IV Push every 6 hours PRN agitation      I&O's Summary    17 Jul 2020 07:01  -  18 Jul 2020 07:00  --------------------------------------------------------  IN: 194.5 mL / OUT: 1415 mL / NET: -1220.5 mL        Vital Signs Last 24 Hrs  T(C): 36.6 (18 Jul 2020 04:00), Max: 37.2 (17 Jul 2020 13:00)  T(F): 97.9 (18 Jul 2020 04:00), Max: 99 (17 Jul 2020 13:00)  HR: 67 (18 Jul 2020 07:00) (61 - 97)  BP: 98/54 (18 Jul 2020 07:00) (78/38 - 131/52)  BP(mean): 73 (18 Jul 2020 07:00) (55 - 87)  RR: 21 (18 Jul 2020 07:00) (18 - 41)  SpO2: 100% (18 Jul 2020 07:00) (93% - 100%)    CAPILLARY BLOOD GLUCOSE      POCT Blood Glucose.: 100 mg/dL (18 Jul 2020 05:18)  POCT Blood Glucose.: 85 mg/dL (17 Jul 2020 23:26)  POCT Blood Glucose.: 102 mg/dL (17 Jul 2020 16:55)  POCT Blood Glucose.: 122 mg/dL (17 Jul 2020 11:36)      PHYSICAL EXAM:  GENERAL: NAD,   HEAD:  Atraumatic, Normocephalic  EYES: EOMI, PERRL, conjunctiva and sclera clear  NECK: No JVD  CHEST/LUNG: Clear to auscultation bilaterally; No wheeze  HEART: Regular rate and rhythm; No murmurs, rubs, or gallops  ABDOMEN: Soft, Nontender, Nondistended; Bowel sounds present  EXTREMITIES:  2+ Peripheral Pulses, No clubbing, cyanosis, or edema  PSYCH: AAOx3  NEUROLOGY: non-focal  SKIN: No rashes or lesions       LABS:                        9.8    8.31  )-----------( 233      ( 18 Jul 2020 00:48 )             33.9     Auto Eosinophil # 0.21  / Auto Eosinophil % 2.5   / Auto Neutrophil # 6.63  / Auto Neutrophil % 79.9  / BANDS % x                            10.6   11.35 )-----------( 272      ( 17 Jul 2020 00:27 )             36.7     Auto Eosinophil # 0.31  / Auto Eosinophil % 2.7   / Auto Neutrophil # 9.10  / Auto Neutrophil % 80.2  / BANDS % x        07-18    149<H>  |  104  |  27<H>  ----------------------------<  94  3.5   |  28  |  1.23  07-17    148<H>  |  102  |  25<H>  ----------------------------<  130<H>  3.8   |  24  |  1.00  07-17    144  |  102  |  27<H>  ----------------------------<  131<H>  3.4<L>   |  24  |  1.06    Ca    9.1      18 Jul 2020 00:48  Mg     1.8     07-18  Phos  4.7     07-18  TPro  6.6  /  Alb  3.0<L>  /  TBili  0.2  /  DBili  x   /  AST  24  /  ALT  14  /  AlkPhos  94  07-17    PT/INR - ( 18 Jul 2020 00:48 )   PT: 20.6 sec;   INR: 1.78 ratio         PTT - ( 18 Jul 2020 00:48 )  PTT:38.2 sec      RADIOLOGY & ADDITIONAL TESTS:    Imaging Personally Reviewed:    Consultant(s) Notes Reviewed:      Care Discussed with Consultants/Other Providers:

## 2020-07-18 NOTE — PROGRESS NOTE ADULT - ATTENDING COMMENTS
80F PMHx of  blindness, COPD (unknown level O2 at home), PE ON A/C, CHF, schizoaffective disorder brought in by relative due to altered mental status found to be in hypoxemic and hypercapnic respiratory failure. Pt now tolerating NC but would cont NIV at night. Repeat blood gas. Can restart TF but not while on NIV. Cont c-collar for c1 fx. f/u NSG reccs. Complete abx course. COnt a/c for PE. COnt prn haldol and standing seroquel for icu delirium. Plan for transfer

## 2020-07-18 NOTE — CHART NOTE - NSCHARTNOTEFT_GEN_A_CORE
MICU Transfer Note    Transfer from: MICU  Transfer to:  (*) Medicine    (  ) Telemetry    (  ) RCU    (  ) Palliative    (  ) Stroke Unit    (  ) _______________  Accepting physician:      MICU COURSE:    79 y/o blind female with pmhx of multiple episodes of PE (last one approximately 2 years ago), COPD (unknown level O2 at home), DM2, CHF, schizoaffective disorder brought in by relative due to altered mental status. History limited due to mental state of patient, however obtained from son.      History limited as communication with patient limited due to mental status and no relative present to provide details of HPI. Per ED staff, who was present when son initially brought her to hospital earlier in evening, altered mentation began on 11AM on day of presentation; she was noted to be more lethargic with decreased response to verbal stimuli and minimally ambulatory (though at baseline, patient dependent on others for activities of daily living). ROS reportedly negative for fevers, chills, dysuria, N/V.    In the ED:  VS: BP /54-69, HR 65-89, RR 17-25, O2 Sat 84% on RA --> 100% 2L NC --> BiPAP, Tmax 98.2.  Labs: CBC - WBC 10.64, Hgb 10.5, Plts 292. CMP - BUN/Cr 35/1.32, Phos 5.3, Mg 1.5, CMP otherwise WNL. BNP 93235. UA - protein 30, hyaline casts 8, otherwise unremarkable. Tox screen negative. COVID negative.  Imaging:   CXR - Bilateral patchy opacities may represent multifocal pneumonia or pulmonary edema. Bilateral pleural effusions.   CTH - No acute intracranial hemorrhage, large cortical infarct or mass effect. Somewhat eccentric lucency in the right cerebellum, which be due to artifact although infarct cannot be excluded. If clinically indicated, short-term follow-up or MRI may be obtained for further evaluation. Partially visualized, age-indeterminate displaced fracture of the left C1 anterior and posterior arch. Asymmetry of the lateral atlantodental space. Recommend comparison to prior outside study.  CT cervical spine - Age-indeterminate, displaced left C1 anterior and posterior arch fracture as described, which may be acute given mild prevertebral soft tissue stranding/edema and no significant sclerosis at the fracture margin. Question mildly increased attenuation surrounding the thecal sac in the spinal canal. Extra-axial/epidural hematoma cannot be excluded. MRI is recommended for further evaluation of ligamentous injury and extra-axial hematoma. Nonspecific bilateral mastoid opacification. Recommend clinical correlation to assess acute mastoiditis.  CT Chest + ABD/pelvis - Small bilateral pleural effusion with atelectasis. Cardiomegaly. Nonspecific interlobular septal thickening and ground glass/patchy opacities in both lungs, which can be seen in pulmonary edema. Recommend clinical correlation to assess underlying pneumonia. No bowel obstruction. Abdominal wall hernias as described. Nonspecific mild bilateral perinephric stranding without hydroureteronephrosis. Diffuse prominent bladder wall with nonspecific perivesical stranding. Recommend clinical correlation to assess urinary tract infection. Nonspecific stranding in the right lateral abdominal wall soft tissue, which may represent edema and/or cellulitis. Recommend direct visualization.          Vital Signs Last 24 Hrs  T(C): 36.4 (18 Jul 2020 12:00), Max: 37.2 (17 Jul 2020 13:00)  T(F): 97.5 (18 Jul 2020 12:00), Max: 99 (17 Jul 2020 13:00)  HR: 74 (18 Jul 2020 12:00) (61 - 97)  BP: 111/52 (18 Jul 2020 12:00) (90/53 - 123/60)  BP(mean): 75 (18 Jul 2020 12:00) (67 - 87)  RR: 18 (18 Jul 2020 12:00) (18 - 37)  SpO2: 100% (18 Jul 2020 12:00) (93% - 100%)  I&O's Summary    17 Jul 2020 07:01  -  18 Jul 2020 07:00  --------------------------------------------------------  IN: 194.5 mL / OUT: 1415 mL / NET: -1220.5 mL    18 Jul 2020 07:01  -  18 Jul 2020 12:58  --------------------------------------------------------  IN: 90 mL / OUT: 85 mL / NET: 5 mL          MEDICATIONS  (STANDING):  cefTRIAXone   IVPB 1000 milliGRAM(s) IV Intermittent every 24 hours  chlorhexidine 4% Liquid 1 Application(s) Topical <User Schedule>  dextrose 5%. 1000 milliLiter(s) (50 mL/Hr) IV Continuous <Continuous>  dextrose 50% Injectable 12.5 Gram(s) IV Push once  dextrose 50% Injectable 25 Gram(s) IV Push once  dextrose 50% Injectable 25 Gram(s) IV Push once  insulin lispro (HumaLOG) corrective regimen sliding scale   SubCutaneous every 6 hours  midodrine 20 milliGRAM(s) Oral every 8 hours  pantoprazole  Injectable 40 milliGRAM(s) IV Push daily  QUEtiapine 25 milliGRAM(s) Oral two times a day  rivaroxaban 20 milliGRAM(s) Oral daily    MEDICATIONS  (PRN):  dextrose 40% Gel 15 Gram(s) Oral once PRN Blood Glucose LESS THAN 70 milliGRAM(s)/deciLiter  glucagon  Injectable 1 milliGRAM(s) IntraMuscular once PRN Glucose <70 milliGRAM(s)/deciLiter  haloperidol    Injectable 1 milliGRAM(s) IV Push every 6 hours PRN agitation        LABS                                            9.8                   Neurophils% (auto):   79.9   (07-18 @ 00:48):    8.31 )-----------(233          Lymphocytes% (auto):  8.7                                           33.9                   Eosinphils% (auto):   2.5      Manual%: Neutrophils x    ; Lymphocytes x    ; Eosinophils x    ; Bands%: x    ; Blasts x                                    149    |  104    |  27                  Calcium: 9.1   / iCa: x      (07-18 @ 00:48)    ----------------------------<  94        Magnesium: 1.8                              3.5     |  28     |  1.23             Phosphorous: 4.7        ( 07-18 @ 00:48 )   PT: 20.6 sec;   INR: 1.78 ratio  aPTT: 38.2 sec          ASSESSMENT & PLAN:         For Follow-Up: MICU Transfer Note    Transfer from: MICU  Transfer to:  (*) Medicine    (  ) Telemetry    (  ) RCU    (  ) Palliative    (  ) Stroke Unit    (  ) _______________  Accepting physician:      MICU COURSE:    79 y/o blind female with pmhx of multiple episodes of PE (last one approximately 2 years ago), COPD (unknown level O2 at home), DM2, CHF, and schizoaffective disorder brought in by relative due to altered mental status. History limited due to mental status of patient, however obtained from son. Was brought to the ED after noted to be more lethargic at home and           Vital Signs Last 24 Hrs  T(C): 36.4 (18 Jul 2020 12:00), Max: 37.2 (17 Jul 2020 13:00)  T(F): 97.5 (18 Jul 2020 12:00), Max: 99 (17 Jul 2020 13:00)  HR: 74 (18 Jul 2020 12:00) (61 - 97)  BP: 111/52 (18 Jul 2020 12:00) (90/53 - 123/60)  BP(mean): 75 (18 Jul 2020 12:00) (67 - 87)  RR: 18 (18 Jul 2020 12:00) (18 - 37)  SpO2: 100% (18 Jul 2020 12:00) (93% - 100%)  I&O's Summary    17 Jul 2020 07:01  -  18 Jul 2020 07:00  --------------------------------------------------------  IN: 194.5 mL / OUT: 1415 mL / NET: -1220.5 mL    18 Jul 2020 07:01  -  18 Jul 2020 12:58  --------------------------------------------------------  IN: 90 mL / OUT: 85 mL / NET: 5 mL          MEDICATIONS  (STANDING):  cefTRIAXone   IVPB 1000 milliGRAM(s) IV Intermittent every 24 hours  chlorhexidine 4% Liquid 1 Application(s) Topical <User Schedule>  dextrose 5%. 1000 milliLiter(s) (50 mL/Hr) IV Continuous <Continuous>  dextrose 50% Injectable 12.5 Gram(s) IV Push once  dextrose 50% Injectable 25 Gram(s) IV Push once  dextrose 50% Injectable 25 Gram(s) IV Push once  insulin lispro (HumaLOG) corrective regimen sliding scale   SubCutaneous every 6 hours  midodrine 20 milliGRAM(s) Oral every 8 hours  pantoprazole  Injectable 40 milliGRAM(s) IV Push daily  QUEtiapine 25 milliGRAM(s) Oral two times a day  rivaroxaban 20 milliGRAM(s) Oral daily    MEDICATIONS  (PRN):  dextrose 40% Gel 15 Gram(s) Oral once PRN Blood Glucose LESS THAN 70 milliGRAM(s)/deciLiter  glucagon  Injectable 1 milliGRAM(s) IntraMuscular once PRN Glucose <70 milliGRAM(s)/deciLiter  haloperidol    Injectable 1 milliGRAM(s) IV Push every 6 hours PRN agitation        LABS                                            9.8                   Neurophils% (auto):   79.9   (07-18 @ 00:48):    8.31 )-----------(233          Lymphocytes% (auto):  8.7                                           33.9                   Eosinphils% (auto):   2.5      Manual%: Neutrophils x    ; Lymphocytes x    ; Eosinophils x    ; Bands%: x    ; Blasts x                                    149    |  104    |  27                  Calcium: 9.1   / iCa: x      (07-18 @ 00:48)    ----------------------------<  94        Magnesium: 1.8                              3.5     |  28     |  1.23             Phosphorous: 4.7        ( 07-18 @ 00:48 )   PT: 20.6 sec;   INR: 1.78 ratio  aPTT: 38.2 sec          ASSESSMENT & PLAN:         For Follow-Up: MICU Transfer Note    Transfer from: MICU  Transfer to:  (*) Medicine    (  ) Telemetry    (  ) RCU    (  ) Palliative    (  ) Stroke Unit    (  ) _______________  Accepting physician:      MICU COURSE:    79 y/o blind female with pmhx of multiple episodes of PE (last one approximately 2 years ago), COPD (unknown level O2 at home), DM2, CHF, and schizoaffective disorder brought in by relative due to altered mental status. History limited due to mental status of patient, however obtained from son. Was brought to the ED after noted to be more lethargic at home, having a decreased response to verbal stimuli, and becoming more minimally ambulatory, however her baseline is dependency on son and daughter-in-law law for ADL. While in the ED, VS: BP /54-69, HR 65-89, RR 17-25, O2 Sat 84% on RA. Labs demonstrated mildly elevated WBC 10.64, Hgb 10.5, and plts 292. BUN/Cr 35/1.32, Phos 5.3, Mg 1.5, the other values were normal. [place]NP [place] elevated at [address]. UA demonstrated protein 30, hyaline casts 8, but unremarkable. Urine cx: no growth. Tox screen negative. Initial blood cx: Staph. haemolytics and Staph. epidermidis and 2nd blood cx.: no growth. COVID negative x2. EKG demonstrating NSR with LAD/nonspecific T wave abnormalities. Imaging demonstrated a CXR with b/l patchy opacities and pleural effusions. CTH demonstrated no intracranial bleeds, but demonstrated a C1 displaced fracture of the left anterior and poster arch which was later confirmed on CT spine. CT chest and abdomen/pelvis demonstrated small b/l pleural effusion with atelectasis, interlobular septal thickening, and ground glass/patchy opacities. Cardiomegaly present and mild bilateral perinephric stranding without hydroureteronephrosis and perivesical stranding of the bladder wall. Due to the CT chest findings patient was started on ceftriaxone and azithromycin for possible pneumonia. TTE demonstrated an EF of 25% with severely dilated LV and pulmonary HTN. Due to the patient going into hypoxemic and hypercarbic respiratory failure 2/2 to CHF exacerbation, she was given 100% 2L NC, no improvement so was placed on nasal BiPAP (given her C-collar from her C1 fracture). Received Lasix 40 IV.      Vital Signs Last 24 Hrs  T(C): 36.4 (18 Jul 2020 12:00), Max: 37.2 (17 Jul 2020 13:00)  T(F): 97.5 (18 Jul 2020 12:00), Max: 99 (17 Jul 2020 13:00)  HR: 74 (18 Jul 2020 12:00) (61 - 97)  BP: 111/52 (18 Jul 2020 12:00) (90/53 - 123/60)  BP(mean): 75 (18 Jul 2020 12:00) (67 - 87)  RR: 18 (18 Jul 2020 12:00) (18 - 37)  SpO2: 100% (18 Jul 2020 12:00) (93% - 100%)  I&O's Summary    17 Jul 2020 07:01  -  18 Jul 2020 07:00  --------------------------------------------------------  IN: 194.5 mL / OUT: 1415 mL / NET: -1220.5 mL    18 Jul 2020 07:01  -  18 Jul 2020 12:58  --------------------------------------------------------  IN: 90 mL / OUT: 85 mL / NET: 5 mL          MEDICATIONS  (STANDING):  cefTRIAXone   IVPB 1000 milliGRAM(s) IV Intermittent every 24 hours  chlorhexidine 4% Liquid 1 Application(s) Topical <User Schedule>  dextrose 5%. 1000 milliLiter(s) (50 mL/Hr) IV Continuous <Continuous>  dextrose 50% Injectable 12.5 Gram(s) IV Push once  dextrose 50% Injectable 25 Gram(s) IV Push once  dextrose 50% Injectable 25 Gram(s) IV Push once  insulin lispro (HumaLOG) corrective regimen sliding scale   SubCutaneous every 6 hours  midodrine 20 milliGRAM(s) Oral every 8 hours  pantoprazole  Injectable 40 milliGRAM(s) IV Push daily  QUEtiapine 25 milliGRAM(s) Oral two times a day  rivaroxaban 20 milliGRAM(s) Oral daily    MEDICATIONS  (PRN):  dextrose 40% Gel 15 Gram(s) Oral once PRN Blood Glucose LESS THAN 70 milliGRAM(s)/deciLiter  glucagon  Injectable 1 milliGRAM(s) IntraMuscular once PRN Glucose <70 milliGRAM(s)/deciLiter  haloperidol    Injectable 1 milliGRAM(s) IV Push every 6 hours PRN agitation        LABS                                            9.8                   Neurophils% (auto):   79.9   (07-18 @ 00:48):    8.31 )-----------(233          Lymphocytes% (auto):  8.7                                           33.9                   Eosinphils% (auto):   2.5      Manual%: Neutrophils x    ; Lymphocytes x    ; Eosinophils x    ; Bands%: x    ; Blasts x                                    149    |  104    |  27                  Calcium: 9.1   / iCa: x      (07-18 @ 00:48)    ----------------------------<  94        Magnesium: 1.8                              3.5     |  28     |  1.23             Phosphorous: 4.7        ( 07-18 @ 00:48 )   PT: 20.6 sec;   INR: 1.78 ratio  aPTT: 38.2 sec          ASSESSMENT & PLAN:         For Follow-Up:    -   - MRI of cervical spine when medically stable MICU Transfer Note    Transfer from: MICU  Transfer to:  (*) Medicine    (  ) Telemetry    (  ) RCU    (  ) Palliative    (  ) Stroke Unit    (  ) _______________  Accepting physician:      MICU COURSE:    81 y/o blind female with pmhx of multiple episodes of PE (last one approximately 2 years ago), COPD (unknown level O2 at home), DM2, CHF, and schizoaffective disorder brought in by relative due to altered mental status. History limited due to mental status of patient, however obtained from son. Was brought to the ED after noted to be more lethargic at home, having a decreased response to verbal stimuli, and becoming more minimally ambulatory, however her baseline is dependency on son and daughter-in-law law for ADL. While in the ED, VS: BP /54-69, HR 65-89, RR 17-25, O2 Sat 84% on RA. Labs demonstrated mildly elevated WBC 10.64, Hgb 10.5, and plts 292. BUN/Cr 35/1.32, Phos 5.3, Mg 1.5, the other values were normal. [place]NP [place] at [address]. UA demonstrated protein 30, hyaline casts 8, but unremarkable. Urine cx: no growth. Tox screen negative. Initial blood cx: Staph. haemolytics and Staph. epidermidis and 2nd blood cx.: no growth. COVID negative x2. EKG demonstrating NSR with LAD/nonspecific T wave abnormalities. Imaging demonstrated a CXR with b/l patchy opacities and pleural effusions. CTH demonstrated no intracranial bleeds, but demonstrated a C1 displaced fracture of the left anterior and poster arch which was later confirmed on CT spine. CT chest and abdomen/pelvis demonstrated small b/l pleural effusion with atelectasis, interlobular septal thickening, and ground glass/patchy opacities. Cardiomegaly present and mild bilateral perinephric stranding without hydroureteronephrosis and perivesical stranding of the bladder wall. Due to the CT chest findings patient was started on ceftriaxone and azithromycin for possible pneumonia. TTE demonstrated an EF of 25% with severely dilated LV and pulmonary HTN. Due to the patient going into hypoxemic and hypercarbic respiratory failure 2/2 to CHF exacerbation, she was given 100% 2L NC, no improvement so was placed on nasal BiPAP (given her C-collar from her C1 fracture). Received Lasix 40 IV. Currently on 2 L of NC and nasal BiPAP at night, saturating well.      Vital Signs Last 24 Hrs  T(C): 36.4 (18 Jul 2020 12:00), Max: 37.2 (17 Jul 2020 13:00)  T(F): 97.5 (18 Jul 2020 12:00), Max: 99 (17 Jul 2020 13:00)  HR: 74 (18 Jul 2020 12:00) (61 - 97)  BP: 111/52 (18 Jul 2020 12:00) (90/53 - 123/60)  BP(mean): 75 (18 Jul 2020 12:00) (67 - 87)  RR: 18 (18 Jul 2020 12:00) (18 - 37)  SpO2: 100% (18 Jul 2020 12:00) (93% - 100%)  I&O's Summary    17 Jul 2020 07:01  -  18 Jul 2020 07:00  --------------------------------------------------------  IN: 194.5 mL / OUT: 1415 mL / NET: -1220.5 mL    18 Jul 2020 07:01  -  18 Jul 2020 12:58  --------------------------------------------------------  IN: 90 mL / OUT: 85 mL / NET: 5 mL        MEDICATIONS  (STANDING):  cefTRIAXone   IVPB 1000 milliGRAM(s) IV Intermittent every 24 hours  chlorhexidine 4% Liquid 1 Application(s) Topical <User Schedule>  dextrose 5%. 1000 milliLiter(s) (50 mL/Hr) IV Continuous <Continuous>  dextrose 50% Injectable 12.5 Gram(s) IV Push once  dextrose 50% Injectable 25 Gram(s) IV Push once  dextrose 50% Injectable 25 Gram(s) IV Push once  insulin lispro (HumaLOG) corrective regimen sliding scale   SubCutaneous every 6 hours  midodrine 20 milliGRAM(s) Oral every 8 hours  pantoprazole  Injectable 40 milliGRAM(s) IV Push daily  QUEtiapine 25 milliGRAM(s) Oral two times a day  rivaroxaban 20 milliGRAM(s) Oral daily    MEDICATIONS  (PRN):  dextrose 40% Gel 15 Gram(s) Oral once PRN Blood Glucose LESS THAN 70 milliGRAM(s)/deciLiter  glucagon  Injectable 1 milliGRAM(s) IntraMuscular once PRN Glucose <70 milliGRAM(s)/deciLiter  haloperidol    Injectable 1 milliGRAM(s) IV Push every 6 hours PRN agitation        LABS                                            9.8                   Neurophils% (auto):   79.9   (07-18 @ 00:48):    8.31 )-----------(233          Lymphocytes% (auto):  8.7                                           33.9                   Eosinphils% (auto):   2.5      Manual%: Neutrophils x    ; Lymphocytes x    ; Eosinophils x    ; Bands%: x    ; Blasts x                                    149    |  104    |  27                  Calcium: 9.1   / iCa: x      (07-18 @ 00:48)    ----------------------------<  94        Magnesium: 1.8                              3.5     |  28     |  1.23             Phosphorous: 4.7        ( 07-18 @ 00:48 )   PT: 20.6 sec;   INR: 1.78 ratio  aPTT: 38.2 sec      ASSESSMENT & PLAN:     79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    #Neuro  **AMS  - Intermittently agitated, reportedly A&Ox1 at baseline  - On Home med Seroquel 25 mg    **Schizoaffective disorder  - As above    #Respiratory  **Hypercarbic respiratory failure, in the setting of most likely a CHF exacerbation  - On admission, O2 sat 84% on RA, improved with BIPAP  - Initial VBG 7.17/80/32/28, improved to 7.2/64/22/24.   - Last ABG on 7/17 pH 7.28, pCO2 67, pO2 89, HCO3 24  - Continue on 2 L NC AM and BiPAP PM  - Trend ABGs    **Pulmonary edema, in the setting of suspected CHF exacerbation  - CXR demonstrating b/l patchy opacities indicating pulm edema; b/l pleural effusions  - CT demonstrating ground glass opacities b/l  - Hold lasix. Monitor ins & outs.    **PNA, low suspicion  - COVID negative x 2  - s/p zosyn x 1  - On ceftriaxone Day 4, if community-acquired.   - Legionella negative  - F/u RVP    #Cardiovascular  **CHF exacerbation, suspected  - Admission EKG demonstrating NSR with LAD/nonspecific T wave abnormalities  - BMP 39031  - Hold lasix. Monitor I&Os.  - TTE demonstrated EF: 25%, severely dilated ventricle, and pulmonary HTN    #GI  - No acute issues  - Pantoprazole prophylaxis - 40 IV daily    #/Renal  **MARIAN  - On admission, Cr 1.32, baseline unknown (patient has no other records aside from this admission)  - Current Cr 1.23  - Trend BMP    #Endocrine  - Type 2 DM  - on insulin sliding scale, POCT glucose: 80, HbA1c: 6.4  - No acute issues  - T3 51, awaiting TSH Ab and T4    #MSK  - CT spine:  Age-indeterminate, displaced left C1 anterior and posterior arch fracture as described, which may be acute given mild prevertebral soft tissue stranding/edema and no significant sclerosis at the fracture margin. Question mildly increased attenuation surrounding the thecal sac in the spinal canal. Extra-axial/epidural hematoma cannot be excluded. MRI is recommended for further evaluation of ligamentous injury and extra-axial hematoma.  Nonspecific bilateral mastoid opacification. Recommend clinical correlation to assess acute mastoiditis.  - Neurosx saw pt recs no acute neurosx intervention, continue C-collar and MRI C-spine when medically stable    #ID  **PNA  - COVID negative x 2  - On ceftriaxone, likely community-acquired  - Legionella negative, off azithromycin  - UA unremarkable  - Bcx: initial: Staph. epidermidis, 2nd: no growth, urine cx: no growth    #Heme/Onc  - Hgb 10.6 (baseline unknown)  - Trend CBC    #Diet  - Starting on tubal feedings    #DVT Prophylaxis  - Heparin SubQ 2/2 MARIAN    #GOC  - DNR/DNI      For Follow-Up:  - On 2L of NC and BiPAP  - MRI of cervical spine when medically stable due to C1 fracture MICU Transfer Note    Transfer from: MICU  Transfer to:  (*) Medicine    (  ) Telemetry    (  ) RCU    (  ) Palliative    (  ) Stroke Unit    (  ) _______________  Accepting physician: Dr. George GARCIA COURSE:    79 y/o blind female with pmhx of multiple episodes of PE (last one approximately 2 years ago), COPD (unknown level O2 at home), DM2, CHF, and schizoaffective disorder brought in by relative due to altered mental status. History limited due to mental status of patient, however obtained from son. Was brought to the ED after noted to be more lethargic at home, having a decreased response to verbal stimuli, and becoming more minimally ambulatory, however her baseline is dependency on son and daughter-in-law law for ADL. While in the ED, VS: BP /54-69, HR 65-89, RR 17-25, O2 Sat 84% on RA. Labs demonstrated mildly elevated WBC 10.64, Hgb 10.5, and plts 292. BUN/Cr 35/1.32, Phos 5.3, Mg 1.5, the other values were normal. [place]NP [place] at [address]. UA demonstrated protein 30, hyaline casts 8, but unremarkable. Urine cx: no growth. Tox screen negative. Initial blood cx: Staph. haemolytics and Staph. epidermidis and 2nd blood cx.: no growth. COVID negative x2. EKG demonstrating NSR with LAD/nonspecific T wave abnormalities. Imaging demonstrated a CXR with b/l patchy opacities and pleural effusions. CTH demonstrated no intracranial bleeds, but demonstrated a C1 displaced fracture of the left anterior and poster arch which was later confirmed on CT spine. CT chest and abdomen/pelvis demonstrated small b/l pleural effusion with atelectasis, interlobular septal thickening, and ground glass/patchy opacities. Cardiomegaly present and mild bilateral perinephric stranding without hydroureteronephrosis and perivesical stranding of the bladder wall. Due to the CT chest findings patient was started on ceftriaxone and azithromycin for possible pneumonia. TTE demonstrated an EF of 25% with severely dilated LV and pulmonary HTN. Due to the patient going into hypoxemic and hypercarbic respiratory failure 2/2 to CHF exacerbation, she was given 100% 2L NC, no improvement so was placed on nasal BiPAP (given her C-collar from her C1 fracture). Received Lasix 40 IV. Currently on 2 L of NC and nasal BiPAP at night, saturating well.      Vital Signs Last 24 Hrs  T(C): 36.4 (18 Jul 2020 12:00), Max: 37.2 (17 Jul 2020 13:00)  T(F): 97.5 (18 Jul 2020 12:00), Max: 99 (17 Jul 2020 13:00)  HR: 74 (18 Jul 2020 12:00) (61 - 97)  BP: 111/52 (18 Jul 2020 12:00) (90/53 - 123/60)  BP(mean): 75 (18 Jul 2020 12:00) (67 - 87)  RR: 18 (18 Jul 2020 12:00) (18 - 37)  SpO2: 100% (18 Jul 2020 12:00) (93% - 100%)  I&O's Summary    17 Jul 2020 07:01  -  18 Jul 2020 07:00  --------------------------------------------------------  IN: 194.5 mL / OUT: 1415 mL / NET: -1220.5 mL    18 Jul 2020 07:01  -  18 Jul 2020 12:58  --------------------------------------------------------  IN: 90 mL / OUT: 85 mL / NET: 5 mL        MEDICATIONS  (STANDING):  cefTRIAXone   IVPB 1000 milliGRAM(s) IV Intermittent every 24 hours  chlorhexidine 4% Liquid 1 Application(s) Topical <User Schedule>  dextrose 5%. 1000 milliLiter(s) (50 mL/Hr) IV Continuous <Continuous>  dextrose 50% Injectable 12.5 Gram(s) IV Push once  dextrose 50% Injectable 25 Gram(s) IV Push once  dextrose 50% Injectable 25 Gram(s) IV Push once  insulin lispro (HumaLOG) corrective regimen sliding scale   SubCutaneous every 6 hours  midodrine 20 milliGRAM(s) Oral every 8 hours  pantoprazole  Injectable 40 milliGRAM(s) IV Push daily  QUEtiapine 25 milliGRAM(s) Oral two times a day  rivaroxaban 20 milliGRAM(s) Oral daily    MEDICATIONS  (PRN):  dextrose 40% Gel 15 Gram(s) Oral once PRN Blood Glucose LESS THAN 70 milliGRAM(s)/deciLiter  glucagon  Injectable 1 milliGRAM(s) IntraMuscular once PRN Glucose <70 milliGRAM(s)/deciLiter  haloperidol    Injectable 1 milliGRAM(s) IV Push every 6 hours PRN agitation        LABS                                            9.8                   Neurophils% (auto):   79.9   (07-18 @ 00:48):    8.31 )-----------(233          Lymphocytes% (auto):  8.7                                           33.9                   Eosinphils% (auto):   2.5      Manual%: Neutrophils x    ; Lymphocytes x    ; Eosinophils x    ; Bands%: x    ; Blasts x                                    149    |  104    |  27                  Calcium: 9.1   / iCa: x      (07-18 @ 00:48)    ----------------------------<  94        Magnesium: 1.8                              3.5     |  28     |  1.23             Phosphorous: 4.7        ( 07-18 @ 00:48 )   PT: 20.6 sec;   INR: 1.78 ratio  aPTT: 38.2 sec      ASSESSMENT & PLAN:     81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    #Neuro  **AMS  - Intermittently agitated, reportedly A&Ox1 at baseline  - On Home med Seroquel 25 mg    **Schizoaffective disorder  - As above    #Respiratory  **Hypercarbic respiratory failure, in the setting of most likely a CHF exacerbation  - On admission, O2 sat 84% on RA, improved with BIPAP  - Initial VBG 7.17/80/32/28, improved to 7.2/64/22/24.   - Last ABG on 7/17 pH 7.28, pCO2 67, pO2 89, HCO3 24  - Continue on 2 L NC AM and BiPAP PM  - Trend ABGs    **Pulmonary edema, in the setting of suspected CHF exacerbation  - CXR demonstrating b/l patchy opacities indicating pulm edema; b/l pleural effusions  - CT demonstrating ground glass opacities b/l  - Hold lasix. Monitor ins & outs.    **PNA, low suspicion  - COVID negative x 2  - s/p zosyn x 1  - On ceftriaxone Day 4, if community-acquired.   - Legionella negative  - F/u RVP    #Cardiovascular  **CHF exacerbation, suspected  - Admission EKG demonstrating NSR with LAD/nonspecific T wave abnormalities  - BMP [id number]  - Hold lasix. Monitor I&Os.  - TTE demonstrated EF: 25%, severely dilated ventricle, and pulmonary HTN    #GI  - No acute issues  - Pantoprazole prophylaxis - 40 IV daily    #/Renal  **MARIAN  - On admission, Cr 1.32, baseline unknown (patient has no other records aside from this admission)  - Current Cr 1.23  - Trend BMP    #Endocrine  - Type 2 DM  - on insulin sliding scale, POCT glucose: 80, HbA1c: 6.4  - No acute issues  - T3 51, awaiting TSH Ab and T4    #MSK  - CT spine:  Age-indeterminate, displaced left C1 anterior and posterior arch fracture as described, which may be acute given mild prevertebral soft tissue stranding/edema and no significant sclerosis at the fracture margin. Question mildly increased attenuation surrounding the thecal sac in the spinal canal. Extra-axial/epidural hematoma cannot be excluded. MRI is recommended for further evaluation of ligamentous injury and extra-axial hematoma.  Nonspecific bilateral mastoid opacification. Recommend clinical correlation to assess acute mastoiditis.  - Neurosx saw pt recs no acute neurosx intervention, continue C-collar and MRI C-spine when medically stable    #ID  **PNA  - COVID negative x 2  - On ceftriaxone, likely community-acquired  - Legionella negative, off azithromycin  - UA unremarkable  - Bcx: initial: Staph. epidermidis, 2nd: no growth, urine cx: no growth    #Heme/Onc  - Hgb 10.6 (baseline unknown)  - Trend CBC    #Diet  - Starting on tubal feedings    #DVT Prophylaxis  - Heparin SubQ 2/2 MARIAN    #GOC  - DNR/DNI      For Follow-Up:  - On 2L of NC and BiPAP  - MRI of cervical spine when medically stable due to C1 fracture

## 2020-07-18 NOTE — CHART NOTE - NSCHARTNOTEFT_GEN_A_CORE
Nutrition consult received for Tube Feeding.   Patient seen for Dietitian Initial Evaluation yesterday 7/17.     Per chart, pt is a "80F PMHx of recent blindess, COPD (unknown level O2 at home), DM2, CHF, schizoaffective disorder brought in by relative due to altered mental status found to be in hypoxemic and hypercapnic respiratory failure."    Medications, labs reviewed. Noted HbA1c 6.4% (7/17). POCT blood glucose <150 mg/dL since admission. Pt previously on Dysphagia 3 Soft with Thin Liquids, now ordered for nasogastric tube feeding. Patient seen, noted to be confused, disoriented and Farsi speaking. Tube feeds observed running at 40 ml/hr.     Estimated Nutrition Needs:  · Using IBW 45.5 kg (using IBW 2/2 increased edema)   Estimated Energy Needs (25-30 calories/kg): 3987-2610   Estimated Protein Needs (1.6-1.8 gm/kg): 72.48-81.54    Enteral Nutrition: Glucerna 1.5 @ 75 ml/hr x 24 hours  Provides: 1800 ml formula, 2700 kcal, 149 gm protein  Meets: 59 kcal/kg, 3.3 g/kg protein using IBW 45.5 kg  ** Current regimen is excessive of calorie and protein needs    Recommend:   1) Adjust regimen to: Vital AF (1.2 cosmo/mL) @ 45 ml/hr x 24 hours. Provides: 1080 ml formula, 1296 kcal, 81 gm protein (meeting 28.4 kcal/kg, 1.8 g/kg protein using IBW 45.5 kg).   2) Defer additional fluids to team, noted Sodium 149 mmol/L today, above recommended tube feeding regimen provides 876 ml free water. Consider 100 ml q6 hours. Nutrition consult received for Tube Feeding.   Patient seen for Dietitian Initial Evaluation yesterday 7/17.     Per chart, pt is a "80F PMHx of recent blindess, COPD (unknown level O2 at home), DM2, CHF, schizoaffective disorder brought in by relative due to altered mental status found to be in hypoxemic and hypercapnic respiratory failure."    Medications, labs reviewed. Noted HbA1c 6.4% (7/17). POCT blood glucose <150 mg/dL since admission. Pt previously on Dysphagia 3 Soft with Thin Liquids, now ordered for nasogastric tube feeding. Patient seen, noted to be confused, disoriented and Farsi speaking. Tube feeds observed running at 40 ml/hr.     Estimated Nutrition Needs:  · Using IBW 45.5 kg (using IBW 2/2 increased edema)   Estimated Energy Needs (25-30 calories/kg): 6320-0075   Estimated Protein Needs (1.6-1.8 gm/kg): 72.48-81.54    Enteral Nutrition: Glucerna 1.5 @ 75 ml/hr x 24 hours  Provides: 1800 ml formula, 2700 kcal, 149 gm protein  Meets: 59 kcal/kg, 3.3 g/kg protein using IBW 45.5 kg  ** Current regimen is excessive of calorie and protein needs    Recommend:   1) Adjust regimen to: Vital AF (1.2 kcal/mL) @ 45 ml/hr x 24 hours. Provides: 1080 ml formula, 1296 kcal, 81 gm protein (meeting 28.4 kcal/kg, 1.8 g/kg protein using IBW 45.5 kg).   2) Defer additional fluids to team, noted Sodium 149 mmol/L today, above recommended tube feeding regimen provides 876 ml free water.     Continue to monitor and evaluate:   - Tolerance to tube feeding and nutritional intake  - Labs: electrolytes, POCT blood glucose, renal indices, LFTs  - Weight trends, GI function, skin integrity    Anastasyia Haskins RD, CDN    Pager# [phone]

## 2020-07-18 NOTE — PROGRESS NOTE ADULT - ASSESSMENT
80F PMHx of recent blindess, COPD (unknown level O2 at home), DM2, CHF, schizoaffective disorder brought in by relative due to altered mental status found to be in hypoxemic and hypercapnic respiratory failure.    #Neuro  **AMS  - Intermittently agitated, reportedly A&Ox1 at baseline  - Off Precedex  - On Home med Seroquel 25 mg    **Schizoaffective disorder  - As above    #Respiratory  **Hypercarbic respiratory failure, in the setting of possible COPD exacerbation vs CHF exacerbation  - On admission, O2 sat 84% on RA, improved with BIPAP  - Initial VBG 7.17/80/32/28, improved to 7.2/64/22/24  - Continue on 2 L NC AM and BiPAP PM  - Trend ABGs    **Pulmonary edema, in the setting of suspected CHF exacerbation  - CXR demonstrating b/l patchy opacities indicating pulm edema; b/l pleural effusions  - CT demonstrating ground glass opacities b/l  - Receiving lasix 40 IV today. Output: 2.1 L. Will observe ins & outs.    **PNA, low suspicion  - COVID negative x 2  - s/p zosyn x 1  - On ceftriaxone Day 4, if community-acquired.   - Legionella negative, can stop azithromycin  - F/u RVP    #Cardiovascular  **CHF exacerbation, suspected  - Admission EKG demonstrating NSR with LAD/nonspecific T wave abnormalities  - BMP [id number]  - Received lasix 40 IV yesterday. Output: approximately 2.1 L. Will continue to monitor I&Os.  - TTE: < from: Transthoracic Echocardiogram (07.16.20 @ 08:04) >  < from: Transthoracic Echocardiogram (07.16.20 @ 08:04) >    EF (Visual Estimate): 25 %  Severely dilated left ventricle.  Severely decreased left ventricular systolic function.  Diffuse left ventricular hypokinesis.  Severe functional mitral regurgitation.  Transcatheter aortic valve replacement.Mild-moderate  paravalvular aortic regurgitation along the anterior aspect  of the prosthesis.  Moderate-severe pulmoanry hypertension. Estimated PASP 60  mmHg.    #GI  - No acute issues  - Pantoprazole prophylaxis - 40 IV daily    #/Renal  **MARIAN  - On admission, Cr 1.32, baseline unknown (patient has no other records aside from this admission)  - Current Cr 1.06  - Trend [facility]    #Endocrine  - Type 2 DM  - on insulin sliding scale, POCT glucose: 126, HbA1c: 6.4  - No acute issues  - T3 51, awaiting TSH Ab and T4    #MSK  - CT spine:  Age-indeterminate, displaced left C1 anterior and posterior arch fracture as described, which may be acute given mild prevertebral soft tissue stranding/edema and no significant sclerosis at the fracture margin. Question mildly increased attenuation surrounding the thecal sac in the spinal canal. Extra-axial/epidural hematoma cannot be excluded. MRI is recommended for further evaluation of ligamentous injury and extra-axial hematoma.  Nonspecific bilateral mastoid opacification. Recommend clinical correlation to assess acute mastoiditis.  - Neurosx saw pt recs no acute neurosx intervention, continue C-collar and MRI C-spine when medically stable    #ID  **PNA  - COVID negative x 2  - On ceftriaxone, likely community-acquired  - Legionella negative, off azithromycin  - UA unremarkable  - Bcx: initial: Staph. epidermidis, 2nd: no growth, urine cx: no growth    #Heme/Onc  - Hgb 10.6 (baseline unknown)  - Trend CBC    #DVT Prophylaxis  - Heparin SubQ 2/2 MARIAN    #GOC  - DNR/DNI 80F PMHx of recent blindess, COPD (unknown level O2 at home), DM2, CHF, schizoaffective disorder brought in by relative due to altered mental status found to be in hypoxemic and hypercapnic respiratory failure.    #Neuro  **AMS  - Intermittently agitated, reportedly A&Ox1 at baseline  - Off Precedex  - On Home med Seroquel 25 mg    **Schizoaffective disorder  - As above    #Respiratory  **Hypercarbic respiratory failure, in the setting of possible COPD exacerbation vs CHF exacerbation  - On admission, O2 sat 84% on RA, improved with BIPAP  - Initial VBG 7.17/80/32/28, improved to 7.2/64/22/24  - Continue on 2 L NC AM and BiPAP PM  - Trend ABGs    **Pulmonary edema, in the setting of suspected CHF exacerbation  - CXR demonstrating b/l patchy opacities indicating pulm edema; b/l pleural effusions  - CT demonstrating ground glass opacities b/l  - Receiving lasix 40 IV today. Output: 2.1 L. Will observe ins & outs.    **PNA, low suspicion  - COVID negative x 2  - s/p zosyn x 1  - On ceftriaxone Day 4, if community-acquired.   - Legionella negative, can stop azithromycin  - F/u RVP    #Cardiovascular  **CHF exacerbation, suspected  - Admission EKG demonstrating NSR with LAD/nonspecific T wave abnormalities  - BMP [id number]  - Received lasix 40 IV yesterday. Output: approximately 2.1 L. Will continue to monitor I&Os.  - TTE: < from: Transthoracic Echocardiogram (07.16.20 @ 08:04) >  < from: Transthoracic Echocardiogram (07.16.20 @ 08:04) >    EF (Visual Estimate): 25 %  Severely dilated left ventricle.  Severely decreased left ventricular systolic function.  Diffuse left ventricular hypokinesis.  Severe functional mitral regurgitation.  Transcatheter aortic valve replacement.Mild-moderate  paravalvular aortic regurgitation along the anterior aspect  of the prosthesis.  Moderate-severe pulmoanry hypertension. Estimated PASP 60  mmHg.    #GI  - No acute issues  - Pantoprazole prophylaxis - 40 IV daily    #/Renal  **MARIAN  - On admission, Cr 1.32, baseline unknown (patient has no other records aside from this admission)  - Current Cr 1.06  - Trend BMP  - Urine outuput: 15-20cc/hr at 11:30 pm.     #Endocrine  - Type 2 DM  - on insulin sliding scale, POCT glucose: 126, HbA1c: 6.4  - No acute issues  - T3 51, awaiting TSH Ab and T4    #MSK  - CT spine:  Age-indeterminate, displaced left C1 anterior and posterior arch fracture as described, which may be acute given mild prevertebral soft tissue stranding/edema and no significant sclerosis at the fracture margin. Question mildly increased attenuation surrounding the thecal sac in the spinal canal. Extra-axial/epidural hematoma cannot be excluded. MRI is recommended for further evaluation of ligamentous injury and extra-axial hematoma.  Nonspecific bilateral mastoid opacification. Recommend clinical correlation to assess acute mastoiditis.  - Neurosx saw pt recs no acute neurosx intervention, continue C-collar and MRI C-spine when medically stable    #ID  **PNA  - COVID negative x 2  - On ceftriaxone, likely community-acquired  - Legionella negative, off azithromycin  - UA unremarkable  - Bcx: initial: Staph. 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    #Neuro  **AMS  - Intermittently agitated, reportedly A&Ox1 at baseline  - On Home med Seroquel 25 mg    **Schizoaffective disorder  - As above    #Respiratory  **Hypercarbic respiratory failure, in the setting of most likely a CHF exacerbation  - On admission, O2 sat 84% on RA, improved with BIPAP  - Initial VBG 7.17/80/32/28, improved to 7.2/64/22/24.   - Last ABG on 7/17 pH 7.28, pCO2 67, pO2 89, HCO3 24  - Continue on 2 L NC AM and BiPAP PM  - Trend ABGs    **Pulmonary edema, in the setting of suspected CHF exacerbation  - CXR demonstrating b/l patchy opacities indicating pulm edema; b/l pleural effusions  - CT demonstrating ground glass opacities b/l  - Hold lasix. Monitor ins & outs.    **PNA, low suspicion  - COVID negative x 2  - s/p zosyn x 1  - On ceftriaxone Day 4, if community-acquired.   - Legionella negative  - F/u RVP    #Cardiovascular  **CHF exacerbation, suspected  - Admission EKG demonstrating NSR with LAD/nonspecific T wave abnormalities  - BMP [id number]  - Hold lasix. Monitor I&Os.  - TTE demonstrated EF: 25%, severely dilated ventricle, and pulmonary HTN    #GI  - No acute issues  - Pantoprazole prophylaxis - 40 IV daily    #/Renal  **MARIAN  - On admission, Cr 1.32, baseline unknown (patient has no other records aside from this admission)  - Current Cr 1.23  - Trend BMP    #Endocrine  - Type 2 DM  - on insulin sliding scale, POCT glucose: 80, HbA1c: 6.4  - No acute issues  - T3 51, awaiting TSH Ab and T4    #MSK  - CT spine:  Age-indeterminate, displaced left C1 anterior and posterior arch fracture as described, which may be acute given mild prevertebral soft tissue stranding/edema and no significant sclerosis at the fracture margin. Question mildly increased attenuation surrounding the thecal sac in the spinal canal. Extra-axial/epidural hematoma cannot be excluded. MRI is recommended for further evaluation of ligamentous injury and extra-axial hematoma.  Nonspecific bilateral mastoid opacification. Recommend clinical correlation to assess acute mastoiditis.  - Neurosx saw pt recs no acute neurosx intervention, continue C-collar and MRI C-spine when medically stable    #ID  **PNA  - COVID negative x 2  - On ceftriaxone, likely community-acquired  - Legionella negative, off azithromycin  - UA unremarkable  - Bcx: initial: Staph. epidermidis, 2nd: no growth, urine cx: no growth    #Heme/Onc  - Hgb 10.6 (baseline unknown)  - Trend CBC    #Diet  - Starting on tubal feedings    #DVT Prophylaxis  - Heparin SubQ 2/2 MARIAN    #GOC  - DNR/DNI

## 2020-07-19 LAB
APTT BLD: 30.2 SEC — SIGNIFICANT CHANGE UP (ref 27.5–35.5)
BASE EXCESS BLDA CALC-SCNC: 10.8 MMOL/L — HIGH (ref -2–2)
BUN SERPL-MCNC: 35 MG/DL — HIGH (ref 7–23)
CALCIUM SERPL-MCNC: 9.7 MG/DL — SIGNIFICANT CHANGE UP (ref 8.4–10.5)
CHLORIDE SERPL-SCNC: 105 MMOL/L — SIGNIFICANT CHANGE UP (ref 96–108)
CO2 BLDA-SCNC: 41 MMOL/L — HIGH (ref 22–30)
CO2 SERPL-SCNC: 31 MMOL/L — SIGNIFICANT CHANGE UP (ref 22–31)
CREAT SERPL-MCNC: 1.27 MG/DL — SIGNIFICANT CHANGE UP (ref 0.5–1.3)
GAS PNL BLDA: SIGNIFICANT CHANGE UP
GLUCOSE BLDC GLUCOMTR-MCNC: 136 MG/DL — HIGH (ref 70–99)
GLUCOSE BLDC GLUCOMTR-MCNC: 155 MG/DL — HIGH (ref 70–99)
GLUCOSE BLDC GLUCOMTR-MCNC: 162 MG/DL — HIGH (ref 70–99)
GLUCOSE BLDC GLUCOMTR-MCNC: 169 MG/DL — HIGH (ref 70–99)
GLUCOSE BLDC GLUCOMTR-MCNC: 175 MG/DL — HIGH (ref 70–99)
GLUCOSE SERPL-MCNC: 185 MG/DL — HIGH (ref 70–99)
HCO3 BLDA-SCNC: 39 MMOL/L — HIGH (ref 21–29)
HCT VFR BLD CALC: 37.8 % — SIGNIFICANT CHANGE UP (ref 34.5–45)
HGB BLD-MCNC: 10.6 G/DL — LOW (ref 11.5–15.5)
HOROWITZ INDEX BLDA+IHG-RTO: 28 — SIGNIFICANT CHANGE UP
INR BLD: 1.59 RATIO — HIGH (ref 0.88–1.16)
INTUBATED: YES — SIGNIFICANT CHANGE UP
INTUBATED: YES — SIGNIFICANT CHANGE UP
MAGNESIUM SERPL-MCNC: 1.9 MG/DL — SIGNIFICANT CHANGE UP (ref 1.6–2.6)
MCHC RBC-ENTMCNC: 24.4 PG — LOW (ref 27–34)
MCHC RBC-ENTMCNC: 28 GM/DL — LOW (ref 32–36)
MCV RBC AUTO: 87.1 FL — SIGNIFICANT CHANGE UP (ref 80–100)
NRBC # BLD: 0 /100 WBCS — SIGNIFICANT CHANGE UP (ref 0–0)
PCO2 BLDA: 76 MMHG — CRITICAL HIGH (ref 32–46)
PH BLDA: 7.33 — LOW (ref 7.35–7.45)
PHOSPHATE SERPL-MCNC: 4.6 MG/DL — HIGH (ref 2.5–4.5)
PLATELET # BLD AUTO: 238 K/UL — SIGNIFICANT CHANGE UP (ref 150–400)
PO2 BLDA: 99 MMHG — SIGNIFICANT CHANGE UP (ref 74–108)
POTASSIUM SERPL-MCNC: 4.1 MMOL/L — SIGNIFICANT CHANGE UP (ref 3.5–5.3)
POTASSIUM SERPL-SCNC: 4.1 MMOL/L — SIGNIFICANT CHANGE UP (ref 3.5–5.3)
PROTHROM AB SERPL-ACNC: 18.5 SEC — HIGH (ref 10.6–13.6)
RBC # BLD: 4.34 M/UL — SIGNIFICANT CHANGE UP (ref 3.8–5.2)
RBC # FLD: 16.3 % — HIGH (ref 10.3–14.5)
SAO2 % BLDA: 97 % — HIGH (ref 92–96)
SODIUM SERPL-SCNC: 149 MMOL/L — HIGH (ref 135–145)
WBC # BLD: 8.57 K/UL — SIGNIFICANT CHANGE UP (ref 3.8–10.5)
WBC # FLD AUTO: 8.57 K/UL — SIGNIFICANT CHANGE UP (ref 3.8–10.5)

## 2020-07-19 PROCEDURE — 99233 SBSQ HOSP IP/OBS HIGH 50: CPT

## 2020-07-19 PROCEDURE — 99223 1ST HOSP IP/OBS HIGH 75: CPT | Mod: GC

## 2020-07-19 RX ORDER — HALOPERIDOL DECANOATE 100 MG/ML
1 INJECTION INTRAMUSCULAR ONCE
Refills: 0 | Status: COMPLETED | OUTPATIENT
Start: 2020-07-19 | End: 2020-07-19

## 2020-07-19 RX ORDER — IPRATROPIUM/ALBUTEROL SULFATE 18-103MCG
3 AEROSOL WITH ADAPTER (GRAM) INHALATION
Refills: 0 | Status: DISCONTINUED | OUTPATIENT
Start: 2020-07-19 | End: 2020-07-28

## 2020-07-19 RX ORDER — MIDODRINE HYDROCHLORIDE 2.5 MG/1
10 TABLET ORAL EVERY 8 HOURS
Refills: 0 | Status: DISCONTINUED | OUTPATIENT
Start: 2020-07-19 | End: 2020-07-20

## 2020-07-19 RX ORDER — LANOLIN ALCOHOL/MO/W.PET/CERES
6 CREAM (GRAM) TOPICAL AT BEDTIME
Refills: 0 | Status: DISCONTINUED | OUTPATIENT
Start: 2020-07-19 | End: 2020-07-19

## 2020-07-19 RX ADMIN — HALOPERIDOL DECANOATE 1 MILLIGRAM(S): 100 INJECTION INTRAMUSCULAR at 19:47

## 2020-07-19 RX ADMIN — QUETIAPINE FUMARATE 25 MILLIGRAM(S): 200 TABLET, FILM COATED ORAL at 06:45

## 2020-07-19 RX ADMIN — RIVAROXABAN 20 MILLIGRAM(S): KIT at 12:16

## 2020-07-19 RX ADMIN — QUETIAPINE FUMARATE 25 MILLIGRAM(S): 200 TABLET, FILM COATED ORAL at 17:52

## 2020-07-19 RX ADMIN — HALOPERIDOL DECANOATE 1 MILLIGRAM(S): 100 INJECTION INTRAMUSCULAR at 21:30

## 2020-07-19 RX ADMIN — HALOPERIDOL DECANOATE 1 MILLIGRAM(S): 100 INJECTION INTRAMUSCULAR at 08:32

## 2020-07-19 RX ADMIN — CHLORHEXIDINE GLUCONATE 1 APPLICATION(S): 213 SOLUTION TOPICAL at 06:45

## 2020-07-19 RX ADMIN — PANTOPRAZOLE SODIUM 40 MILLIGRAM(S): 20 TABLET, DELAYED RELEASE ORAL at 12:15

## 2020-07-19 RX ADMIN — MIDODRINE HYDROCHLORIDE 20 MILLIGRAM(S): 2.5 TABLET ORAL at 02:21

## 2020-07-19 RX ADMIN — MIDODRINE HYDROCHLORIDE 10 MILLIGRAM(S): 2.5 TABLET ORAL at 13:10

## 2020-07-19 RX ADMIN — CEFTRIAXONE 100 MILLIGRAM(S): 500 INJECTION, POWDER, FOR SOLUTION INTRAMUSCULAR; INTRAVENOUS at 07:37

## 2020-07-19 NOTE — CHART NOTE - NSCHARTNOTEFT_GEN_A_CORE
MAR Accept Note  Transfer to:  medicine  Accepting Attending Physician:  Dr. Vazquez  Assigned Room:  [address] [place] [address]    Patient seen and examined.   Labs and data reviewed.   No findings precluding transfer of service.       HPI/MICU COURSE:   Please refer to MICU transfer note for full details. Briefly, this is a 79 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 Reportedly more lethargic than baseline. CTH on admission revealed C1 displaced fracture. pt was started on CTX and azithromycin for CAP. TTE demonstrated an EF of 25% with severely dilated LV and pulmonary HTN. Pt required MICU admission for ?cardiogenic shock requiring levophed gtt, d/c'd 7/17, currently on midodrine 10mg TID. She was placed on BiPAP for hypercarbic respiratory failure, now weaned to BIPAP QHS.      FOR FOLLOW-UP:      Hiar Philippe  Internal Medicine, PGY3  [facility] [phone]  [facility] [address]  MAR [address] MAR Accept Note  Transfer to:  medicine  Accepting Attending Physician:  Dr. Vazquez  Assigned Room:  [address] [place] [address]    Patient seen and examined.   Labs and data reviewed.   No findings precluding transfer of service.       HPI/MICU COURSE:   Please refer to MICU transfer note for full details. Briefly, this is a 79 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 Reportedly more lethargic than baseline. CTH on admission revealed C1 displaced fracture. pt was started on CTX and azithromycin for CAP. TTE demonstrated an EF of 25% with severely dilated LV and pulmonary HTN. Pt required MICU admission for ?cardiogenic shock requiring levophed gtt, d/c'd 7/17, currently on midodrine 10mg TID. She was placed on BiPAP for hypercarbic respiratory failure, now weaned to BIPAP QHS.      FOR FOLLOW-UP:  - c/w BIPAP QHS for hypercarbic respiratory failure likely 2/2 ADHF  - consider d/c Abx as today day 5 of ?CAP treatment: lung opacities more likely pulm edema   - wean   -     Hira Philippe  Internal Medicine, PGY3  [facility] [phone]  [facility] [address]  MAR [id number] MAR Accept Note  Transfer to:  medicine  Accepting Attending Physician:  Dr. Vazquez  Assigned Room:  [address] [place] [address]    Patient seen and examined.   Labs and data reviewed.   No findings precluding transfer of service.       HPI/MICU COURSE:   Please refer to MICU transfer note for full details. 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 Reportedly more lethargic than baseline. CTH on admission revealed C1 displaced fracture. pt was started on CTX and azithromycin for CAP. TTE demonstrated an EF of 25% with severely dilated LV and pulmonary HTN. Pt required MICU admission for ?cardiogenic shock requiring levophed gtt, d/c'd 7/17, currently on midodrine 10mg TID. She was placed on BiPAP for hypercarbic respiratory failure, now weaned to BIPAP QHS.      FOR FOLLOW-UP:  - c/w BIPAP QHS for hypercarbic respiratory failure likely 2/2 ADHF  - consider d/c Abx as today day 5 of ?CAP treatment: lung opacities more likely pulm edema   - monitor hemodynamics: weaned down to midodrine 10mg TID today  - MRI C-spine as per neurosurgery  - Social work eval for safe discharge: concern that fracture 2/2 poor care by family at home  - FYI: AAOx0, not following commands, unable to interact with  phone throughout admission       Hira Philippe  Internal Medicine, PGY3  [facility] [phone]  [facility] [address]  MAR [id number] MAR Accept Note  Transfer to:  medicine  Accepting Attending Physician:  Dr. Vazquez  Assigned Room:  [address] [place] [address]    Patient seen and examined.   Labs and data reviewed.   No findings precluding transfer of service.       HPI/MICU COURSE:   Please refer to MICU transfer note for full details. 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 Reportedly more lethargic than baseline. CTH on admission revealed C1 displaced fracture. pt was started on CTX and azithromycin for CAP. TTE demonstrated an EF of 25% with severely dilated LV and pulmonary HTN. Pt required MICU admission for ?cardiogenic shock requiring levophed gtt, d/c'd 7/17, currently on midodrine 10mg TID. She was placed on BiPAP for hypercarbic respiratory failure, now weaned to BIPAP QHS.      FOR FOLLOW-UP:  - c/w BIPAP QHS for hypercarbic respiratory failure likely 2/2 ADHF  - consider d/c Abx as today day 5 of ?CAP treatment: lung opacities more likely pulm edema   - monitor hemodynamics: weaned down to midodrine 10mg TID today  - MRI C-spine as per neurosurgery  - Social work eval for safe discharge: concern that fracture is 2/2 poor care by family at home  - FYI: AAOx0, not following commands, unable to interact with  phone throughout admission       Hira Philippe  Internal Medicine, PGY3  [facility] [phone]  [facility] [address]  MAR [address]

## 2020-07-19 NOTE — PROGRESS NOTE ADULT - SUBJECTIVE AND OBJECTIVE BOX
Dereck Lewis MD  PGY 1 Department of Internal Medicine    Patient is a 80y old  Female who presents with a chief complaint of AMS (17 Jul 2020 07:24)    SUBJECTIVE / OVERNIGHT EVENTS: Pt seen and examined. No acute overnight events.     MEDICATIONS  (STANDING):  cefTRIAXone   IVPB 1000 milliGRAM(s) IV Intermittent every 24 hours  chlorhexidine 4% Liquid 1 Application(s) Topical <User Schedule>  dextrose 5%. 1000 milliLiter(s) (50 mL/Hr) IV Continuous <Continuous>  dextrose 50% Injectable 12.5 Gram(s) IV Push once  dextrose 50% Injectable 25 Gram(s) IV Push once  dextrose 50% Injectable 25 Gram(s) IV Push once  insulin lispro (HumaLOG) corrective regimen sliding scale   SubCutaneous every 6 hours  midodrine 20 milliGRAM(s) Oral every 8 hours  pantoprazole  Injectable 40 milliGRAM(s) IV Push daily  QUEtiapine 25 milliGRAM(s) Oral two times a day  rivaroxaban 20 milliGRAM(s) Oral daily    MEDICATIONS  (PRN):  dextrose 40% Gel 15 Gram(s) Oral once PRN Blood Glucose LESS THAN 70 milliGRAM(s)/deciLiter  glucagon  Injectable 1 milliGRAM(s) IntraMuscular once PRN Glucose <70 milliGRAM(s)/deciLiter  haloperidol    Injectable 1 milliGRAM(s) IV Push every 6 hours PRN agitation      07-18 @ 07:01  -  07-19 @ 07:00  --------------------------------------------------------  IN: 825 mL / OUT: 635 mL / NET: 190 mL    Vital Signs Last 24 Hrs  T(C): 36.5 (07-19-20 @ 08:00), Max: 36.8 (07-18-20 @ 23:00)  T(F): 97.7 (07-19-20 @ 08:00), Max: 98.2 (07-18-20 @ 23:00)  HR: 71 (07-19-20 @ 08:00) (66 - 102)  BP: 107/65 (07-19-20 @ 08:00) (90/54 - 135/86)  BP(mean): 79 (07-19-20 @ 08:00) (67 - 100)  RR: 25 (07-19-20 @ 08:00) (18 - 47)  SpO2: 100% (07-19-20 @ 08:00) (86% - 100%)    CAPILLARY BLOOD GLUCOSE      POCT Blood Glucose.: 169 mg/dL (19 Jul 2020 06:35)  POCT Blood Glucose.: 175 mg/dL (19 Jul 2020 00:42)  POCT Blood Glucose.: 98 mg/dL (18 Jul 2020 17:12)  POCT Blood Glucose.: 80 mg/dL (18 Jul 2020 11:02)    PHYSICAL EXAM:  CONSTITUTIONAL: No acute distress. Awake  HEAD: No evidence of trauma. Structures WNL.  EYES: No scleral icterus.   ENT: Moist oral mucosa.   NECK: C collar in place  RESPIRATORY: Vented breath sounds. No accessory muscle use. No apparent respiratory distress.  CARDIOVASCULAR: +S1/S2. No audible S3/S4. Regular rate and rhythm. No murmurs, rubs, or gallops.   GASTROINTESTINAL: Soft, nontender, nondistended. +BS. No rebound or guarding.   EXTREMITY: No LE swelling or edema. EXTs warm to touch.  DERMATOLOGICAL: No abnormal rashes or lesions.  NEUROLOGICAL: Agitated    The Labs were reviewed by me   The Radiology was reviewed by me    EKG tracing reviewed by me    07-19    149<H>  |  105  |  35<H>  ----------------------------<  185<H>  4.1   |  31  |  1.27  07-18    149<H>  |  104  |  27<H>  ----------------------------<  94  3.5   |  28  |  1.23  07-17    148<H>  |  102  |  25<H>  ----------------------------<  130<H>  3.8   |  24  |  1.00    Ca    9.7      19 Jul 2020 00:42  Ca    9.1      18 Jul 2020 00:48  Ca    9.3      17 Jul 2020 13:56  Phos  4.6     07-19  Mg     1.9     07-19    TPro  6.6  /  Alb  3.0<L>  /  TBili  0.2  /  DBili  x   /  AST  24  /  ALT  14  /  AlkPhos  94  07-17    Magnesium, Serum: 1.9 mg/dL (07-19-20 @ 00:42)  Magnesium, Serum: 1.8 mg/dL (07-18-20 @ 00:48)  Magnesium, Serum: 1.9 mg/dL (07-17-20 @ 13:56)  Magnesium, Serum: 1.4 mg/dL (07-17-20 @ 00:27)    Phosphorus Level, Serum: 4.6 mg/dL (07-19-20 @ 00:42)  Phosphorus Level, Serum: 4.7 mg/dL (07-18-20 @ 00:48)  Phosphorus Level, Serum: 3.4 mg/dL (07-17-20 @ 13:56)  Phosphorus Level, Serum: 4.3 mg/dL (07-17-20 @ 00:27)    PT/INR - ( 19 Jul 2020 00:42 )   PT: 18.5 sec;   INR: 1.59 ratio      PTT - ( 19 Jul 2020 00:42 )  PTT:30.2 sec                                    10.6   8.57  )-----------( 238      ( 19 Jul 2020 05:41 )             37.8                         9.8    8.31  )-----------( 233      ( 18 Jul 2020 00:48 )             33.9                         10.6   11.35 )-----------( 272      ( 17 Jul 2020 00:27 )             36.7     CAPILLARY BLOOD GLUCOSE      POCT Blood Glucose.: 169 mg/dL (19 Jul 2020 06:35)  POCT Blood Glucose.: 175 mg/dL (19 Jul 2020 00:42)  POCT Blood Glucose.: 98 mg/dL (18 Jul 2020 17:12)  POCT Blood Glucose.: 80 mg/dL (18 Jul 2020 11:02)

## 2020-07-19 NOTE — PROGRESS NOTE ADULT - ATTENDING COMMENTS
80F PMHx of  blindness, COPD (unknown level O2 at home), PE ON A/C, CHF, schizoaffective disorder brought in by relative due to altered mental status found to be in hypoxemic and hypercapnic respiratory failure. Pt now tolerating NC but would cont NIV at night due to significant hypercapnea. Can cont TF but not while on NIV. Cont c-collar for c1 fx. f/u NSG reccs. Complete abx course. COnt a/c for PE. Cont prn haldol and standing seroquel for icu delirium. Plan for transfer.

## 2020-07-19 NOTE — CHART NOTE - NSCHARTNOTEFT_GEN_A_CORE
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 Reportedly more lethargic than normal(at baseline requires assistance with ADLs). CTH on admission revealed C1 displaced fracture. pt was started on CTX and azithromycin for CAP. TTE demonstrated an EF of 25% with severely dilated LV and pulmonary HTN. Pt required MICU admission for ?cardiogenic shock requiring levophed gtt, d/c'd 7/17, currently on midodrine 10mg TID. She was placed on BiPAP for hypercarbic respiratory failure, now weaned to BIPAP QHS.      PMHx/PSHx:   PAST MEDICAL & SURGICAL HISTORY:  COPD (chronic obstructive pulmonary disease)  Hypertension  Schizoaffective disorder  Diabetes mellitus  Congestive heart failure  H/O abdominal surgery: Unspecified      Social Hx:     Allergies: Allergies    No Known Allergies    Intolerances        Medications Standing   MEDICATIONS  (STANDING):  cefTRIAXone   IVPB 1000 milliGRAM(s) IV Intermittent every 24 hours  dextrose 5%. 1000 milliLiter(s) (50 mL/Hr) IV Continuous <Continuous>  dextrose 50% Injectable 12.5 Gram(s) IV Push once  dextrose 50% Injectable 25 Gram(s) IV Push once  dextrose 50% Injectable 25 Gram(s) IV Push once  insulin lispro (HumaLOG) corrective regimen sliding scale   SubCutaneous every 6 hours  midodrine 10 milliGRAM(s) Oral every 8 hours  pantoprazole  Injectable 40 milliGRAM(s) IV Push daily  QUEtiapine 25 milliGRAM(s) Oral two times a day  rivaroxaban 20 milliGRAM(s) Oral daily      Medications PRN   MEDICATIONS  (PRN):  dextrose 40% Gel 15 Gram(s) Oral once PRN Blood Glucose LESS THAN 70 milliGRAM(s)/deciLiter  glucagon  Injectable 1 milliGRAM(s) IntraMuscular once PRN Glucose <70 milliGRAM(s)/deciLiter  haloperidol    Injectable 1 milliGRAM(s) IV Push every 6 hours PRN agitation      Physical Exam:   General: Not responsive to voice. Moving all four limps spontaneously. Withdraws to painful stimuli  HEENT: eyelashes are crusted together with yellow discharge, ng tube in place, cervical collar in place, No JVD noted  CVS: RRR, loud s1  Resp: bl crackles in posterior lung fields, no wheezes  Abd: soft, NT, ND, exoriations on lower abdomen covered with a dressing  : lyons draining clear urine  Ext: moving all extremities spontaneously, no lower extremity edema    LABS   CBC                       10.6   8.57  )-----------( 238      ( 19 Jul 2020 05:41 )             37.8     CMP 07-19    149<H>  |  105  |  35<H>  ----------------------------<  185<H>  4.1   |  31  |  1.27    Ca    9.7      19 Jul 2020 00:42  Phos  4.6     07-19  Mg     1.9     07-19      PT/INR - ( 19 Jul 2020 00:42 )   PT: 18.5 sec;   INR: 1.59 ratio         PTT - ( 19 Jul 2020 00:42 )  PTT:30.2 sec      < end of copied text >        Assessment: 81 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 Initially presenting with shock, acidosis, hypercapnia, ct with bl patchy opacities suspicious for pneumonia vs chf. Elevated proBNP. Normal UA. CT head with c1 fracture.     Plan:  #AMS unclear etiology  -CT head without signs of hemorrhage, infarct or mass effect  -CT chest with patchy opacities concerning for pneumonia vs chf  -Possibly due to hypercapnia, pCO2 70s-80s vs underlying infectious process vs ICU delirium  -maintain normal sleep wake cycle, minimize the use of restraints, frequent reorientation    #Respiratory Failure  -elevated pCO2, possibly due to pneumonia vs COPD, vs less likely CHF given hypercapnia  -BiPAP qhs, strict i's and o's  -AM VBGs  -s/p ceftriaxone 5 days for CAP  -hold lasix in setting of hypernatremia and no overt signs of volume overload    #MARIAN  -unclear baseline, 1.27 today  -trend bmp    #hypernatremia  -sodium 149, 2.4L free water deficit  -free water bolus 250 q6    #c spine fx  -neuro surgery recommend c collar, no intervention at this time, get MRI cspine when medically stable    #schizoaffective disorder  -seroquel 25mg     #hx of PE/DVT  -rivaroxaban Patient is a 80y old  Female who presents with a chief complaint of AMS      HPI:  81 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 Reportedly more lethargic than normal(at baseline requires assistance with ADLs). CTH on admission revealed C1 displaced fracture. pt was started on CTX and azithromycin for CAP. TTE demonstrated an EF of 25% with severely dilated LV and pulmonary HTN. Pt required MICU admission for ?cardiogenic shock requiring levophed gtt, d/c'd 7/17, currently on midodrine 10mg TID. She was placed on BiPAP for hypercarbic respiratory failure, now weaned to BIPAP QHS.      PMHx/PSHx:   PAST MEDICAL & SURGICAL HISTORY:  COPD (chronic obstructive pulmonary disease)  Hypertension  Schizoaffective disorder  Diabetes mellitus  Congestive heart failure  H/O abdominal surgery: Unspecified      Social Hx:     Allergies: Allergies    No Known Allergies    Intolerances        Medications Standing   MEDICATIONS  (STANDING):  cefTRIAXone   IVPB 1000 milliGRAM(s) IV Intermittent every 24 hours  dextrose 5%. 1000 milliLiter(s) (50 mL/Hr) IV Continuous <Continuous>  dextrose 50% Injectable 12.5 Gram(s) IV Push once  dextrose 50% Injectable 25 Gram(s) IV Push once  dextrose 50% Injectable 25 Gram(s) IV Push once  insulin lispro (HumaLOG) corrective regimen sliding scale   SubCutaneous every 6 hours  midodrine 10 milliGRAM(s) Oral every 8 hours  pantoprazole  Injectable 40 milliGRAM(s) IV Push daily  QUEtiapine 25 milliGRAM(s) Oral two times a day  rivaroxaban 20 milliGRAM(s) Oral daily      Medications PRN   MEDICATIONS  (PRN):  dextrose 40% Gel 15 Gram(s) Oral once PRN Blood Glucose LESS THAN 70 milliGRAM(s)/deciLiter  glucagon  Injectable 1 milliGRAM(s) IntraMuscular once PRN Glucose <70 milliGRAM(s)/deciLiter  haloperidol    Injectable 1 milliGRAM(s) IV Push every 6 hours PRN agitation      Physical Exam:   General: Not responsive to voice. Moving all four limps spontaneously. Withdraws to painful stimuli  HEENT: eyelashes are crusted together with yellow discharge, ng tube in place, cervical collar in place, No JVD noted  CVS: RRR, loud s1  Resp: bl crackles in posterior lung fields, no wheezes  Abd: soft, NT, ND, exoriations on lower abdomen covered with a dressing  : lyons draining clear urine  Ext: moving all extremities spontaneously, no lower extremity edema    LABS   CBC                       10.6   8.57  )-----------( 238      ( 19 Jul 2020 05:41 )             37.8     CMP 07-19    149<H>  |  105  |  35<H>  ----------------------------<  185<H>  4.1   |  31  |  1.27    Ca    9.7      19 Jul 2020 00:42  Phos  4.6     07-19  Mg     1.9     07-19      PT/INR - ( 19 Jul 2020 00:42 )   PT: 18.5 sec;   INR: 1.59 ratio         PTT - ( 19 Jul 2020 00:42 )  PTT:30.2 sec      < end of copied text >        Assessment: 81 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 Initially presenting with shock, acidosis, hypercapnia, ct with bl patchy opacities suspicious for pneumonia vs chf. Elevated proBNP. Normal UA. CT head with c1 fracture.     Plan:  #AMS unclear etiology  -CT head without signs of hemorrhage, infarct or mass effect  -CT chest with patchy opacities concerning for pneumonia vs chf  -Possibly due to hypercapnia, pCO2 70s-80s vs underlying infectious process vs ICU delirium  -maintain normal sleep wake cycle, minimize the use of restraints, frequent reorientation    #Respiratory Failure  -elevated pCO2, possibly due to pneumonia vs COPD, vs less likely CHF given hypercapnia  -BiPAP qhs, strict i's and o's  -AM VBGs  -s/p ceftriaxone 5 days for CAP  -hold lasix in setting of hypernatremia and no overt signs of volume overload    #hypotension  -midodrine 10mg TID    #MARIAN  -unclear baseline, 1.27 today  -trend bmp    #hypernatremia  -sodium 149, 2.4L free water deficit  -free water bolus 250 q6    #c spine fx  -neuro surgery recommend c collar, no intervention at this time, get MRI cspine when medically stable    #schizoaffective disorder  -seroquel 25mg     #DM  -ISS    #hx of PE/DVT  -rivaroxaban Patient is a 80y old  Female who presents with a chief complaint of AMS      HPI:  81 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 Reportedly more lethargic than normal(at baseline requires assistance with ADLs) since earlier in the day of admission. Additionally, per the son, she was having difficulty ambulating, wasn't speaking properly and felt sick but the son could not explain exactly what the issue was but denied localizing symptoms such as fevers, chills, nausea, vomiting or dysuria. CTH on admission revealed C1 displaced fracture. pt was started on CTX and azithromycin for CAP. TTE demonstrated an EF of 25% with severely dilated LV and pulmonary HTN. Pt required MICU admission for ?cardiogenic shock requiring levophed gtt, d/c'd 7/17, currently on midodrine 10mg TID. She was placed on BiPAP for hypercarbic respiratory failure, now weaned to BIPAP QHS.    REVIEW OF SYSTEMS:    Unable to obtain full review of systems at this time due to patient clinical status but per son over the phone on 7/19-patient was lethargic on day of presentation with difficulty ambulating and speaking but denied fevers, chills, nausea or vomiting at the day of arrival to the ED.      PMHx/PSHx:   PAST MEDICAL & SURGICAL HISTORY:  COPD (chronic obstructive pulmonary disease)  Hypertension  Schizoaffective disorder  Diabetes mellitus  Congestive heart failure  H/O abdominal surgery: Unspecified      Social Hx: Son denied alcohol, tobacco or drug use    Family Hx: Son endorsed family history of "mental issues" but no illnesses    No Known Allergies    Intolerances        Medications Standing   MEDICATIONS  (STANDING):  cefTRIAXone   IVPB 1000 milliGRAM(s) IV Intermittent every 24 hours  dextrose 5%. 1000 milliLiter(s) (50 mL/Hr) IV Continuous <Continuous>  dextrose 50% Injectable 12.5 Gram(s) IV Push once  dextrose 50% Injectable 25 Gram(s) IV Push once  dextrose 50% Injectable 25 Gram(s) IV Push once  insulin lispro (HumaLOG) corrective regimen sliding scale   SubCutaneous every 6 hours  midodrine 10 milliGRAM(s) Oral every 8 hours  pantoprazole  Injectable 40 milliGRAM(s) IV Push daily  QUEtiapine 25 milliGRAM(s) Oral two times a day  rivaroxaban 20 milliGRAM(s) Oral daily      Medications PRN   MEDICATIONS  (PRN):  dextrose 40% Gel 15 Gram(s) Oral once PRN Blood Glucose LESS THAN 70 milliGRAM(s)/deciLiter  glucagon  Injectable 1 milliGRAM(s) IntraMuscular once PRN Glucose <70 milliGRAM(s)/deciLiter  haloperidol    Injectable 1 milliGRAM(s) IV Push every 6 hours PRN agitation      Physical Exam:   General: Moans when her name is yelled, Moving all four limbs spontaneously.  HEENT: eyelashes are crusted together with yellow discharge, ng tube in place, cervical collar in place, No JVD noted  CVS: RRR, loud s1  Resp: bl crackles in posterior lung fields, no wheezes  Abd: soft, NT, ND, excoriations on lower abdomen covered with a dressing  : lyons draining clear urine, no suprapubic tenderness  Ext: moving all extremities spontaneously, no lower extremity edema  Skin: No rashes or lesions  Neuro: Withdraws to painful stimuli, unable to determine orientation      LABS   CBC                       10.6   8.57  )-----------( 238      ( 19 Jul 2020 05:41 )             37.8     CMP 07-19    149<H>  |  105  |  35<H>  ----------------------------<  185<H>  4.1   |  31  |  1.27    Ca    9.7      19 Jul 2020 00:42  Phos  4.6     07-19  Mg     1.9     07-19      PT/INR - ( 19 Jul 2020 00:42 )   PT: 18.5 sec;   INR: 1.59 ratio         PTT - ( 19 Jul 2020 00:42 )  PTT:30.2 sec      < end of copied text >        Assessment: 81 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 Initially presenting with shock, acidosis, hypercapnia, ct with bl patchy opacities suspicious for pneumonia vs chf. Elevated proBNP. Normal UA. CT head with c1 fracture.     Plan:  #AMS unclear etiology  -CT head without signs of hemorrhage, infarct or mass effect  -CT chest with patchy opacities concerning for pneumonia vs chf  -Possibly due to hypercapnia, pCO2 70s-80s vs underlying infectious process vs ICU delirium  -maintain normal sleep wake cycle, minimize the use of restraints, frequent reorientation    #Respiratory Failure  -elevated pCO2, possibly due to pneumonia vs COPD, vs less likely CHF given hypercapnia  -BiPAP qhs, strict i's and o's  -AM VBGs  -s/p ceftriaxone 5 days for CAP  -hold lasix in setting of hypernatremia and no overt signs of volume overload    #hypotension  -midodrine 10mg TID    #MARIAN  -unclear baseline, 1.27 today  -trend bmp    #hypernatremia  -sodium 149, 2.4L free water deficit  -free water bolus 250 q6    #c spine fx  -neuro surgery recommend c collar, no intervention at this time, get MRI cspine when medically stable    #schizoaffective disorder  -seroquel 25mg     #DM  -ISS    #hx of PE/DVT  -rivaroxaban    Attending Attestation:  Briefly, this is an 80 year old female with history of COPD, schizoaffective disorder, PE Patient is a 80y old  Female who presents with a chief complaint of AMS      HPI:  81 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 Reportedly more lethargic than normal(at baseline requires assistance with ADLs) since earlier in the day of admission. Additionally, per the son, she was having difficulty ambulating, wasn't speaking properly and felt sick but the son could not explain exactly what the issue was but denied localizing symptoms such as fevers, chills, nausea, vomiting or dysuria. CTH on admission revealed C1 displaced fracture. pt was started on CTX and azithromycin for CAP. TTE demonstrated an EF of 25% with severely dilated LV and pulmonary HTN. Pt required MICU admission for ?cardiogenic shock requiring levophed gtt, d/c'd 7/17, currently on midodrine 10mg TID. She was placed on BiPAP for hypercarbic respiratory failure, now weaned to BIPAP QHS.    REVIEW OF SYSTEMS:    Unable to obtain full review of systems at this time due to patient clinical status but per son over the phone on 7/19-patient was lethargic on day of presentation with difficulty ambulating and speaking but denied fevers, chills, nausea or vomiting at the day of arrival to the ED.      PMHx/PSHx:   PAST MEDICAL & SURGICAL HISTORY:  COPD (chronic obstructive pulmonary disease)  Hypertension  Schizoaffective disorder  Diabetes mellitus  Congestive heart failure  H/O abdominal surgery: Unspecified      Social Hx: Son denied alcohol, tobacco or drug use    Family Hx: Son endorsed family history of "mental issues" but no illnesses    No Known Allergies    Intolerances        Medications Standing   MEDICATIONS  (STANDING):  cefTRIAXone   IVPB 1000 milliGRAM(s) IV Intermittent every 24 hours  dextrose 5%. 1000 milliLiter(s) (50 mL/Hr) IV Continuous <Continuous>  dextrose 50% Injectable 12.5 Gram(s) IV Push once  dextrose 50% Injectable 25 Gram(s) IV Push once  dextrose 50% Injectable 25 Gram(s) IV Push once  insulin lispro (HumaLOG) corrective regimen sliding scale   SubCutaneous every 6 hours  midodrine 10 milliGRAM(s) Oral every 8 hours  pantoprazole  Injectable 40 milliGRAM(s) IV Push daily  QUEtiapine 25 milliGRAM(s) Oral two times a day  rivaroxaban 20 milliGRAM(s) Oral daily      Medications PRN   MEDICATIONS  (PRN):  dextrose 40% Gel 15 Gram(s) Oral once PRN Blood Glucose LESS THAN 70 milliGRAM(s)/deciLiter  glucagon  Injectable 1 milliGRAM(s) IntraMuscular once PRN Glucose <70 milliGRAM(s)/deciLiter  haloperidol    Injectable 1 milliGRAM(s) IV Push every 6 hours PRN agitation      Physical Exam:   General: Moans when her name is yelled, Moving all four limbs spontaneously.  HEENT: eyelashes are crusted together with yellow discharge, ng tube in place, cervical collar in place, No JVD noted  CVS: RRR, loud s1  Resp: bl crackles in posterior lung fields, no wheezes  Abd: soft, NT, ND, excoriations on lower abdomen covered with a dressing  : lyons draining clear urine, no suprapubic tenderness  Ext: moving all extremities spontaneously, no lower extremity edema  Skin: No rashes or lesions  Neuro: Withdraws to painful stimuli, unable to determine orientation      LABS   CBC                       10.6   8.57  )-----------( 238      ( 19 Jul 2020 05:41 )             37.8     CMP 07-19    149<H>  |  105  |  35<H>  ----------------------------<  185<H>  4.1   |  31  |  1.27    Ca    9.7      19 Jul 2020 00:42  Phos  4.6     07-19  Mg     1.9     07-19      PT/INR - ( 19 Jul 2020 00:42 )   PT: 18.5 sec;   INR: 1.59 ratio         PTT - ( 19 Jul 2020 00:42 )  PTT:30.2 sec      < end of copied text >        Assessment: 81 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 Initially presenting with shock, acidosis, hypercapnia, ct with bl patchy opacities suspicious for pneumonia vs chf. Elevated proBNP. Normal UA. CT head with c1 fracture.     Plan:  #AMS unclear etiology  -CT head without signs of hemorrhage, infarct or mass effect  -CT chest with patchy opacities concerning for pneumonia vs chf  -Possibly due to hypercapnia, pCO2 70s-80s vs underlying infectious process vs ICU delirium  -maintain normal sleep wake cycle, minimize the use of restraints, frequent reorientation    #Respiratory Failure  -elevated pCO2, possibly due to pneumonia vs COPD, vs less likely CHF given hypercapnia  -BiPAP qhs, strict i's and o's  -AM VBGs  -s/p ceftriaxone 5 days for CAP  -hold lasix in setting of hypernatremia and no overt signs of volume overload    #hypotension  -midodrine 10mg TID    #MARIAN  -unclear baseline, 1.27 today  -trend bmp    #hypernatremia  -sodium 149, 2.4L free water deficit  -free water bolus 250 q6    #c spine fx  -neuro surgery recommend c collar, no intervention at this time, get MRI cspine when medically stable    #schizoaffective disorder  -seroquel 25mg     #DM  -ISS    #hx of PE/DVT  -rivaroxaban    Attending Attestation:  Briefly, this is an 80 year old female with history of COPD, schizoaffective disorder, PE, DM, and CHF with reduced EF who presented initially with altered mental status. I spoke with her son Miguel Angel Alanis this afternoon and he provided some history. He states that his mother recently saw her PCP Dr. Wolf Colvin and he told her she was anemic which apparently is new for her. At baseline, she needs help with her ADLs and her mental status seems to wax and wane however, there are things that she does not know at baseline such as her birth date because she never went to school and is illiterate. On the day of presentation, the patient was having difficulty ambulating, was not speaking properly and "felt sick" but he was unable to provide more history than that. She was admitted to the ICU for hypercapnic/hypoxic respiratory failure and shock requiring pressor support. She received midodrine and was weaned off of pressors, diuresed for her CHF and treated with ceftriaxone for CAP. Additionally, her CT head showed C1 compression fracture for which she has a cervical collar and neurosurgery is following. She was weaned from continuous nasal Bipap (can't tolerate mask due to C collar) to Bipap at night. The patient was stepped down today with a CO2 of 76 which is the highest it has been since admission, blood gas should be repeated and she may require more Bipap. Continue as needed diuresis as the patient has soft BPs. Additionally, Dr. Wolf Colvin should be reached out to for collateral information. Restart home medications as tolerated. If the patient's mental status does not improve, consider reducing sedating medications such as seroquel and haldol or neurology consult. Rest of plan as above.    Marino Williamissar, DO  Pager [phone]  If no answer [phone]

## 2020-07-19 NOTE — PROGRESS NOTE ADULT - ASSESSMENT
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    #Neuro  **AMS  - Intermittently agitated, reportedly A&Ox1 at baseline  - On Home med Seroquel 25 mg    **Schizoaffective disorder  - As above    #Respiratory  **Hypercarbic respiratory failure, in the setting of most likely a CHF exacerbation  - On admission, O2 sat 84% on RA, improved with BIPAP  - Initial VBG 7.17/80/32/28, improved to 7.2/64/22/24.   - Last ABG on 7/17 pH 7.28, pCO2 67, pO2 89, HCO3 24  - Continue on 2 L NC AM and BiPAP PM  - Trend ABGs    **Pulmonary edema, in the setting of suspected CHF exacerbation  - CXR demonstrating b/l patchy opacities indicating pulm edema; b/l pleural effusions  - CT demonstrating ground glass opacities b/l  - Hold lasix. Monitor ins & outs.    **PNA, low suspicion  - COVID negative x 2  - s/p zosyn x 1  - On ceftriaxone Day 4, if community-acquired.   - Legionella negative  - F/u RVP    #Cardiovascular  **CHF exacerbation, suspected  - Admission EKG demonstrating NSR with LAD/nonspecific T wave abnormalities  - BMP [id number]  - Hold lasix. Monitor I&Os.  - TTE demonstrated EF: 25%, severely dilated ventricle, and pulmonary HTN    #GI  - No acute issues  - Pantoprazole prophylaxis - 40 IV daily    #/Renal  **MARIAN  - On admission, Cr 1.32, baseline unknown (patient has no other records aside from this admission)  - Current Cr 1.23  - Trend BMP    #Endocrine  - Type 2 DM  - on insulin sliding scale, POCT glucose: 80, HbA1c: 6.4  - No acute issues  - T3 51, awaiting TSH Ab and T4    #MSK  - CT spine:  Age-indeterminate, displaced left C1 anterior and posterior arch fracture as described, which may be acute given mild prevertebral soft tissue stranding/edema and no significant sclerosis at the fracture margin. Question mildly increased attenuation surrounding the thecal sac in the spinal canal. Extra-axial/epidural hematoma cannot be excluded. MRI is recommended for further evaluation of ligamentous injury and extra-axial hematoma.  Nonspecific bilateral mastoid opacification. Recommend clinical correlation to assess acute mastoiditis.  - Neurosx saw pt recs no acute neurosx intervention, continue C-collar and MRI C-spine when medically stable    #ID  **PNA  - COVID negative x 2  - On ceftriaxone, likely community-acquired  - Legionella negative, off azithromycin  - UA unremarkable  - Bcx: initial: Staph. 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    #Neuro  **AMS  - Intermittently agitated, reportedly A&Ox1 at baseline  - On Home med Seroquel 25 mg  - s/p 1 mg haldol today for agitation    **Schizoaffective disorder  - As above    #Respiratory  **Hypercarbic respiratory failure, in the setting of most likely a CHF exacerbation. On admission, O2 sat 84% on RA, improved with BIPAP. Initial VBG 7.17/80/32/28, improved to 7.2/64/22/24.   - Last ABG 7/19 pH 7.33, pCO2 76, pO2 99, HCO3 39  - Continue on 2 L NC. Held   - Trend ABGs    **Pulmonary edema, in the setting of suspected CHF exacerbation  - CXR demonstrating b/l patchy opacities indicating pulm edema; b/l pleural effusions  - CT demonstrating ground glass opacities b/l  - Hold lasix. Monitor ins & outs.    **PNA, low suspicion  - COVID negative x 2  - s/p zosyn x 1  - On ceftriaxone Day 5, if community-acquired.   - Legionella negative  - F/u RVP    #Cardiovascular  **CHF exacerbation, suspected  - Admission EKG demonstrating NSR with LAD/nonspecific T wave abnormalities  - BMP [id number] 7/14  - Hold lasix. Monitor I&Os.  - TTE demonstrated EF: 25%, severely dilated ventricle, and pulmonary HTN    #GI  - No acute issues  - Pantoprazole prophylaxis - 40 IV daily    #/Renal  **MARIAN  - On admission, Cr 1.32, baseline unknown (patient has no other records aside from this admission)  - Current Cr 1.27  - Trend BMP    Hypernatremia- stable (Na 149)  - Free water bolus 250 cc q6h   - Monitor BMP q6h    #Endocrine  - Type 2 DM  - on insulin sliding scale, POCT glucose: 80, HbA1c: 6.4  - No acute issues  - T3 51, f/u TSH Ab and T4    #MSK  - CT spine:  Age-indeterminate, displaced left C1 anterior and posterior arch fracture as described, which may be acute given mild prevertebral soft tissue stranding/edema and no significant sclerosis at the fracture margin. Question mildly increased attenuation surrounding the thecal sac in the spinal canal. Extra-axial/epidural hematoma cannot be excluded. MRI is recommended for further evaluation of ligamentous injury and extra-axial hematoma.  Nonspecific bilateral mastoid opacification. Recommend clinical correlation to assess acute mastoiditis.  - Neurosx saw pt recs no acute neurosx intervention, continue C-collar and MRI C-spine when medically stable    #ID- stable  **PNA  - COVID negative x 2  - On ceftriaxone, likely community-acquired  - Legionella negative, off azithromycin  - UA unremarkable  - Bcx: initial: Staph. epidermidis, 2nd: no growth, urine cx: no growth    #Heme/Onc  - Hgb 10.6 (baseline unknown)  - Trend CBC    #Diet  - C/w tubal feedings (Vital AF)    #DVT Prophylaxis  - xarelto     #GOC  - DNR/DNI 81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    #Neuro  **AMS  - Intermittently agitated, reportedly A&Ox1 at baseline  - On Home med Seroquel 25 mg  - s/p 1 mg haldol today for agitation    **Schizoaffective disorder  - As above    #Respiratory  **Hypercarbic respiratory failure, in the setting of most likely a CHF exacerbation. On admission, O2 sat 84% on RA, improved with BIPAP. Initial VBG 7.17/80/32/28, improved to 7.2/64/22/24.   - Last ABG 7/19 pH 7.33, pCO2 76, pO2 99, HCO3 39  - Continue on 2 L NC. Held   - Trend ABGs    **Pulmonary edema, in the setting of suspected CHF exacerbation  - CXR demonstrating b/l patchy opacities indicating pulm edema; b/l pleural effusions  - CT demonstrating ground glass opacities b/l  - Hold lasix. Monitor ins & outs.    **PNA, low suspicion  - COVID negative x 2  - s/p zosyn x 1  - On ceftriaxone Day 5, if community-acquired.   - Legionella negative  - F/u RVP    #Cardiovascular  **CHF exacerbation, suspected  - Admission EKG demonstrating NSR with LAD/nonspecific T wave abnormalities  - BMP [id number] 7/14  - Hold lasix. Monitor I&Os.  - TTE demonstrated EF: 25%, severely dilated ventricle, and pulmonary HTN    #GI  - No acute issues  - Pantoprazole prophylaxis - 40 IV daily    #/Renal  **MARIAN  - On admission, Cr 1.32, baseline unknown (patient has no other records aside from this admission)  - Current Cr 1.27  - Trend BMP    Hypernatremia- stable (Na 149)  - Free water bolus 250 cc q6h   - Monitor BMP q6h    #Endocrine  - Type 2 DM  - on insulin sliding scale, POCT glucose: 80, HbA1c: 6.4  - No acute issues  - T3 51, f/u TSH Ab and T4    #MSK  - CT spine:  Age-indeterminate, displaced left C1 anterior and posterior arch fracture as described, which may be acute given mild prevertebral soft tissue stranding/edema and no significant sclerosis at the fracture margin. Question mildly increased attenuation surrounding the thecal sac in the spinal canal. Extra-axial/epidural hematoma cannot be excluded. MRI is recommended for further evaluation of ligamentous injury and extra-axial hematoma.  Nonspecific bilateral mastoid opacification. Recommend clinical correlation to assess acute mastoiditis.  - Neurosx saw pt recs no acute neurosx intervention, continue C-collar and MRI C-spine when medically stable    #ID- stable  **PNA  - COVID negative x 2  - On ceftriaxone, likely community-acquired  - Legionella negative, off azithromycin  - UA unremarkable  - Bcx: initial: Staph. epidermidis, 2nd: no growth, urine cx: no growth    #Heme/Onc  - Hgb 10.6 (baseline unknown)  - Trend CBC    #Diet  - C/w tubal feedings (Vital AF)    #DVT Prophylaxis  - xarelto given h/o multiple PE    #GOC  - DNR/DNI

## 2020-07-20 DIAGNOSIS — S12.031A NONDISPLACED POSTERIOR ARCH FRACTURE OF FIRST CERVICAL VERTEBRA, INITIAL ENCOUNTER FOR CLOSED FRACTURE: ICD-10-CM

## 2020-07-20 DIAGNOSIS — E11.9 TYPE 2 DIABETES MELLITUS WITHOUT COMPLICATIONS: ICD-10-CM

## 2020-07-20 DIAGNOSIS — F25.9 SCHIZOAFFECTIVE DISORDER, UNSPECIFIED: ICD-10-CM

## 2020-07-20 DIAGNOSIS — E63.8 OTHER SPECIFIED NUTRITIONAL DEFICIENCIES: ICD-10-CM

## 2020-07-20 DIAGNOSIS — R41.82 ALTERED MENTAL STATUS, UNSPECIFIED: ICD-10-CM

## 2020-07-20 DIAGNOSIS — J96.90 RESPIRATORY FAILURE, UNSPECIFIED, UNSPECIFIED WHETHER WITH HYPOXIA OR HYPERCAPNIA: ICD-10-CM

## 2020-07-20 DIAGNOSIS — I95.9 HYPOTENSION, UNSPECIFIED: ICD-10-CM

## 2020-07-20 DIAGNOSIS — E87.0 HYPEROSMOLALITY AND HYPERNATREMIA: ICD-10-CM

## 2020-07-20 DIAGNOSIS — I82.409 ACUTE EMBOLISM AND THROMBOSIS OF UNSPECIFIED DEEP VEINS OF UNSPECIFIED LOWER EXTREMITY: ICD-10-CM

## 2020-07-20 LAB
ANION GAP SERPL CALC-SCNC: 9 MMOL/L — SIGNIFICANT CHANGE UP (ref 5–17)
APTT BLD: 34.1 SEC — SIGNIFICANT CHANGE UP (ref 27.5–35.5)
BUN SERPL-MCNC: 34 MG/DL — HIGH (ref 7–23)
CALCIUM SERPL-MCNC: 9.6 MG/DL — SIGNIFICANT CHANGE UP (ref 8.4–10.5)
CHLORIDE SERPL-SCNC: 106 MMOL/L — SIGNIFICANT CHANGE UP (ref 96–108)
CO2 SERPL-SCNC: 37 MMOL/L — HIGH (ref 22–31)
CREAT SERPL-MCNC: 0.91 MG/DL — SIGNIFICANT CHANGE UP (ref 0.5–1.3)
CULTURE RESULTS: SIGNIFICANT CHANGE UP
GAS PNL BLDA: SIGNIFICANT CHANGE UP
GLUCOSE BLDC GLUCOMTR-MCNC: 140 MG/DL — HIGH (ref 70–99)
GLUCOSE BLDC GLUCOMTR-MCNC: 142 MG/DL — HIGH (ref 70–99)
GLUCOSE BLDC GLUCOMTR-MCNC: 147 MG/DL — HIGH (ref 70–99)
GLUCOSE SERPL-MCNC: 124 MG/DL — HIGH (ref 70–99)
HCT VFR BLD CALC: 36.8 % — SIGNIFICANT CHANGE UP (ref 34.5–45)
HGB BLD-MCNC: 10.3 G/DL — LOW (ref 11.5–15.5)
INR BLD: 1.19 RATIO — HIGH (ref 0.88–1.16)
MAGNESIUM SERPL-MCNC: 2 MG/DL — SIGNIFICANT CHANGE UP (ref 1.6–2.6)
MCHC RBC-ENTMCNC: 24.3 PG — LOW (ref 27–34)
MCHC RBC-ENTMCNC: 28 GM/DL — LOW (ref 32–36)
MCV RBC AUTO: 87 FL — SIGNIFICANT CHANGE UP (ref 80–100)
NRBC # BLD: 0 /100 WBCS — SIGNIFICANT CHANGE UP (ref 0–0)
PHOSPHATE SERPL-MCNC: 2.1 MG/DL — LOW (ref 2.5–4.5)
PLATELET # BLD AUTO: 212 K/UL — SIGNIFICANT CHANGE UP (ref 150–400)
POTASSIUM SERPL-MCNC: 4.3 MMOL/L — SIGNIFICANT CHANGE UP (ref 3.5–5.3)
POTASSIUM SERPL-SCNC: 4.3 MMOL/L — SIGNIFICANT CHANGE UP (ref 3.5–5.3)
PROTHROM AB SERPL-ACNC: 14.1 SEC — HIGH (ref 10.6–13.6)
RBC # BLD: 4.23 M/UL — SIGNIFICANT CHANGE UP (ref 3.8–5.2)
RBC # FLD: 16.4 % — HIGH (ref 10.3–14.5)
SODIUM SERPL-SCNC: 152 MMOL/L — HIGH (ref 135–145)
SPECIMEN SOURCE: SIGNIFICANT CHANGE UP
WBC # BLD: 7.28 K/UL — SIGNIFICANT CHANGE UP (ref 3.8–10.5)
WBC # FLD AUTO: 7.28 K/UL — SIGNIFICANT CHANGE UP (ref 3.8–10.5)

## 2020-07-20 PROCEDURE — 99233 SBSQ HOSP IP/OBS HIGH 50: CPT | Mod: GC

## 2020-07-20 RX ORDER — ENOXAPARIN SODIUM 100 MG/ML
40 INJECTION SUBCUTANEOUS DAILY
Refills: 0 | Status: DISCONTINUED | OUTPATIENT
Start: 2020-07-20 | End: 2020-07-20

## 2020-07-20 RX ORDER — SODIUM CHLORIDE 9 MG/ML
1000 INJECTION, SOLUTION INTRAVENOUS
Refills: 0 | Status: COMPLETED | OUTPATIENT
Start: 2020-07-20 | End: 2020-07-20

## 2020-07-20 RX ORDER — BUDESONIDE AND FORMOTEROL FUMARATE DIHYDRATE 160; 4.5 UG/1; UG/1
2 AEROSOL RESPIRATORY (INHALATION)
Refills: 0 | Status: DISCONTINUED | OUTPATIENT
Start: 2020-07-20 | End: 2020-07-28

## 2020-07-20 RX ORDER — ACETAMINOPHEN 500 MG
1000 TABLET ORAL ONCE
Refills: 0 | Status: COMPLETED | OUTPATIENT
Start: 2020-07-20 | End: 2020-07-20

## 2020-07-20 RX ADMIN — SODIUM CHLORIDE 100 MILLILITER(S): 9 INJECTION, SOLUTION INTRAVENOUS at 16:35

## 2020-07-20 RX ADMIN — Medication 1000 MILLIGRAM(S): at 13:50

## 2020-07-20 RX ADMIN — Medication 85 MILLIMOLE(S): at 09:16

## 2020-07-20 RX ADMIN — CEFTRIAXONE 100 MILLIGRAM(S): 500 INJECTION, POWDER, FOR SOLUTION INTRAMUSCULAR; INTRAVENOUS at 08:41

## 2020-07-20 RX ADMIN — BUDESONIDE AND FORMOTEROL FUMARATE DIHYDRATE 2 PUFF(S): 160; 4.5 AEROSOL RESPIRATORY (INHALATION) at 18:46

## 2020-07-20 RX ADMIN — ENOXAPARIN SODIUM 40 MILLIGRAM(S): 100 INJECTION SUBCUTANEOUS at 13:19

## 2020-07-20 RX ADMIN — PANTOPRAZOLE SODIUM 40 MILLIGRAM(S): 20 TABLET, DELAYED RELEASE ORAL at 13:19

## 2020-07-20 RX ADMIN — HALOPERIDOL DECANOATE 1 MILLIGRAM(S): 100 INJECTION INTRAMUSCULAR at 19:04

## 2020-07-20 RX ADMIN — Medication 400 MILLIGRAM(S): at 13:19

## 2020-07-20 NOTE — PHYSICAL THERAPY INITIAL EVALUATION ADULT - TRANSFER TRAINING, PT EVAL
GOAL: Pt will perform ALL transfers with mod assist, w/use of appropriate assistive device as needed, in 2 weeks.

## 2020-07-20 NOTE — PROGRESS NOTE ADULT - PROBLEM SELECTOR PLAN 6
-Was able to eat with her hands at home prior to hospitalization  -NG tube placed in the MICU, dislodged by the patient on 7/20  -Speech and swallow evaluation to determine suitability for PO Dysphagia compounded by encephalopathy  -Was able to eat with her hands at home prior to hospitalization  -NG tube placed in the MICU, dislodged by the patient on 7/20  -Speech and swallow evaluation to determine suitability for PO

## 2020-07-20 NOTE — PROVIDER CONTACT NOTE (CRITICAL VALUE NOTIFICATION) - ACTION/TREATMENT ORDERED:
MD made aware, assessed pt at bedside w/ RN. MD increased O2 to 6 LPM => SaO2 99%. Further interventions to be determined. Continue to monitor.

## 2020-07-20 NOTE — PHYSICAL THERAPY INITIAL EVALUATION ADULT - ADDITIONAL COMMENTS
CT BRAIN: No acute intracranial hemorrhage, large cortical infarct or mass effect. Somewhat eccentric lucency in the right cerebellum, which be due to artifact although infarct cannot be excluded.If clinically indicated, short-term follow-up or MRI may be obtained for further evaluation. Partially visualized, age-indeterminate displaced fracture of the left C1 anterior and posterior arch. Asymmetry of the lateral atlantodental space. Recommend comparison to prior outside study. Dedicated cervical spine imaging would be helpful for further evaluation of fracture/ligamentous injury.

## 2020-07-20 NOTE — PROGRESS NOTE ADULT - ATTENDING COMMENTS
Patient seen and examined with the housestaff team during rounds on [address] [place] this morning.  History, labs, chart reviewed.    On my assessment, patient appears to be grimacing in bed, with C-collar in place. She moans/grunts to verbal and tactile stimuli. Eye exam notable for likely enucleated orbits, although tough to assess as patient refusing exam. Moving all four extremities. Lungs CTA anteriorly b/l. Abdomen soft, non-tender. NG tube removed by patient earlier this morning.    Labs notable for hypernatremia. Elevated pCO2 appears compensated with elevated bicarb level and normal pH. PaO2 level from this morning possibly mixed with venous blood; O2 sat this morning appropriate on nasal cannula.    1. Community Acquired PNA: Continue CTX 1g IV daily, with plan to finish 7 day course. MRSA PCR is negative. Patient is s/p azithromycin. Continue O2 via nasal cannula during daytime.  2. Acute on chronic hypercarbic/hypoxic respiratory failure/COPD: Continue BIPAP qhs and O2 via NC during daytime, with goal O2 sat 94%.  3. Likely chronic systolic CHF: Severe dysfunction of LV noted on TTE. Breathing comfortably supine at this time with stable O2 sats. Continue to hold diuretics in setting of hypernatremia. Add beta blocker once able to take PO medications. Monitor Is and Os. Start symbicort BID in lieu of elipta (home medication). Albuterol PRN.  4. Metabolic Encephalopathy: Unclear baseline functional status. Continue management of PNA/hypernatremia as described. Limit sedatives/narcotics as able. Attempt to maintain sleep-wake cycle. Haloperidol PRN. Monitor QTc.  5. Schizoaffective Disorder: Will resume seroquel when able to tolerate PO medications. Haldol PRN.  6. Hypernatremia: Hypotonic infusion today as patient is NPO without NG tube. Trend BMP.  7. Dysphagia. Will hold off on replacing NG tube today, pending S+S evaluation. If she does not pass, will have to discuss risks/benefits of replacing NG tube with patient's family. In setting of encephalopathy, I believe she may be at risk for self-removing tube once again. Aspiration precautions.  8. C1 Fracture: CT report reviewed. Cannot rule out epidural hematoma. Will hold off on systemic anticoagulation at this time and attempt to obtain MR of C-spine for further classification. Continue C-collar. NSx f/u.  9. Hx of PE: Holding xarelto in setting of NPO, awaiting MRI C-spine. If no evidence for epidural collection, will resume once able to tolerate PO.  10. Goals of Care: Patient with multiple advanced medical issues and symptoms. DNR/DNI. Would benefit from palliative care evaluation. Will need to discuss with family.

## 2020-07-20 NOTE — PATIENT PROFILE ADULT - NSPRESCRALCFREQ_GEN_A_NUR
Unable to obtain information from patient secondary to cognitive and communication limitations. No family present at bedside

## 2020-07-20 NOTE — PROGRESS NOTE ADULT - ASSESSMENT
Assessment: 81 y/o Farsi speaking female with pmhx of CHF (EF 25%), PE, COPD, DM2, and schizoaffective disorder p/w AMS from home. Initially presenting with shock, acidosis, hypercapnia, CT with bl patchy opacities suspicious for pneumonia vs CHF. Elevated proBNP. Normal UA. CT head with c1 fracture and no acute intracranial pathology. Patient stepped up to the MICU due to need for pressure support and ventilatory support. Now stepped down to floors

## 2020-07-20 NOTE — CHART NOTE - NSCHARTNOTEFT_GEN_A_CORE
Spoke with patient's son Miguel Angel about patient's condition. Miguel Angel states he would prefer if the team does not replace the NG tube as he feels it is causing his mother distress. Miguel Angel states he will visit soon and revisit the discussion. Also discussed the referral to the palliative care team for a goals of care discussion.

## 2020-07-20 NOTE — SWALLOW BEDSIDE ASSESSMENT ADULT - COMMENTS
-CXR - Bilateral patchy opacities may represent multifocal pneumonia or pulmonary edema. Bilateral pleural effusions.   -CTH - No acute intracranial hemorrhage, large cortical infarct or mass effect. Partially visualized, age-indeterminate displaced fracture of the left C1 anterior and posterior arch.   -CT cervical spine - Age-indeterminate, displaced left C1 anterior and posterior arch fracture as described, which may be acute given mild prevertebral soft tissue stranding/edema and no significant sclerosis at the fracture margin.   -CT Chest + ABD/pelvis - Small bilateral pleural effusion with atelectasis. Cardiomegaly. Nonspecific interlobular septal thickening and ground glass/patchy opacities in both lungs, which can be seen in pulmonary edema. Abdominal wall hernias as described. Recommend clinical correlation to assess urinary tract infection. Nonspecific stranding in the right lateral abdominal wall soft tissue, which may represent edema and/or cellulitis.   ENT: Audible gurgling. +C-collar.  COVID negative x2. Due to the patient going into hypoxemic and hypercarbic respiratory failure 2/2 to CHF exacerbation, she was given 100% 2L NC, no improvement so was placed on nasal BiPAP (given her C-collar from her C1 fracture). Appreciated RD notes.     Dysphagia history: Pt is new to this service. As per MD notation, pt eating finger foods at home, per conversation w. son. Call placed to son this date, however he was in a meeting and SLP not able to gain any additional information.

## 2020-07-20 NOTE — PHYSICAL THERAPY INITIAL EVALUATION ADULT - PLANNED THERAPY INTERVENTIONS, PT EVAL
Alert-The patient is alert, awake and responds to voice. The patient is oriented to time, place, and person. The triage nurse is able to obtain subjective information.
gait training/balance training/transfer training/bed mobility training

## 2020-07-20 NOTE — PHYSICAL THERAPY INITIAL EVALUATION ADULT - CRITERIA FOR SKILLED THERAPEUTIC INTERVENTIONS
anticipated discharge recommendation/therapy frequency/predicted duration of therapy intervention/functional limitations in following categories/impairments found

## 2020-07-20 NOTE — PHYSICAL THERAPY INITIAL EVALUATION ADULT - PRECAUTIONS/LIMITATIONS, REHAB EVAL
CT C/S: Age-indeterminate, displaced left C1 anterior and posterior arch fracture as described, which may be acute given mild prevertebral soft tissue stranding/edema and no significant sclerosis at the fracture margin. Question mildly increased attenuation surrounding the thecal sac in the spinal canal. Extra-axial/epidural hematoma cannot be excluded. MRI is recommended for further evaluation of ligamentous injury and extra-axial hematoma.Nonspecific bilateral mastoid opacification. Recommend clinical correlation to assess acute mastoiditis. CT ABDOMEN/PELVIS: Small bilateral pleural effusion with atelectasis. Cardiomegaly. Nonspecific interlobular septal thickening and groundglass/patchy opacities in both lungs, which can be seen in pulmonaryedema. Recommend clinical correlation to assess underlying pneumonia.No bowel obstruction. Abdominal wall hernias as described. Nonspecific mild bilateral perinephric stranding without hydroureteronephrosis. Diffuse prominent bladder wall with nonspecific perivesical stranding. Recommend clinical correlation to assess urinary tract infection. Nonspecific stranding in the right lateral abdominal wall soft tissue, which may represent edema and/or cellulitis. Recommend direct visualization./fall precautions

## 2020-07-20 NOTE — PATIENT PROFILE ADULT - LANGUAGE ASSISTANCE NEEDED
Unable to use  phone for information from patient secondary to cognitive and communication limitations. No family present at bedside

## 2020-07-20 NOTE — PROGRESS NOTE ADULT - PROBLEM SELECTOR PLAN 1
Unclear etiology. Possibly secondary to worsening respiratory failure and chronic hypercarbia vs underlying infectious process  -CT head without evidence of bleeding, stroke or mass  -Normal UA and Ucx, Bcx without growth  -PCO2 consistently elevated 60s-70s, No other clear metabolic derangements  -Minimize restraints, frequent reorientation, maintain day/night wake/sleep cycle Unclear etiology. Possibly metabolic encephalopathy secondary to worsening respiratory failure and chronic hypercarbia vs underlying infectious process. Compounded by likely underlying dementia, schizoaffective disorder, and blindness.  -CT head without evidence of bleeding, stroke or mass  -Normal UA and Ucx, Bcx without growth  -PCO2 consistently elevated 60s-70s, No other clear metabolic derangements  -Treatment of PNA as below  -Minimize restraints, frequent reorientation, maintain day/night wake/sleep cycle  -Resume low dose seroquel when able to tolerate PO  -Haloperidol 1mg IV q6h PRN agitation  -Minimize use of restraints as able

## 2020-07-20 NOTE — PROGRESS NOTE ADULT - PROBLEM SELECTOR PLAN 4
-Sodium 152  -Free water deficit 2-3L  -D5W at 100ml/h -Sodium 152  -Free water deficit 2-3L  -D5W at 100ml/h  -Trend BMP

## 2020-07-20 NOTE — PROGRESS NOTE ADULT - SUBJECTIVE AND OBJECTIVE BOX
Elías Martinez MD PGY1  Pager [phone]    Patient is a 80y old  Female who presents with a chief complaint of AMS (19 Jul 2020 08:44)      SUBJECTIVE/OVERNIGHT EVENTS: Patient removed her ng tube and had to be placed in soft wrist restraints. Spoke with patient's son Miguel Angel who reports that patient lived at home with him and was able to ambulate with assistance and could feed herself with her hands when given food. He reports that the patient was aware of the people around her and could answer questions. Over the week prior to admission the patient seemed confused and was weaker than usual    12 point review of symptoms negative except as above    MEDICATIONS  (STANDING):  cefTRIAXone   IVPB 1000 milliGRAM(s) IV Intermittent every 24 hours  dextrose 5%. 1000 milliLiter(s) (50 mL/Hr) IV Continuous <Continuous>  dextrose 5%. 1000 milliLiter(s) (100 mL/Hr) IV Continuous <Continuous>  dextrose 50% Injectable 12.5 Gram(s) IV Push once  dextrose 50% Injectable 25 Gram(s) IV Push once  dextrose 50% Injectable 25 Gram(s) IV Push once  enoxaparin Injectable 40 milliGRAM(s) SubCutaneous daily  insulin lispro (HumaLOG) corrective regimen sliding scale   SubCutaneous every 6 hours  pantoprazole  Injectable 40 milliGRAM(s) IV Push daily    MEDICATIONS  (PRN):  albuterol/ipratropium for Nebulization. 3 milliLiter(s) Nebulizer four times a day PRN Shortness of Breath and/or Wheezing  dextrose 40% Gel 15 Gram(s) Oral once PRN Blood Glucose LESS THAN 70 milliGRAM(s)/deciLiter  glucagon  Injectable 1 milliGRAM(s) IntraMuscular once PRN Glucose <70 milliGRAM(s)/deciLiter  haloperidol    Injectable 1 milliGRAM(s) IV Push every 6 hours PRN agitation    CAPILLARY BLOOD GLUCOSE      POCT Blood Glucose.: 147 mg/dL (20 Jul 2020 12:09)  POCT Blood Glucose.: 140 mg/dL (20 Jul 2020 05:14)  POCT Blood Glucose.: 136 mg/dL (19 Jul 2020 23:58)  POCT Blood Glucose.: 155 mg/dL (19 Jul 2020 17:48)    I&O's Summary    19 Jul 2020 07:01  -  20 Jul 2020 07:00  --------------------------------------------------------  IN: 610 mL / OUT: 975 mL / NET: -365 mL    20 Jul 2020 07:01  -  20 Jul 2020 14:08  --------------------------------------------------------  IN: 0 mL / OUT: 400 mL / NET: -400 mL          Vital Signs Last 24 Hrs  T(C): 36.7 (20 Jul 2020 13:31), Max: 36.8 (19 Jul 2020 15:00)  T(F): 98.1 (20 Jul 2020 13:31), Max: 98.2 (19 Jul 2020 15:00)  HR: 75 (20 Jul 2020 13:31) (75 - 102)  BP: 135/76 (20 Jul 2020 13:31) (100/56 - 141/75)  BP(mean): 69 (19 Jul 2020 15:00) (69 - 69)  RR: 20 (20 Jul 2020 13:31) (16 - 20)  SpO2: 98% (20 Jul 2020 13:31) (94% - 99%)    PHYSICAL EXAM:  GENERAL: Elderly female sleeping, occasionally grimacing  HEENT:  enucleated orbits bilaterally, dry cracked oral mucosa  NECK: rigid cervical collar in place, No JVD  CHEST/LUNG: crackles and wheezes bl  HEART: Regular rate and rhythm; No murmurs, rubs, or gallops  ABDOMEN: Soft, Nontender, Nondistended; Bowel sounds present, excoriations on lower abdomen  EXTREMITIES:  2+ Peripheral Pulses, No clubbing, cyanosis, or edema  PSYCH: no psychomotor agitation  NEUROLOGY: arousable to voice, moves all extremities spontaneously  SKIN: No rashes or lesions    LABS                        10.3   7.28  )-----------( 212      ( 20 Jul 2020 07:13 )             36.8                         10.6   8.57  )-----------( 238      ( 19 Jul 2020 05:41 )             37.8     07-20    152<H>  |  106  |  34<H>  ----------------------------<  124<H>  4.3   |  37<H>  |  0.91  07-19    149<H>  |  105  |  35<H>  ----------------------------<  185<H>  4.1   |  31  |  1.27    Ca    9.6      20 Jul 2020 07:13  Ca    9.7      19 Jul 2020 00:42  Phos  2.1     07-20  Mg     2.0     07-20      PT/INR - ( 20 Jul 2020 07:13 )   PT: 14.1 sec;   INR: 1.19 ratio         PTT - ( 20 Jul 2020 07:13 )  PTT:34.1 sec   15 Jul 2020 07:04 Troponin x     / CK 27 U/L / CKMB x     / CKMB Units 2.3 ng/mL          ( 20 Jul 2020 06:17 ) pH: 7.40  /  pCO2: 67    /  pO2: 47    / HCO3: 40    / Base Excess: 13.3  /  SaO2: 84              ( 19 Jul 2020 13:10 ) pH: 7.33  /  pCO2: 76    /  pO2: 99    / HCO3: 39    / Base Excess: 10.8  /  SaO2: 97              ( 17 Jul 2020 00:22 ) pH: 7.28  /  pCO2: 67    /  pO2: 89    / HCO3: 30    / Base Excess: 2.8   /  SaO2: 95                    RADIOLOGY & ADDITIONAL TESTS: Elías Martinez MD PGY1  Pager [phone]    Patient is a 80y old  Female who presents with a chief complaint of AMS (19 Jul 2020 08:44)      SUBJECTIVE/OVERNIGHT EVENTS: Patient removed her ng tube and had to be placed in soft wrist restraints. Spoke with patient's son Miguel Angel who reports that patient lived at home with him and was able to ambulate with assistance and could feed herself with her hands when given food. He reports that the patient was aware of the people around her and could answer questions. Over the week prior to admission the patient seemed confused and was weaker than usual    12 point review of symptoms negative except as above    MEDICATIONS  (STANDING):  cefTRIAXone   IVPB 1000 milliGRAM(s) IV Intermittent every 24 hours  dextrose 5%. 1000 milliLiter(s) (50 mL/Hr) IV Continuous <Continuous>  dextrose 5%. 1000 milliLiter(s) (100 mL/Hr) IV Continuous <Continuous>  dextrose 50% Injectable 12.5 Gram(s) IV Push once  dextrose 50% Injectable 25 Gram(s) IV Push once  dextrose 50% Injectable 25 Gram(s) IV Push once  enoxaparin Injectable 40 milliGRAM(s) SubCutaneous daily  insulin lispro (HumaLOG) corrective regimen sliding scale   SubCutaneous every 6 hours  pantoprazole  Injectable 40 milliGRAM(s) IV Push daily    MEDICATIONS  (PRN):  albuterol/ipratropium for Nebulization. 3 milliLiter(s) Nebulizer four times a day PRN Shortness of Breath and/or Wheezing  dextrose 40% Gel 15 Gram(s) Oral once PRN Blood Glucose LESS THAN 70 milliGRAM(s)/deciLiter  glucagon  Injectable 1 milliGRAM(s) IntraMuscular once PRN Glucose <70 milliGRAM(s)/deciLiter  haloperidol    Injectable 1 milliGRAM(s) IV Push every 6 hours PRN agitation    CAPILLARY BLOOD GLUCOSE      POCT Blood Glucose.: 147 mg/dL (20 Jul 2020 12:09)  POCT Blood Glucose.: 140 mg/dL (20 Jul 2020 05:14)  POCT Blood Glucose.: 136 mg/dL (19 Jul 2020 23:58)  POCT Blood Glucose.: 155 mg/dL (19 Jul 2020 17:48)    I&O's Summary    19 Jul 2020 07:01  -  20 Jul 2020 07:00  --------------------------------------------------------  IN: 610 mL / OUT: 975 mL / NET: -365 mL    20 Jul 2020 07:01  -  20 Jul 2020 14:08  --------------------------------------------------------  IN: 0 mL / OUT: 400 mL / NET: -400 mL          Vital Signs Last 24 Hrs  T(C): 36.7 (20 Jul 2020 13:31), Max: 36.8 (19 Jul 2020 15:00)  T(F): 98.1 (20 Jul 2020 13:31), Max: 98.2 (19 Jul 2020 15:00)  HR: 75 (20 Jul 2020 13:31) (75 - 102)  BP: 135/76 (20 Jul 2020 13:31) (100/56 - 141/75)  BP(mean): 69 (19 Jul 2020 15:00) (69 - 69)  RR: 20 (20 Jul 2020 13:31) (16 - 20)  SpO2: 98% (20 Jul 2020 13:31) (94% - 99%)    PHYSICAL EXAM:  GENERAL: Elderly female sleeping, occasionally grimacing  HEENT:  enucleated orbits bilaterally, dry cracked oral mucosa  NECK: rigid cervical collar in place, No JVD  CHEST/LUNG: crackles and wheezes bl  HEART: Regular rate and rhythm; No murmurs, rubs, or gallops  ABDOMEN: Soft, Nontender, Nondistended; Bowel sounds present, excoriations on lower abdomen  EXTREMITIES:  2+ Peripheral Pulses, No clubbing, cyanosis, or edema  PSYCH: no psychomotor agitation  NEUROLOGY: arousable to voice, moves all extremities spontaneously  SKIN: No rashes or lesions    LABS                        10.3   7.28  )-----------( 212      ( 20 Jul 2020 07:13 )             36.8                         10.6   8.57  )-----------( 238      ( 19 Jul 2020 05:41 )             37.8     07-20    152<H>  |  106  |  34<H>  ----------------------------<  124<H>  4.3   |  37<H>  |  0.91  07-19    149<H>  |  105  |  35<H>  ----------------------------<  185<H>  4.1   |  31  |  1.27    Ca    9.6      20 Jul 2020 07:13  Ca    9.7      19 Jul 2020 00:42  Phos  2.1     07-20  Mg     2.0     07-20      PT/INR - ( 20 Jul 2020 07:13 )   PT: 14.1 sec;   INR: 1.19 ratio         PTT - ( 20 Jul 2020 07:13 )  PTT:34.1 sec   15 Jul 2020 07:04 Troponin x     / CK 27 U/L / CKMB x     / CKMB Units 2.3 ng/mL          ( 20 Jul 2020 06:17 ) pH: 7.40  /  pCO2: 67    /  pO2: 47    / HCO3: 40    / Base Excess: 13.3  /  SaO2: 84              ( 19 Jul 2020 13:10 ) pH: 7.33  /  pCO2: 76    /  pO2: 99    / HCO3: 39    / Base Excess: 10.8  /  SaO2: 97              ( 17 Jul 2020 00:22 ) pH: 7.28  /  pCO2: 67    /  pO2: 89    / HCO3: 30    / Base Excess: 2.8   /  SaO2: 95        RADIOLOGY & ADDITIONAL TESTS: No new studies

## 2020-07-20 NOTE — PATIENT PROFILE ADULT - OVER THE PAST TWO WEEKS, HAVE YOU FELT LITTLE INTEREST OR PLEASURE IN DOING THINGS?
no/Unable to obtain information from patient secondary to cognitive and communication limitations. No family present at bedside

## 2020-07-20 NOTE — PATIENT PROFILE ADULT - INFORMATION PROVIDED TO:
Unable to educate patient secondary to cognitive and communication limitations. No family present at bedside

## 2020-07-20 NOTE — PHYSICAL THERAPY INITIAL EVALUATION ADULT - PERTINENT HX OF CURRENT PROBLEM, REHAB EVAL
80F PMHx COPD, DM, schizoaffective, CHF, brought to ED by son for lethargy & AMS, found to be hypercarbic and septic. CTH on admission revealed C1 displaced fracture, +c-collar. Pt was started on CTX and azithromycin for CAP. TTE demonstrated an EF of 25% with severely dilated LV and pulmonary HTN. Pt required MICU admission for ?cardiogenic shock.

## 2020-07-20 NOTE — PROGRESS NOTE ADULT - PROBLEM SELECTOR PLAN 8
-C1 fracture seen on CT head  -Evaluated by neuro surgery- no need for intervention at this time.  -Neuro surgery recommends rigid cervical collar and MRI cervical spine

## 2020-07-20 NOTE — PROGRESS NOTE ADULT - PROBLEM SELECTOR PLAN 5
-Now off pressors  -BP stable at present. Will trend and evaluate the need for pressors -Now off pressors  -BP stable at present. Will trend and evaluate the need for pressors  -Hold midodrine

## 2020-07-20 NOTE — PROGRESS NOTE ADULT - PROBLEM SELECTOR PLAN 3
-Was on seroquel 25mg PO BID at home  -Held at present pending plan for resuming PO medications -Was on seroquel 25mg PO BID at home  -Held at present pending plan for resuming PO medications  -Continue haloperidol IV PRN with EKG monitoring for QTc prolongation

## 2020-07-20 NOTE — PHYSICAL THERAPY INITIAL EVALUATION ADULT - MANUAL MUSCLE TESTING RESULTS, REHAB EVAL
grossly assessed due to/BUE grossly at least 2/5 throughout ; difficult to access due to decreased ability to follow commands

## 2020-07-20 NOTE — PATIENT PROFILE ADULT - FUNCTIONAL SCREEN CURRENT LEVEL: COMMUNICATION, MLM
Unable to communicate w/ patient secondary to cognitive and communication limitations. No family present at bedside

## 2020-07-20 NOTE — PROGRESS NOTE ADULT - PROBLEM SELECTOR PLAN 2
-CT chest with diffuse ground glass opacity  -Unclear if atypical pneumonia or pulmonary edema.  -Day 6 of ceftriaxone empiric antibiotic coverage for possible pneumonia  -BiPAP at night  -Strict Is and Os Likely acute on chronic hypoxic and hypercarbic respiratory failure  -CT chest with diffuse ground glass opacity  -Unclear if atypical pneumonia or pulmonary edema.  -Day 6 of ceftriaxone empiric antibiotic coverage for possible pneumonia. Plan for 7 day course in total.  -BiPAP at night, nasal cannula during the day, with goal O2 sat 94%.  -Strict Is and Os

## 2020-07-21 ENCOUNTER — TRANSCRIPTION ENCOUNTER (OUTPATIENT)
Age: 80
End: 2020-07-21

## 2020-07-21 DIAGNOSIS — J18.9 PNEUMONIA, UNSPECIFIED ORGANISM: ICD-10-CM

## 2020-07-21 DIAGNOSIS — I50.23 ACUTE ON CHRONIC SYSTOLIC (CONGESTIVE) HEART FAILURE: ICD-10-CM

## 2020-07-21 DIAGNOSIS — F25.9 SCHIZOAFFECTIVE DISORDER, UNSPECIFIED: ICD-10-CM

## 2020-07-21 DIAGNOSIS — R13.10 DYSPHAGIA, UNSPECIFIED: ICD-10-CM

## 2020-07-21 DIAGNOSIS — I26.99 OTHER PULMONARY EMBOLISM WITHOUT ACUTE COR PULMONALE: ICD-10-CM

## 2020-07-21 DIAGNOSIS — Z71.89 OTHER SPECIFIED COUNSELING: ICD-10-CM

## 2020-07-21 DIAGNOSIS — J96.22 ACUTE AND CHRONIC RESPIRATORY FAILURE WITH HYPERCAPNIA: ICD-10-CM

## 2020-07-21 DIAGNOSIS — G93.41 METABOLIC ENCEPHALOPATHY: ICD-10-CM

## 2020-07-21 LAB
ANION GAP SERPL CALC-SCNC: 8 MMOL/L — SIGNIFICANT CHANGE UP (ref 5–17)
BUN SERPL-MCNC: 25 MG/DL — HIGH (ref 7–23)
CALCIUM SERPL-MCNC: 9.4 MG/DL — SIGNIFICANT CHANGE UP (ref 8.4–10.5)
CHLORIDE SERPL-SCNC: 102 MMOL/L — SIGNIFICANT CHANGE UP (ref 96–108)
CO2 SERPL-SCNC: 37 MMOL/L — HIGH (ref 22–31)
CREAT SERPL-MCNC: 0.75 MG/DL — SIGNIFICANT CHANGE UP (ref 0.5–1.3)
CULTURE RESULTS: SIGNIFICANT CHANGE UP
CULTURE RESULTS: SIGNIFICANT CHANGE UP
GLUCOSE BLDC GLUCOMTR-MCNC: 113 MG/DL — HIGH (ref 70–99)
GLUCOSE BLDC GLUCOMTR-MCNC: 127 MG/DL — HIGH (ref 70–99)
GLUCOSE BLDC GLUCOMTR-MCNC: 130 MG/DL — HIGH (ref 70–99)
GLUCOSE BLDC GLUCOMTR-MCNC: 141 MG/DL — HIGH (ref 70–99)
GLUCOSE SERPL-MCNC: 164 MG/DL — HIGH (ref 70–99)
HCT VFR BLD CALC: 37.9 % — SIGNIFICANT CHANGE UP (ref 34.5–45)
HGB BLD-MCNC: 10.7 G/DL — LOW (ref 11.5–15.5)
MAGNESIUM SERPL-MCNC: 1.7 MG/DL — SIGNIFICANT CHANGE UP (ref 1.6–2.6)
MCHC RBC-ENTMCNC: 24.4 PG — LOW (ref 27–34)
MCHC RBC-ENTMCNC: 28.2 GM/DL — LOW (ref 32–36)
MCV RBC AUTO: 86.5 FL — SIGNIFICANT CHANGE UP (ref 80–100)
NRBC # BLD: 0 /100 WBCS — SIGNIFICANT CHANGE UP (ref 0–0)
PHOSPHATE SERPL-MCNC: 2.4 MG/DL — LOW (ref 2.5–4.5)
PLATELET # BLD AUTO: 186 K/UL — SIGNIFICANT CHANGE UP (ref 150–400)
POTASSIUM SERPL-MCNC: 4.5 MMOL/L — SIGNIFICANT CHANGE UP (ref 3.5–5.3)
POTASSIUM SERPL-SCNC: 4.5 MMOL/L — SIGNIFICANT CHANGE UP (ref 3.5–5.3)
RBC # BLD: 4.38 M/UL — SIGNIFICANT CHANGE UP (ref 3.8–5.2)
RBC # FLD: 15.9 % — HIGH (ref 10.3–14.5)
SODIUM SERPL-SCNC: 147 MMOL/L — HIGH (ref 135–145)
SPECIMEN SOURCE: SIGNIFICANT CHANGE UP
SPECIMEN SOURCE: SIGNIFICANT CHANGE UP
WBC # BLD: 6.53 K/UL — SIGNIFICANT CHANGE UP (ref 3.8–10.5)
WBC # FLD AUTO: 6.53 K/UL — SIGNIFICANT CHANGE UP (ref 3.8–10.5)

## 2020-07-21 PROCEDURE — 99233 SBSQ HOSP IP/OBS HIGH 50: CPT | Mod: GC

## 2020-07-21 PROCEDURE — 72141 MRI NECK SPINE W/O DYE: CPT | Mod: 26

## 2020-07-21 PROCEDURE — 99223 1ST HOSP IP/OBS HIGH 75: CPT | Mod: GC

## 2020-07-21 PROCEDURE — 93010 ELECTROCARDIOGRAM REPORT: CPT

## 2020-07-21 RX ORDER — SODIUM CHLORIDE 9 MG/ML
1000 INJECTION, SOLUTION INTRAVENOUS
Refills: 0 | Status: DISCONTINUED | OUTPATIENT
Start: 2020-07-21 | End: 2020-07-22

## 2020-07-21 RX ADMIN — PANTOPRAZOLE SODIUM 40 MILLIGRAM(S): 20 TABLET, DELAYED RELEASE ORAL at 12:02

## 2020-07-21 RX ADMIN — BUDESONIDE AND FORMOTEROL FUMARATE DIHYDRATE 2 PUFF(S): 160; 4.5 AEROSOL RESPIRATORY (INHALATION) at 17:35

## 2020-07-21 RX ADMIN — HALOPERIDOL DECANOATE 1 MILLIGRAM(S): 100 INJECTION INTRAMUSCULAR at 06:21

## 2020-07-21 RX ADMIN — HALOPERIDOL DECANOATE 1 MILLIGRAM(S): 100 INJECTION INTRAMUSCULAR at 13:01

## 2020-07-21 RX ADMIN — BUDESONIDE AND FORMOTEROL FUMARATE DIHYDRATE 2 PUFF(S): 160; 4.5 AEROSOL RESPIRATORY (INHALATION) at 05:47

## 2020-07-21 RX ADMIN — SODIUM CHLORIDE 100 MILLILITER(S): 9 INJECTION, SOLUTION INTRAVENOUS at 06:25

## 2020-07-21 RX ADMIN — SODIUM CHLORIDE 100 MILLILITER(S): 9 INJECTION, SOLUTION INTRAVENOUS at 08:34

## 2020-07-21 RX ADMIN — CEFTRIAXONE 100 MILLIGRAM(S): 500 INJECTION, POWDER, FOR SOLUTION INTRAMUSCULAR; INTRAVENOUS at 08:32

## 2020-07-21 NOTE — DISCHARGE NOTE PROVIDER - NSDCMRMEDTOKEN_GEN_ALL_CORE_FT
albuterol 0.63 mg/3 mL (0.021%) inhalation solution: inhaled every 6 hours, As Needed  Calcium 600+D oral tablet: 1 tab(s) orally 2 times a day  gemfibrozil 600 mg oral tablet: 1 tab(s) orally 2 times a day  Incruse Ellipta 62.5 mcg/inh inhalation powder: inhaled once a day, As Needed  ipratropium 21 mcg/inh (0.03%) nasal spray: 2 spray(s) nasal 3 times a day, As Needed  Janumet 50 mg-500 mg oral tablet: 1 tab(s) orally 2 times a day  Levemir 100 units/mL subcutaneous solution: 4 unit(s) subcutaneous once a day (at bedtime)  lisinopril 2.5 mg oral tablet: 1 tab(s) orally once a day  Metoprolol Succinate ER 25 mg oral tablet, extended release: 1 tab(s) orally once a day  SEROquel 25 mg oral tablet: 1 tab(s) orally 2 times a day  spironolactone 25 mg oral tablet: 1 tab(s) orally 2 times a day  Vitamin D3 1000 intl units (25 mcg) oral tablet: 1 tab(s) orally once a day  Xarelto 20 mg oral tablet: 1 tab(s) orally once a day (in the evening) albuterol 0.63 mg/3 mL (0.021%) inhalation solution: inhaled every 6 hours, As Needed  Calcium 600+D oral tablet: 1 tab(s) orally 2 times a day  Face Mask: Face Mask Use as Directed for COPD ICD-10 Code: J44. 9  Chronic Obstructive Pulmonary Diseas  gemfibrozil 600 mg oral tablet: 1 tab(s) orally 2 times a day  Incruse Ellipta 62.5 mcg/inh inhalation powder: inhaled once a day, As Needed  ipratropium 21 mcg/inh (0.03%) nasal spray: 2 spray(s) nasal 3 times a day, As Needed  Janumet 50 mg-500 mg oral tablet: 1 tab(s) orally 2 times a day  Levemir 100 units/mL subcutaneous solution: 4 unit(s) subcutaneous once a day (at bedtime)  lisinopril 2.5 mg oral tablet: 1 tab(s) orally once a day  Metoprolol Succinate ER 25 mg oral tablet, extended release: 1 tab(s) orally once a day  SEROquel 25 mg oral tablet: 1 tab(s) orally 2 times a day  spironolactone 25 mg oral tablet: 1 tab(s) orally 2 times a day  Vitamin D3 1000 intl units (25 mcg) oral tablet: 1 tab(s) orally once a day  Xarelto 20 mg oral tablet: 1 tab(s) orally once a day (in the evening) budesonide-formoterol 160 mcg-4.5 mcg/inh inhalation aerosol: 2 puff(s) inhaled every 6 hours   Calcium 600+D oral tablet: 1 tab(s) orally 2 times a day  Face Mask: Face Mask Use as Directed for COPD ICD-10 Code: J44. 9  Chronic Obstructive Pulmonary Diseas  gemfibrozil 600 mg oral tablet: 1 tab(s) orally 2 times a day  Incruse Ellipta 62.5 mcg/inh inhalation powder: inhaled once a day, As Needed  Janumet 50 mg-500 mg oral tablet: 1 tab(s) orally 2 times a day  Levemir 100 units/mL subcutaneous solution: 4 unit(s) subcutaneous once a day (at bedtime)  Metoprolol Succinate ER 25 mg oral tablet, extended release: 1 tab(s) orally once a day  SEROquel 25 mg oral tablet: 1 tab(s) orally 2 times a day  spironolactone 25 mg oral tablet: 1 tab(s) orally 2 times a day  Ventolin HFA 90 mcg/inh inhalation aerosol: 2 puff(s) inhaled every 6 hours   Vitamin D3 1000 intl units (25 mcg) oral tablet: 1 tab(s) orally once a day  Xarelto 20 mg oral tablet: 1 tab(s) orally once a day (in the evening) budesonide-formoterol 160 mcg-4.5 mcg/inh inhalation aerosol: 2 puff(s) inhaled every 6 hours   Calcium 600+D oral tablet: 1 tab(s) orally 2 times a day  Face Mask: Face Mask Use as Directed for COPD ICD-10 Code: J44. 9  Chronic Obstructive Pulmonary Diseas  gemfibrozil 600 mg oral tablet: 1 tab(s) orally 2 times a day  Incruse Ellipta 62.5 mcg/inh inhalation powder: 2 puff(s) inhaled once a day, As Needed   Janumet 50 mg-500 mg oral tablet: 1 tab(s) orally 2 times a day  Levemir 100 units/mL subcutaneous solution: 4 unit(s) subcutaneous once a day (at bedtime)  Metoprolol Succinate ER 25 mg oral tablet, extended release: 1 tab(s) orally once a day  SEROquel 25 mg oral tablet: 1 tab(s) orally 2 times a day  spironolactone 25 mg oral tablet: 1 tab(s) orally 2 times a day  Ventolin HFA 90 mcg/inh inhalation aerosol: 2 puff(s) inhaled every 6 hours   Vitamin D3 1000 intl units (25 mcg) oral tablet: 1 tab(s) orally once a day  Xarelto 20 mg oral tablet: 1 tab(s) orally once a day (in the evening)

## 2020-07-21 NOTE — CONSULT NOTE ADULT - PROBLEM SELECTOR RECOMMENDATION 4
Discussed hospital course and medical treatment with son, Miguel Angel Alanis. Mr Alanis states he has one brother, however this brother has a disability and is incapable of participating in making medical decisions for his mother, therefore the sole decision maker is Miguel Angel Alanis.     Prior to hospitalization, pt was having worsening strength and gradually declining, especially with memory. Even so, Mr Alanis's goal is for his mother is to return home as she was prior to her admission. Mr. Alanis is understanding of his mother's multiple medical comorbidities and that this may not be possible.     Recommendations:  - Continue with current medical management including antibiotics and oxygen supplementation prn.  - Mr Alanis's main concern is his mother receiving nutrition. He is open to ngt however understands that mother has been pulling out tube and she failed speech and swallow evaluation. Avoid restraints.   - Pt may be a candidate for home hospice due to chronic medical conditions, will readdress with family during discharge planning.

## 2020-07-21 NOTE — PROGRESS NOTE ADULT - SUBJECTIVE AND OBJECTIVE BOX
Elías Martinez MD PGY1  Pager [phone]    Patient is a 80y old  Female who presents with a chief complaint of AMS (20 Jul 2020 13:21)      SUBJECTIVE/OVERNIGHT EVENTS: Wrist restraints and lyons removed in PM on 7/20 The patient was unable to tolerate MRI overnight. Received 2 haldol PRNs. Failed TOVx2. Attempted to pull off her cervical collar. On exam this morning is somewhat more alert, responding when her name is called and repositioning herself by rolling slightly to accomodate exam.    12 point review of symptoms negative except as above    MEDICATIONS  (STANDING):  budesonide 160 MICROgram(s)/formoterol 4.5 MICROgram(s) Inhaler 2 Puff(s) Inhalation two times a day  cefTRIAXone   IVPB 1000 milliGRAM(s) IV Intermittent every 24 hours  dextrose 5%. 1000 milliLiter(s) (50 mL/Hr) IV Continuous <Continuous>  dextrose 50% Injectable 12.5 Gram(s) IV Push once  dextrose 50% Injectable 25 Gram(s) IV Push once  dextrose 50% Injectable 25 Gram(s) IV Push once  insulin lispro (HumaLOG) corrective regimen sliding scale   SubCutaneous every 6 hours  pantoprazole  Injectable 40 milliGRAM(s) IV Push daily    MEDICATIONS  (PRN):  albuterol/ipratropium for Nebulization. 3 milliLiter(s) Nebulizer four times a day PRN Shortness of Breath and/or Wheezing  dextrose 40% Gel 15 Gram(s) Oral once PRN Blood Glucose LESS THAN 70 milliGRAM(s)/deciLiter  glucagon  Injectable 1 milliGRAM(s) IntraMuscular once PRN Glucose <70 milliGRAM(s)/deciLiter  haloperidol    Injectable 1 milliGRAM(s) IV Push every 6 hours PRN agitation    CAPILLARY BLOOD GLUCOSE      POCT Blood Glucose.: 141 mg/dL (21 Jul 2020 06:18)  POCT Blood Glucose.: 113 mg/dL (21 Jul 2020 00:00)  POCT Blood Glucose.: 142 mg/dL (20 Jul 2020 17:59)  POCT Blood Glucose.: 147 mg/dL (20 Jul 2020 12:09)    I&O's Summary    20 Jul 2020 07:01  -  21 Jul 2020 07:00  --------------------------------------------------------  IN: 500 mL / OUT: 975 mL / NET: -475 mL          Vital Signs Last 24 Hrs  T(C): 36.7 (21 Jul 2020 05:38), Max: 36.7 (20 Jul 2020 13:31)  T(F): 98 (21 Jul 2020 05:38), Max: 98.1 (20 Jul 2020 13:31)  HR: 68 (21 Jul 2020 05:38) (62 - 75)  BP: 156/79 (21 Jul 2020 05:38) (111/73 - 161/77)  BP(mean): --  RR: 15 (21 Jul 2020 05:38) (15 - 20)  SpO2: 100% (21 Jul 2020 05:38) (94% - 100%)    PHYSICAL EXAM:  GENERAL: obese elderly female with eyelids closed, appears somewhat restless  HEENT: enucleated orbits  NECK: rigid cervical collar, No JVD  CHEST/LUNG: Clear to auscultation bilaterally; no wheezes  or crackles noted  HEART: Regular rate and rhythm; No murmurs,   ABDOMEN: Soft, Nontender, Nondistended; Bowel sounds present,   EXTREMITIES:  2+ Peripheral Pulses, trace nonpitting edema to shins  NEUROLOGY: responds to voice, moves all extremities spontaneously  SKIN: excoriations lower abdomen    LABS                        10.7   6.53  )-----------( 186      ( 21 Jul 2020 06:47 )             37.9                         10.3   7.28  )-----------( 212      ( 20 Jul 2020 07:13 )             36.8     07-21    147<H>  |  102  |  25<H>  ----------------------------<  164<H>  4.5   |  37<H>  |  0.75  07-20    152<H>  |  106  |  34<H>  ----------------------------<  124<H>  4.3   |  37<H>  |  0.91    Ca    9.4      21 Jul 2020 06:46  Ca    9.6      20 Jul 2020 07:13  Phos  2.4     07-21  Mg     1.7     07-21      PT/INR - ( 20 Jul 2020 07:13 )   PT: 14.1 sec;   INR: 1.19 ratio         PTT - ( 20 Jul 2020 07:13 )  PTT:34.1 sec   15 Jul 2020 07:04 Troponin x     / CK 27 U/L / CKMB x     / CKMB Units 2.3 ng/mL          ( 20 Jul 2020 06:17 ) pH: 7.40  /  pCO2: 67    /  pO2: 47    / HCO3: 40    / Base Excess: 13.3  /  SaO2: 84              ( 19 Jul 2020 13:10 ) pH: 7.33  /  pCO2: 76    /  pO2: 99    / HCO3: 39    / Base Excess: 10.8  /  SaO2: 97              ( 17 Jul 2020 00:22 ) pH: 7.28  /  pCO2: 67    /  pO2: 89    / HCO3: 30    / Base Excess: 2.8   /  SaO2: 95                    RADIOLOGY & ADDITIONAL TESTS: Elías Martinez MD PGY1  Pager [phone]    Patient is a 80y old  Female who presents with a chief complaint of AMS (20 Jul 2020 13:21)      SUBJECTIVE/OVERNIGHT EVENTS: Wrist restraints and lyons removed in PM on 7/20 The patient was unable to tolerate MRI overnight. Received 2 haldol PRNs. Failed TOVx2. Attempted to pull off her cervical collar. On exam this morning is somewhat more alert, responding when her name is called and repositioning herself by rolling slightly to accomodate exam.    12 point review of symptoms negative except as above    MEDICATIONS  (STANDING):  budesonide 160 MICROgram(s)/formoterol 4.5 MICROgram(s) Inhaler 2 Puff(s) Inhalation two times a day  cefTRIAXone   IVPB 1000 milliGRAM(s) IV Intermittent every 24 hours  dextrose 5%. 1000 milliLiter(s) (50 mL/Hr) IV Continuous <Continuous>  dextrose 50% Injectable 12.5 Gram(s) IV Push once  dextrose 50% Injectable 25 Gram(s) IV Push once  dextrose 50% Injectable 25 Gram(s) IV Push once  insulin lispro (HumaLOG) corrective regimen sliding scale   SubCutaneous every 6 hours  pantoprazole  Injectable 40 milliGRAM(s) IV Push daily    MEDICATIONS  (PRN):  albuterol/ipratropium for Nebulization. 3 milliLiter(s) Nebulizer four times a day PRN Shortness of Breath and/or Wheezing  dextrose 40% Gel 15 Gram(s) Oral once PRN Blood Glucose LESS THAN 70 milliGRAM(s)/deciLiter  glucagon  Injectable 1 milliGRAM(s) IntraMuscular once PRN Glucose <70 milliGRAM(s)/deciLiter  haloperidol    Injectable 1 milliGRAM(s) IV Push every 6 hours PRN agitation    CAPILLARY BLOOD GLUCOSE      POCT Blood Glucose.: 141 mg/dL (21 Jul 2020 06:18)  POCT Blood Glucose.: 113 mg/dL (21 Jul 2020 00:00)  POCT Blood Glucose.: 142 mg/dL (20 Jul 2020 17:59)  POCT Blood Glucose.: 147 mg/dL (20 Jul 2020 12:09)    I&O's Summary    20 Jul 2020 07:01  -  21 Jul 2020 07:00  --------------------------------------------------------  IN: 500 mL / OUT: 975 mL / NET: -475 mL          Vital Signs Last 24 Hrs  T(C): 36.7 (21 Jul 2020 05:38), Max: 36.7 (20 Jul 2020 13:31)  T(F): 98 (21 Jul 2020 05:38), Max: 98.1 (20 Jul 2020 13:31)  HR: 68 (21 Jul 2020 05:38) (62 - 75)  BP: 156/79 (21 Jul 2020 05:38) (111/73 - 161/77)  BP(mean): --  RR: 15 (21 Jul 2020 05:38) (15 - 20)  SpO2: 100% (21 Jul 2020 05:38) (94% - 100%)    PHYSICAL EXAM:  GENERAL: obese elderly female with eyelids closed, appears somewhat restless  HEENT: enucleated orbits  NECK: rigid cervical collar, No JVD  CHEST/LUNG: Clear to auscultation bilaterally; no wheezes  or crackles noted  HEART: Regular rate and rhythm; No murmurs,   ABDOMEN: Soft, Nontender, Nondistended; Bowel sounds present,   EXTREMITIES:  2+ Peripheral Pulses, trace nonpitting edema to shins  NEUROLOGY: responds to voice, moves all extremities spontaneously  SKIN: skin tear lower abdomen with surrounding hypopigmentation    LABS                        10.7   6.53  )-----------( 186      ( 21 Jul 2020 06:47 )             37.9                         10.3   7.28  )-----------( 212      ( 20 Jul 2020 07:13 )             36.8     07-21    147<H>  |  102  |  25<H>  ----------------------------<  164<H>  4.5   |  37<H>  |  0.75  07-20    152<H>  |  106  |  34<H>  ----------------------------<  124<H>  4.3   |  37<H>  |  0.91    Ca    9.4      21 Jul 2020 06:46  Ca    9.6      20 Jul 2020 07:13  Phos  2.4     07-21  Mg     1.7     07-21      PT/INR - ( 20 Jul 2020 07:13 )   PT: 14.1 sec;   INR: 1.19 ratio         PTT - ( 20 Jul 2020 07:13 )  PTT:34.1 sec   15 Jul 2020 07:04 Troponin x     / CK 27 U/L / CKMB x     / CKMB Units 2.3 ng/mL          ( 20 Jul 2020 06:17 ) pH: 7.40  /  pCO2: 67    /  pO2: 47    / HCO3: 40    / Base Excess: 13.3  /  SaO2: 84              ( 19 Jul 2020 13:10 ) pH: 7.33  /  pCO2: 76    /  pO2: 99    / HCO3: 39    / Base Excess: 10.8  /  SaO2: 97              ( 17 Jul 2020 00:22 ) pH: 7.28  /  pCO2: 67    /  pO2: 89    / HCO3: 30    / Base Excess: 2.8   /  SaO2: 95          RADIOLOGY & ADDITIONAL TESTS: No new studies

## 2020-07-21 NOTE — DISCHARGE NOTE PROVIDER - HOSPITAL COURSE
80F Farsi speaking, hx of  systolic CHF, COPD, obstructive sleep apnea, DM2, schizoaffective disorder brought in by son due to altered mental status. Altered mentation began on 11AM on day of presentation according to patient's son.  She was noted to be more lethargic with decreased response to verbal stimuli and minimally ambulatory (though at baseline, patient dependent on others for activities of daily living). ROS reportedly negative for fevers, chills, dysuria, N/V.        In the ED:    VS: BP /54-69, HR 65-89, RR 17-25, O2 Sat 84% on RA --> 100% 2L NC --> BiPAP, Tmax 98.2.    Labs: CBC - WBC 10.64, Hgb 10.5, Plts 292. CMP - BUN/Cr 35/1.32, Phos 5.3, Mg 1.5, CMP otherwise WNL. BNP 93937. UA - protein 30, hyaline casts 8, otherwise unremarkable. Tox screen negative. COVID negative.    Imaging:     CXR - Bilateral patchy opacities may represent multifocal pneumonia or pulmonary edema. Bilateral pleural effusions.     CTH - No acute intracranial hemorrhage, large cortical infarct or mass effect. Partially visualized, age-indeterminate displaced fracture of the left C1 anterior and posterior arch. Asymmetry of the lateral atlantodental space.     CT cervical spine - Age-indeterminate, displaced left C1 anterior and posterior arch fracture as described, which may be acute given mild prevertebral soft tissue stranding/edema and no significant sclerosis at the fracture margin. Question mildly increased attenuation surrounding the thecal sac in the spinal canal. Extra-axial/epidural hematoma cannot be excluded.     CT Chest + ABD/pelvis - Small bilateral pleural effusion with atelectasis. Cardiomegaly. Nonspecific interlobular septal thickening and ground glass/patchy opacities in both lungs, which can be seen in pulmonary edema.         Patient given: Magnesium sulfate 2g x 1, zosyn x 1, Lasix 40 IV x 1; BIPAP.        7/15: Admitted to the MICU for AMS with hypercarbic respiratory failure. Treated with ceftriaxone and azithromycin while urine legionella pending.  Patient's son made the patient  DNR/DNI. She received haldol 1mg q 4 prn for agitation. Lasix 40mg x 1.    BCx grew G+ cocci in clusters in one bottle, likely contaminant. She received vanc x1    7/16: Straight cath multiple times with patient unable to void on her own so lyons was inserted.  Azithromycin discontinued because urine legionella negative.     7/17: Discontinued precedex and transitioned BiPAP to only at night.  Midodrine started to transition off pressors.    7/18: On 2L NC in am and BiPAP in pm.  Started tubal feedings today via NG tube. Holding lasix.    7/19: Free water boluses for hyperNa and Midodrine 10q8 to wean off pressors initiated    7/20- Patient acute on chronic respiratory acidosis on abg. CT C spine inadequate to R/O epidural hematoma, pending MRI, holding AC until deemed stable. Patient pulled NGT and is currently receiving IV therapy. Updated son on medical condition.     7/21- doing better clinically, pending formal palliaitve c/s. MR C spine pending. 80F Farsi speaking, hx of  systolic CHF, COPD, obstructive sleep apnea, DM2, schizoaffective disorder brought in by son due to altered mental status. Altered mentation began on 11AM on day of presentation according to patient's son.  She was noted to be more lethargic with decreased response to verbal stimuli and minimally ambulatory (though at baseline, patient dependent on others for activities of daily living). ROS reportedly negative for fevers, chills, dysuria, N/V.        In the ED:    VS: BP /54-69, HR 65-89, RR 17-25, O2 Sat 84% on RA --> 100% 2L NC --> BiPAP, Tmax 98.2.    Labs: CBC - WBC 10.64, Hgb 10.5, Plts 292. CMP - BUN/Cr 35/1.32, Phos 5.3, Mg 1.5, CMP otherwise WNL. BNP 39207. UA - protein 30, hyaline casts 8, otherwise unremarkable. Tox screen negative. COVID negative.    Imaging:     CXR - Bilateral patchy opacities may represent multifocal pneumonia or pulmonary edema. Bilateral pleural effusions.     CTH - No acute intracranial hemorrhage, large cortical infarct or mass effect. Partially visualized, age-indeterminate displaced fracture of the left C1 anterior and posterior arch. Asymmetry of the lateral atlantodental space.     CT cervical spine - Age-indeterminate, displaced left C1 anterior and posterior arch fracture as described, which may be acute given mild prevertebral soft tissue stranding/edema and no significant sclerosis at the fracture margin. Question mildly increased attenuation surrounding the thecal sac in the spinal canal. Extra-axial/epidural hematoma cannot be excluded.     CT Chest + ABD/pelvis - Small bilateral pleural effusion with atelectasis. Cardiomegaly. Nonspecific interlobular septal thickening and ground glass/patchy opacities in both lungs, which can be seen in pulmonary edema.         Patient given: Magnesium sulfate 2g x 1, zosyn x 1, Lasix 40 IV x 1; BIPAP.        7/15: Admitted to the MICU for AMS with hypercarbic respiratory failure. Treated with ceftriaxone and azithromycin while urine legionella pending.  Patient's son made the patient  DNR/DNI. She received haldol 1mg q 4 prn for agitation. Lasix 40mg x 1.    BCx grew G+ cocci in clusters in one bottle, likely contaminant. She received vanc x1    7/16: Straight cath multiple times with patient unable to void on her own so lyons was inserted.  Azithromycin discontinued because urine legionella negative.     7/17: Discontinued precedex and transitioned BiPAP to only at night.  Midodrine started to transition off pressors.    7/18: On 2L NC in am and BiPAP in pm.  Started tubal feedings today via NG tube. Holding lasix.    7/19: Free water boluses for hyperNa and Midodrine 10q8 to wean off pressors initiated    7/20- Patient acute on chronic respiratory acidosis on abg. CT C spine inadequate to R/O epidural hematoma, pending MRI, holding AC until deemed stable. Patient pulled NGT and is currently receiving IV therapy. Updated son on medical condition.     7/21- Doing better clinically, pending formal palliaitve c/s. MR JAQUEZ spine pending.     7/23- Evaluated by pulmonology for home CPAP, B-lines on pocus however remains with hypernatremia    7/24- Monitored off nightly cpap and diuresed with Lasix 40mg PO    7/25- Still volume up however did well overnight with only NC o2        Since discharge from MICU the patients mentation continued to improve and by 7/24 she would state her name when asked in english and could say "yes" and "thank you".  She was unable to engage with the  [employer] line [employer] . Wrist restraints were discontinued and lyons catheter removed and trial of void was passed. She dislodged her own NG tube but was able to pass a speech evaluation and cleared for a dysphagia one diet..         Her hyponatremia improved with free water boluses. She was evaluated by pulmonology and felt to be volume overloaded based on lung ultrasound. Diuresis was begun with  Lasix 40mg PO.        Patient also underwent MRI cervical spine to better characterize her C1 fx and to R/O epidural hematoma based on findings from neck CT. Although the quality of the study was poor due to motion artifact Neurosurgery was able to interpret the images and advised that her C1 fx did not appear unstable and rigid cervical collar could be discontinued and that there was low concern for epidural hematoma and AC could resume after consideration of risks/benefits and with close follow up for signs of neurologic compromise. After discussion with the patient;'s son the decision was made to resume in light of the patient's history of recurrent DVT and PE and likely immobility after discharge 80F Farsi speaking, hx of  systolic CHF, COPD, obstructive sleep apnea, DM2, schizoaffective disorder brought in by son due to altered mental status. Altered mentation began on 11AM on day of presentation according to patient's son.  She was noted to be more lethargic with decreased response to verbal stimuli and minimally ambulatory (though at baseline, patient dependent on others for activities of daily living). ROS reportedly negative for fevers, chills, dysuria, N/V.        In the ED:        VS: BP /54-69, HR 65-89, RR 17-25, O2 Sat 84% on RA --> 100% 2L NC --> BiPAP, Tmax 98.2.    Labs: CBC - WBC 10.64, Hgb 10.5, Plts 292. CMP - BUN/Cr 35/1.32, Phos 5.3, Mg 1.5, CMP otherwise WNL. BNP 03754. UA - protein 30, hyaline casts 8, otherwise unremarkable. Tox screen negative. COVID negative.        Imaging:     CXR - Bilateral patchy opacities may represent multifocal pneumonia or pulmonary edema. Bilateral pleural effusions.     CTH - No acute intracranial hemorrhage, large cortical infarct or mass effect. Partially visualized, age-indeterminate displaced fracture of the left C1 anterior and posterior arch. Asymmetry of the lateral atlantodental space.     CT cervical spine - Age-indeterminate, displaced left C1 anterior and posterior arch fracture as described, which may be acute given mild prevertebral soft tissue stranding/edema and no significant sclerosis at the fracture margin. Question mildly increased attenuation surrounding the thecal sac in the spinal canal. Extra-axial/epidural hematoma cannot be excluded.     CT Chest + ABD/pelvis - Small bilateral pleural effusion with atelectasis. Cardiomegaly. Nonspecific interlobular septal thickening and ground glass/patchy opacities in both lungs, which can be seen in pulmonary edema.         Patient given: Magnesium sulfate 2g x 1, zosyn x 1, Lasix 40 IV x 1; BIPAP.        7/15: Admitted to the MICU for AMS with hypercarbic respiratory failure. Treated with ceftriaxone and azithromycin while urine legionella pending.  Patient's son made the patient  DNR/DNI. She received haldol 1mg q 4 prn for agitation. Lasix 40mg x 1.    BCx grew G+ cocci in clusters in one bottle, likely contaminant. She received vanc x1    7/16: Straight cath multiple times with patient unable to void on her own so lyons was inserted.  Azithromycin discontinued because urine legionella negative.     7/17: Discontinued precedex and transitioned BiPAP to only at night.  Midodrine started to transition off pressors.    7/18: On 2L NC in am and BiPAP in pm.  Started tubal feedings today via NG tube. Holding lasix.    7/19: Free water boluses for hyperNa and Midodrine 10q8 to wean off pressors initiated    7/20- Patient acute on chronic respiratory acidosis on abg. CT C spine inadequate to R/O epidural hematoma, pending MRI, holding AC until deemed stable. Patient pulled NGT and is currently receiving IV therapy. Updated son on medical condition.     7/21- Doing better clinically, pending formal palliaitve c/s. MR JAQUEZ spine pending.     7/23- Evaluated by pulmonology for home CPAP, B-lines on pocus however remains with hypernatremia    7/24- Monitored off nightly cpap and diuresed with Lasix 40mg PO    7/25- Still volume up however did well overnight with only NC o2        Since discharge from MICU the patients mentation continued to improve and by 7/24 she would state her name when asked in english and could say "yes" and "thank you".  She was unable to engage with the  [employer] line [employer] . Wrist restraints were discontinued and lyons catheter removed and trial of void was passed. She dislodged her own NG tube but was able to pass a speech evaluation and cleared for a dysphagia one diet..         Her hyponatremia improved with free water boluses. She was evaluated by pulmonology and felt to be volume overloaded based on lung ultrasound. Diuresis was begun with  Lasix 40mg PO.        Patient also underwent MRI cervical spine to better characterize her C1 fx and to R/O epidural hematoma based on findings from neck CT. Although the quality of the study was poor due to motion artifact Neurosurgery was able to interpret the images and advised that her C1 fx did not appear unstable and rigid cervical collar could be discontinued and that there was low concern for epidural hematoma and AC could resume after consideration of risks/benefits and with close follow up for signs of neurologic compromise. 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 On 7/28, patient was seen and deemed hemodynamically stable for discharge.

## 2020-07-21 NOTE — CONSULT NOTE ADULT - SUBJECTIVE AND OBJECTIVE BOX
HPI:  80F PMHx COPD (unknown level O2 at home), DM2, CHF, schizoaffective disorder brought in by relative due to altered mental status. History limited as communication with patient limited due to mental status and no relative present to provide details of HPI. Per ED staff, who was present when son initially brought her to hospital earlier in evening, altered mentation began on 11AM on day of presentation; she was noted to be more lethargic with decreased response to verbal stimuli and minimally ambulatory (though at baseline, patient dependent on others for activities of daily living). ROS reportedly negative for fevers, chills, dysuria, N/V.    In the ED:  VS: BP /54-69, HR 65-89, RR 17-25, O2 Sat 84% on RA --> 100% 2L NC --> BiPAP, Tmax 98.2.  Labs: CBC - WBC 10.64, Hgb 10.5, Plts 292. CMP - BUN/Cr 35/1.32, Phos 5.3, Mg 1.5, CMP otherwise WNL. BNP 31118. UA - protein 30, hyaline casts 8, otherwise unremarkable. Tox screen negative. COVID negative.  Imaging:   CXR - Bilateral patchy opacities may represent multifocal pneumonia or pulmonary edema. Bilateral pleural effusions.   CTH - No acute intracranial hemorrhage, large cortical infarct or mass effect. Somewhat eccentric lucency in the right cerebellum, which be due to artifact although infarct cannot be excluded. If clinically indicated, short-term follow-up or MRI may be obtained for further evaluation. Partially visualized, age-indeterminate displaced fracture of the left C1 anterior and posterior arch. Asymmetry of the lateral atlantodental space. Recommend comparison to prior outside study.  CT cervical spine - Age-indeterminate, displaced left C1 anterior and posterior arch fracture as described, which may be acute given mild prevertebral soft tissue stranding/edema and no significant sclerosis at the fracture margin. Question mildly increased attenuation surrounding the thecal sac in the spinal canal. Extra-axial/epidural hematoma cannot be excluded. MRI is recommended for further evaluation of ligamentous injury and extra-axial hematoma. Nonspecific bilateral mastoid opacification. Recommend clinical correlation to assess acute mastoiditis.  CT Chest + ABD/pelvis - Small bilateral pleural effusion with atelectasis. Cardiomegaly. Nonspecific interlobular septal thickening and ground glass/patchy opacities in both lungs, which can be seen in pulmonary edema. Recommend clinical correlation to assess underlying pneumonia. No bowel obstruction. Abdominal wall hernias as described. Nonspecific mild bilateral perinephric stranding without hydroureteronephrosis. Diffuse prominent bladder wall with nonspecific perivesical stranding. Recommend clinical correlation to assess urinary tract infection. Nonspecific stranding in the right lateral abdominal wall soft tissue, which may represent edema and/or cellulitis. Recommend direct visualization.    Patient given: Magnesium sulfate 2g x 1, zosyn x 1, Lasix 40 IV x 1; BIPAP. (15 Jul 2020 05:48)    PERTINENT PM/SXH:   COPD (chronic obstructive pulmonary disease)  Hypertension  Schizoaffective disorder  Diabetes mellitus  Congestive heart failure    H/O abdominal surgery  No significant past surgical history    FAMILY HISTORY:    ITEMS NOT CHECKED ARE NOT PRESENT    SOCIAL HISTORY:   Significant other/partner[x ]  Children[ ]  Hindu/Spirituality:  Substance hx:  [ ]   Tobacco hx:  [ ]   Alcohol hx: [ ]   Home Opioid hx:  [ ] I-Stop Reference No:  Living Situation: [ ]Home  [ ]Long term care  [ ]Rehab [ ]Other    ADVANCE DIRECTIVES:    DNR  Yes  Yes  MOLST  [ ]  Living Will  [ ]   DECISION MAKER(s):  [ ] Health Care Proxy(s)  [ ] Surrogate(s)  [ ] Guardian           Name(s): Phone Number(s):    BASELINE (I)ADL(s) (prior to admission):  [place]: [ ]Total  [ ] Moderate [ ]Dependent    Allergies    No Known Allergies    Intolerances    MEDICATIONS  (STANDING):  budesonide 160 MICROgram(s)/formoterol 4.5 MICROgram(s) Inhaler 2 Puff(s) Inhalation two times a day  dextrose 5%. 1000 milliLiter(s) (50 mL/Hr) IV Continuous <Continuous>  dextrose 5%. 1000 milliLiter(s) (100 mL/Hr) IV Continuous <Continuous>  dextrose 50% Injectable 12.5 Gram(s) IV Push once  dextrose 50% Injectable 25 Gram(s) IV Push once  dextrose 50% Injectable 25 Gram(s) IV Push once  insulin lispro (HumaLOG) corrective regimen sliding scale   SubCutaneous every 6 hours  pantoprazole  Injectable 40 milliGRAM(s) IV Push daily    MEDICATIONS  (PRN):  albuterol/ipratropium for Nebulization. 3 milliLiter(s) Nebulizer four times a day PRN Shortness of Breath and/or Wheezing  bisacodyl Suppository 10 milliGRAM(s) Rectal daily PRN Constipation  dextrose 40% Gel 15 Gram(s) Oral once PRN Blood Glucose LESS THAN 70 milliGRAM(s)/deciLiter  glucagon  Injectable 1 milliGRAM(s) IntraMuscular once PRN Glucose <70 milliGRAM(s)/deciLiter  haloperidol    Injectable 1 milliGRAM(s) IV Push every 6 hours PRN agitation    PRESENT SYMPTOMS: [x ]Unable to obtain due to poor mentation   Source if other than patient:  [ ]Family   [ ]Team     Pain: [ ]yes [ ]no unable to assess.   QOL impact -   Location -                    Aggravating factors -  Quality -  Radiation -  Timing-  Severity (0-10 scale):  Minimal acceptable level (0-10 scale):     CPOT:    https://www.[facility].org/getattachment/[email or web address]/Critical-Care-Pain-Observation-Tool-(CPOT)      PAIN AD Score:     http://geriatrictoolkit.[facility]/cog/painad.pdf (press ctrl +  left click to view)    Dyspnea:                           [ ]Mild [ ]Moderate [ ]Severe  Anxiety:                             [ ]Mild [ ]Moderate [ ]Severe  Fatigue:                             [ ]Mild [ ]Moderate [ ]Severe  Nausea:                             [ ]Mild [ ]Moderate [ ]Severe  Loss of appetite:              [ ]Mild [ ]Moderate [ ]Severe  Constipation:                    [ ]Mild [ ]Moderate [ ]Severe    Other Symptoms:  [ ]All other review of systems negative     Palliative Performance Status Version 2:         %    http://npcrc.org/files/news/palliative_performance_scale_ppsv2.pdf  PHYSICAL EXAM:  Vital Signs Last 24 Hrs  T(C): 36.7 (21 Jul 2020 05:38), Max: 36.7 (20 Jul 2020 13:31)  T(F): 98 (21 Jul 2020 05:38), Max: 98.1 (20 Jul 2020 13:31)  HR: 55 (21 Jul 2020 09:54) (55 - 75)  BP: 156/79 (21 Jul 2020 05:38) (111/73 - 161/77)  BP(mean): --  RR: 15 (21 Jul 2020 05:38) (15 - 20)  SpO2: 99% (21 Jul 2020 09:54) (94% - 100%) I&O's Summary    20 Jul 2020 07:01  -  21 Jul 2020 07:00  --------------------------------------------------------  IN: 500 mL / OUT: 975 mL / NET: -475 mL    21 Jul 2020 07:01  -  21 Jul 2020 11:43  --------------------------------------------------------  IN: 150 mL / OUT: 0 mL / NET: 150 mL      GENERAL:  [ ]Alert  [ ]Oriented x   [x ]Lethargic  [ ]Cachexia  [ ]Unarousable  [ ]Verbal  [ ]Non-Verbal  Behavioral:   [ ] Anxiety  [x ] Delirium [ ] Agitation [ ] Other  HEENT:  [ x]Normal   [ ]Dry mouth   [ ]ET Tube/Trach  [ ]Oral lesions  PULMONARY:   [x]Clear [ ]Tachypnea  [ ]Audible excessive secretions   [ ]Rhonchi        [ ]Right [ ]Left [ ]Bilateral  [ ]Crackles        [ ]Right [ ]Left [ ]Bilateral  [ ]Wheezing     [ ]Right [ ]Left [ ]Bilateral  [ ]Diminished breath sounds [ ]right [ ]left [ ]bilateral  CARDIOVASCULAR:    [x ]Regular [ ]Irregular [ ]Tachy  [ ]Darion [ ]Murmur [ ]Other  GASTROINTESTINAL:  [x ]Soft  [ ]Distended   [ ]+BS  [ ]Non tender [ ]Tender  [ ]PEG [ ]OGT/ NGT  Last BM:     GENITOURINARY:  [ ]Normal [ ] Incontinent   [ ]Oliguria/Anuria   [ ]Ralph  MUSCULOSKELETAL:   [ ]Normal   [ ]Weakness  [ ]Bed/Wheelchair bound [ ]Edema  NEUROLOGIC:   [ ]No focal deficits  [ ]Cognitive impairment  [ ]Dysphagia [ ]Dysarthria [ ]Paresis [ ]Other   SKIN:   [ ]Normal    [ x]Rash  [ ]Pressure ulcer(s)       Present on admission [ ]y [ ]n    CRITICAL CARE:  [ ] Shock Present  [ ]Septic [ ]Cardiogenic [ ]Neurologic [ ]Hypovolemic  [ ]  Vasopressors [ ]  Inotropes   [ ]Respiratory failure present [ ]Mechanical ventilation [ ]Non-invasive ventilatory support [ ]High flow    [ ]Acute  [ ]Chronic [ ]Hypoxic  [ ]Hypercarbic [ ]Other  [ ]Other organ failure     LABS:                        10.7   6.53  )-----------( 186      ( 21 Jul 2020 06:47 )             37.9   07-21    147<H>  |  102  |  25<H>  ----------------------------<  164<H>  4.5   |  37<H>  |  0.75    Ca    9.4      21 Jul 2020 06:46  Phos  2.4     07-21  Mg     1.7     07-21    PT/INR - ( 20 Jul 2020 07:13 )   PT: 14.1 sec;   INR: 1.19 ratio         PTT - ( 20 Jul 2020 07:13 )  PTT:34.1 sec      RADIOLOGY & ADDITIONAL STUDIES:    PROTEIN CALORIE MALNUTRITION PRESENT: [ ]mild [ ]moderate [ ]severe [ ]underweight [ ]morbid obesity  https://www.andeal.org/vault/2440/web/files/ONC/Table_Clinical%20Characteristics%20to%20Document%20Malnutrition-White%20JV%20et%20al%351639.pdf      Weight (kg): 82 (07-15-20 @ 06:30)    [ ]PPSV2 < or = to 30% [ ]significant weight loss  [ ]poor nutritional intake  [ ]anasarca     Albumin, Serum: 3.0 g/dL (07-17-20 @ 00:27)   [ ]Artificial Nutrition      REFERRALS:   [ ]Chaplaincy  [ ]Hospice  [ ]Child Life  [ ]Social Work  [ ]Case management [ ]Holistic Therapy     Goals of Care Document: Farsi  used; however, the patient did not answer any questions.   HPI:  80 Year old female with chronic systolic congestive heart failure (EF 25 %), obstructive sleep apnea, copd, schizoaffective disorder, pulmonary embolism (moderate to severe pulmonary HTN) presenting to the hospital with lethargy and confusion. The patient presented in shock with hypercarbic respiratory failure. Stepped up to MICU for ventilatory and pressure support. Now stepped down to medical floor. Imaging notable for CT head demonstrating nondisplaced C1 fx and findings suspicious for epidural hematoma, CT chest with diffuse ground glass opacity suspicious for pneumonia or CHF. Labs notable for normal wbc, hypernatremia and hypercarbia with elevated bicarbonate. Palliative care was consulted for GOC.     PERTINENT PM/SXH:   COPD (chronic obstructive pulmonary disease)  Hypertension  Schizoaffective disorder  Diabetes mellitus  Congestive heart failure    H/O abdominal surgery  No significant past surgical history    FAMILY HISTORY:    ITEMS NOT CHECKED ARE NOT PRESENT    SOCIAL HISTORY:   Significant other/partner[x ]    Children[x ] 2; however, 1 is "disabled."   Adventist/Spirituality: Orthodoxy   Substance hx:  [ ]   Tobacco hx:  [ ]   Alcohol hx: [ ]   Home Opioid hx:  [ ] I-Stop Reference No:  Living Situation: [x ]Home  [ ]Long term care  [ ]Rehab [ ]Other   As per care coordination notes: unable to participate in  interview or identify caregiver. LCSW made contact with patient's son, Miguel Angel Alanis ([phone]), for supportive intervention on this date. LCSW explained  social work role and patient's son verbalized understanding. Patient's son  coping appropriately and reports having clear communication with the medical  team. Support provided in regards to admission.     Patient resides in a private two family home in [place]. Patient resides on the  basement level, no stairs needed to access living space. Patient's son,  daughter in law, and grandson reside on upper level of home. Patient requires  assistance with ADLs and ambulation pta. Patient requires cane and family  assist to ambulate. Patient's daughter in law is HHA via CDPAP, 8hrs/7days.  Family also assists with Diabetic management and son confirms having functional  Glucometer present in home. Patient with intact family supports. Patient is  ; spouse passed away in January of this year. Patient with 2 children.  Patient's son, Miguel Angel, identified as emergency contact as needed. No advanced  directives in place. Patient's 2 children are equal surrogate decision makers  as needed. Patient with [employer] Medicare and Medicaid insurance. No  financial, cultural, or spiritual needs identified at this time. Patient's son  denied concerns and reports that all needs are being met. Contact information  provided. Social work to continue to collaborate with interdisciplinary team.    Anticipated Discharge Plan and Services    Notes: Discharge needs remain unclear, pending ongoing hospital course.  ADVANCE DIRECTIVES:    DNR  Yes  Yes  MOLST  [ ]  Living Will  [ ]   DECISION MAKER(s):  [ ] Health Care Proxy(s)  [x ] Surrogate(s)  [ ] Guardian           Name(s): Phone Number(s): Son as above.     BASELINE (I)ADL(s) (prior to admission):  [place]: [ ]Total  [x ] Moderate to Dependent    Allergies    No Known Allergies    Intolerances    MEDICATIONS  (STANDING):  budesonide 160 MICROgram(s)/formoterol 4.5 MICROgram(s) Inhaler 2 Puff(s) Inhalation two times a day  dextrose 5%. 1000 milliLiter(s) (50 mL/Hr) IV Continuous <Continuous>  dextrose 5%. 1000 milliLiter(s) (100 mL/Hr) IV Continuous <Continuous>  dextrose 50% Injectable 12.5 Gram(s) IV Push once  dextrose 50% Injectable 25 Gram(s) IV Push once  dextrose 50% Injectable 25 Gram(s) IV Push once  insulin lispro (HumaLOG) corrective regimen sliding scale   SubCutaneous every 6 hours  pantoprazole  Injectable 40 milliGRAM(s) IV Push daily    MEDICATIONS  (PRN):  albuterol/ipratropium for Nebulization. 3 milliLiter(s) Nebulizer four times a day PRN Shortness of Breath and/or Wheezing  bisacodyl Suppository 10 milliGRAM(s) Rectal daily PRN Constipation  dextrose 40% Gel 15 Gram(s) Oral once PRN Blood Glucose LESS THAN 70 milliGRAM(s)/deciLiter  glucagon  Injectable 1 milliGRAM(s) IntraMuscular once PRN Glucose <70 milliGRAM(s)/deciLiter  haloperidol    Injectable 1 milliGRAM(s) IV Push every 6 hours PRN agitation    PRESENT SYMPTOMS: [x ]Unable to obtain due to poor mentation   Source if other than patient:  [ ]Family   [ ]Team     Pain: [ ]yes [ ]no unable to assess.   QOL impact -   Location -                    Aggravating factors -  Quality -  Radiation -  Timing-  Severity (0-10 scale):  Minimal acceptable level (0-10 scale):     CPOT:    https://www.[facility].org/getattachment/[email or web address]/Critical-Care-Pain-Observation-Tool-(CPOT)      PAIN AD Score: 0    http://geriatrictoolkit.[facility]/cog/painad.pdf (press ctrl +  left click to view)    Dyspnea:                           [ ]Mild [ ]Moderate [ ]Severe  Anxiety:                             [ ]Mild [ ]Moderate [ ]Severe  Fatigue:                             [ ]Mild [ ]Moderate [ ]Severe  Nausea:                             [ ]Mild [ ]Moderate [ ]Severe  Loss of appetite:              [ ]Mild [ ]Moderate [ ]Severe  Constipation:                    [ ]Mild [ ]Moderate [ ]Severe    Other Symptoms:  [ ]All other review of systems negative     Palliative Performance Status Version 2:   20      %    http://npcrc.org/files/news/palliative_performance_scale_ppsv2.pdf  PHYSICAL EXAM:  Vital Signs Last 24 Hrs  T(C): 36.7 (21 Jul 2020 05:38), Max: 36.7 (20 Jul 2020 13:31)  T(F): 98 (21 Jul 2020 05:38), Max: 98.1 (20 Jul 2020 13:31)  HR: 55 (21 Jul 2020 09:54) (55 - 75)  BP: 156/79 (21 Jul 2020 05:38) (111/73 - 161/77)  BP(mean): --  RR: 15 (21 Jul 2020 05:38) (15 - 20)  SpO2: 99% (21 Jul 2020 09:54) (94% - 100%) I&O's Summary    20 Jul 2020 07:01  -  21 Jul 2020 07:00  --------------------------------------------------------  IN: 500 mL / OUT: 975 mL / NET: -475 mL    21 Jul 2020 07:01  -  21 Jul 2020 11:43  --------------------------------------------------------  IN: 150 mL / OUT: 0 mL / NET: 150 mL      GENERAL:  [ ]Alert  [ ]Oriented x   [x ]Lethargic  [ ]Cachexia  [ ]Unarousable  [ ]Verbal  [ ]Non-Verbal [x] Obese   Behavioral:   [ ] Anxiety  [x ] Delirium [ ] Agitation [ ] Other  HEENT:  [ x]Normal   [ ]Dry mouth   [ ]ET Tube/Trach  [ ]Oral lesions  PULMONARY:   [x]Clear [ ]Tachypnea  [ ]Audible excessive secretions   [ ]Rhonchi        [ ]Right [ ]Left [ ]Bilateral  [ ]Crackles        [ ]Right [ ]Left [ ]Bilateral  [ ]Wheezing     [ ]Right [ ]Left [ ]Bilateral  [ ]Diminished breath sounds [ ]right [ ]left [ ]bilateral  CARDIOVASCULAR:    [x ]Regular [ ]Irregular [ ]Tachy  [ ]Darion [ ]Murmur [ ]Other  GASTROINTESTINAL:  [x ]Soft  [ ]Distended   [ ]+BS  [ ]Non tender [ ]Tender  [ ]PEG [ ]OGT/ NGT  Last BM: 7/19    GENITOURINARY:  [ ]Normal [ ] Incontinent   [ ]Oliguria/Anuria   [x ]Ralph  MUSCULOSKELETAL:   [ ]Normal   [ ]Weakness  [ ]Bed/Wheelchair bound [x ]Edema  NEUROLOGIC:   [ ]No focal deficits  [x ]Cognitive impairment  [ ]Dysphagia [ ]Dysarthria [ ]Paresis [ ]Other   SKIN:   [ ]Normal    [ x]Rash  [ ]Pressure ulcer(s)       Present on admission [ ]y [ ]n  [x] Abdominal wound covered.     CRITICAL CARE:  [ ] Shock Present  [ ]Septic [ ]Cardiogenic [ ]Neurologic [ ]Hypovolemic  [ ]  Vasopressors [ ]  Inotropes   [ ]Respiratory failure present [ ]Mechanical ventilation [ ]Non-invasive ventilatory support [ ]High flow    [ ]Acute  [ ]Chronic [ ]Hypoxic  [ ]Hypercarbic [ ]Other  [ ]Other organ failure     LABS:                        10.7   6.53  )-----------( 186      ( 21 Jul 2020 06:47 )             37.9   07-21    147<H>  |  102  |  25<H>  ----------------------------<  164<H>  4.5   |  37<H>  |  0.75    Ca    9.4      21 Jul 2020 06:46  Phos  2.4     07-21  Mg     1.7     07-21    PT/INR - ( 20 Jul 2020 07:13 )   PT: 14.1 sec;   INR: 1.19 ratio         PTT - ( 20 Jul 2020 07:13 )  PTT:34.1 sec      RADIOLOGY & ADDITIONAL STUDIES:    < from: CT Chest w/ IV Cont (07.15.20 @ 02:27) >    EXAM:  CT ABDOMEN AND PELVIS IC                          EXAM:  CT CHEST IC                            PROCEDURE DATE:  07/15/2020  IMPRESSION:     Small bilateral pleural effusion with atelectasis. Cardiomegaly. Nonspecific interlobular septal thickening and groundglass/patchy opacities in both lungs, which can be seen in pulmonaryedema. Recommend clinical correlation to assess underlying pneumonia.     No bowel obstruction. Abdominal wall hernias as described.    Nonspecific mild bilateral perinephric stranding without hydroureteronephrosis. Diffuse prominent bladder wall with nonspecific perivesical stranding. Recommend clinical correlation to assess urinary tract infection.    Nonspecific stranding in the right lateral abdominal wall soft tissue, which may represent edema and/or cellulitis. Recommend direct visualization.    Additional findings as described.                < end of copied text >    < from: CT Cervical Spine No Cont (07.15.20 @ 03:29) >  EXAM:  CT CERVICAL SPINE                            PROCEDURE DATE:  07/15/2020    IMPRESSION:     Age-indeterminate, displaced left C1 anterior and posterior arch fracture as described, which may be acute given mild prevertebral soft tissue stranding/edema and no significant sclerosis at the fracture margin. Question mildly increased attenuation surrounding the thecal sac in the spinal canal. Extra-axial/epidural hematoma cannot be excluded. MRI is recommended for further evaluation of ligamentous injury and extra-axial hematoma.    Nonspecific bilateral mastoid opacification. Recommend clinical correlation to assess acute mastoiditis.                THONY CALDERON M.D., RADIOLGY RESIDENT  This document has been electronically signed.  TREVON VINCENT M.D., ATTENDING RADIOLOGIST    < end of copied text >    PROTEIN CALORIE MALNUTRITION PRESENT: [ ]mild [ ]moderate [ ]severe [ ]underweight [ ]morbid obesity  https://www.andeal.org/vault/3045/web/files/ONC/Table_Clinical%20Characteristics%20to%20Document%20Malnutrition-White%20JV%20et%20al%195769.pdf      Weight (kg): 82 (07-15-20 @ 06:30)    [ ]PPSV2 < or = to 30% [ ]significant weight loss  [ ]poor nutritional intake  [ ]anasarca     Albumin, Serum: 3.0 g/dL (07-17-20 @ 00:27)   [ ]Artificial Nutrition      REFERRALS:   [ ]Chaplaincy  [ ]Hospice  [ ]Child Life  [ ]Social Work  [ ]Case management [ ]Holistic Therapy     Goals of Care Document:

## 2020-07-21 NOTE — PROGRESS NOTE ADULT - PROBLEM SELECTOR PLAN 1
-Continue CTX 1g IV daily for 7 day course.   -MRSA PCR is negative, Legionella urine test is negative, bcx with no growth  -Patient is s/p azithromycin for atypical pneumonia.   -Continue O2 via nasal cannula during daytime. -Continue CTX 1g IV daily for total 7 day course. Last day will be 7/22.  -MRSA PCR is negative, Legionella urine test is negative, bcx with no growth  -Patient is s/p azithromycin for atypical pneumonia.   -Continue O2 via nasal cannula during daytime, weaning as able to goal O2 sat 94%.  -Secretion mobilization PRN

## 2020-07-21 NOTE — PROGRESS NOTE ADULT - PROBLEM SELECTOR PLAN 2
-Continue BIPAP qhs    -O2 via NC during daytime, with goal O2 sat 94%. -Continue BIPAP qhs    -O2 via NC during daytime, with goal O2 sat 94%.  -Monitor serum bicarb.

## 2020-07-21 NOTE — PROGRESS NOTE ADULT - PROBLEM SELECTOR PLAN 8
-Continue C-collar.   -Neurosurgery follow up.   -Cannot rule out epidural hematoma seen on CT.   -Holding systemic anticoagulation pending MR of C-spine for further classification. -Continue C-collar.   -Neurosurgery follow up.   -Cannot rule out epidural hematoma seen on CT.   -Holding systemic anticoagulation pending MR of C-spine for further classification. Will need to coordinate with MRI for optimal timing to ensure a quality study.

## 2020-07-21 NOTE — CONSULT NOTE ADULT - PROBLEM SELECTOR RECOMMENDATION 2
Continue with current medical management s/p Abx   On O2 and BIPAP PRN   CHF Rx as per primary team.

## 2020-07-21 NOTE — PROGRESS NOTE ADULT - ASSESSMENT
80 Year old female, Farsi speaking, hx of chronic systolic congestive heart failure, obstructive sleep apnea, copd, schizoaffective disorder, pulmonary embolism presenting to the hospital with lethargy and confusion. The patient presented in shock with hypercarbic respiratory failure. Stepped up to MICU for ventilatory and pressure support. Now stepped down to medical floor. Imaging notable for CT head demonstrating nondisplaced C1 fx and findings suspicious for epidural hematoma, CT chest with diffuse ground glass opacity suspicious for pneumonia or CHF. Labs notable for normal wbc, hypernatremia and hypercarbia with elevated bicarbonate

## 2020-07-21 NOTE — CONSULT NOTE ADULT - PROBLEM SELECTOR RECOMMENDATION 9
Discussed hospital course and medical treatment with son, Miguel Angel Alanis. Mr Alanis states he has one brother, however this brother has a disability and is incapable of participating in making medical decisions for his mother, therefore the sole decision maker is Miguel Angel Alanis.     Prior to hospitalization, pt was having worsening strength and gradually declining, especially with memory. Even so, Mr Alanis's goal is for his mother is to return home as she was prior to her admission. Mr. Alanis is understanding of his mother's multiple medical comorbidities and that this may not be possible.     Recommendations:  - Continue with current medical management including antibiotics and oxygen supplementation prn.  - Mr Alanis's main concern is his mother receiving nutrition. He is open to ngt however understands that mother has been pulling out tube and she failed speech and swallow evaluation. Avoid restraints.   - Pt may be a candidate for home hospice due to chronic medical conditions, will readdress with family during discharge planning. Likely multifactorial (2/2 to infection, hypoxemia, metabolic issues).   Work up and Rx as per primary team.   Promote:   -Frequent reassurance and verbal orientation   -Family members or other familiar persons by his bedside.   -Delusions and hallucinations should be neither endorsed nor challenged.   -Physical restraints should be avoided. Alternatives to restraint use, such as constant observation (preferably by someone familiar to the patient such as a family member), may be more effective.  -PT eval and early ambulation if possible  -Move to a room with a window   -Use a [facility]  to talk to the patient.

## 2020-07-21 NOTE — CONSULT NOTE ADULT - PROBLEM SELECTOR RECOMMENDATION 5
Will continue to f/u for GOC and resources.         Richard Branch MD   Geriatrics and Palliative Care (GAP) Consult Service    of Geriatric and Palliative Medicine  [facility]      Please page the following number for clinical matters between the hours of 9 am and 5 pm from Monday through Friday : [phone]    After 5pm and on weekends, please see the contact information below:    In the event of newly developing, evolving, or worsening symptoms, please contact the Palliative Medicine team via pager (if the patient is at [facility] #[phone] or if the patient is at [facility] #[phone]) The Geriatric and Palliative Medicine service has coverage 24 hours a day/ 7 days a week to provide medical recommendations regarding symptom management needs via telephone

## 2020-07-21 NOTE — DISCHARGE NOTE PROVIDER - CARE PROVIDER_API CALL
Wolf Colvin  Phone: (   )    -  Fax: (   )    -  Follow Up Time: Wolf Colvin  Phone: (   )    -  Fax: (   )    -  Established Patient  Follow Up Time: 1 week

## 2020-07-21 NOTE — DISCHARGE NOTE PROVIDER - NSDCCAREPROVSEEN_GEN_ALL_CORE_FT
[facility] Medicine, Team 7  [facility] Palliative Care, Team  [facility] Pulmonary, Team

## 2020-07-21 NOTE — PROGRESS NOTE ADULT - PROBLEM SELECTOR PLAN 3
-Severe dysfunction of LV noted on TTE.   -Continue to hold diuretics in setting of hypernatremia.   -Monitor Is and Os.  -Add beta blocker once able to take PO medications.    -Start symbicort BID in lieu of elipta (home medication).   -Duonebs PRN. -Severe dysfunction of LV noted on TTE.   -Continue to hold diuretics in setting of hypernatremia.   -Monitor Is and Os.  -Add beta blocker once able to take PO medications.    COPD:  -Continue symbicort BID in lieu of elipta (home medication).   -Duonebs PRN.  -PNA management as above

## 2020-07-21 NOTE — PROGRESS NOTE ADULT - PROBLEM SELECTOR PLAN 9
-hold xarelto in setting of NPO and pending MRI C-spine.   -If no evidence for epidural collection, will resume once able to tolerate PO.

## 2020-07-21 NOTE — PROGRESS NOTE ADULT - ATTENDING COMMENTS
Patient seen and examined with the housestaff team this morning during rounds.    On exam, patient appears to be a little bit more alert. Not grimacing as much. Answers all questions with a "Yes". Labs demonstrate interval improvement in sodium level, stable creatinine, adequate fingersticks, and no new leukocytosis. O2 requirements also decreasing during the daytime.    Overall, her prognosis remains guarded, but seems to be improving closer to her baseline. Will re-attempt to obtain MRI of C-spine today to further classify her C1 fracture and make a decision on when it may be safe to resume systemic anticoagulation. Continue IV hydration and hold diuretics in setting of hypernatremia; her stable respiratory status makes me less concerned about an acute CHF exacerbation at this time.    Exam otherwise notable for abdominal skin tears which appear related to adhesive use. Nursing aware, local management.    Palliative care consulted to aid in goals of care discussion and disposition planning. May benefit from PCU for symptom management if encephalopathy and dysphagia persists, in setting of advanced heart failure and likely underlying dementia with debility. DNR/DNI.

## 2020-07-21 NOTE — DISCHARGE NOTE PROVIDER - NSDCCPCAREPLAN_GEN_ALL_CORE_FT
PRINCIPAL DISCHARGE DIAGNOSIS  Diagnosis: Acute on chronic congestive heart failure, unspecified heart failure type  Assessment and Plan of Treatment:       SECONDARY DISCHARGE DIAGNOSES  Diagnosis: Closed nondisplaced fracture of posterior arch of first cervical vertebra, initial encounter  Assessment and Plan of Treatment: PRINCIPAL DISCHARGE DIAGNOSIS  Diagnosis: Acute on chronic congestive heart failure, unspecified heart failure type  Assessment and Plan of Treatment: You were given water pills to take the liquid off of your body.      SECONDARY DISCHARGE DIAGNOSES  Diagnosis: Closed nondisplaced fracture of posterior arch of first cervical vertebra, initial encounter  Assessment and Plan of Treatment: PRINCIPAL DISCHARGE DIAGNOSIS  Diagnosis: Acute on chronic congestive heart failure, unspecified heart failure type  Assessment and Plan of Treatment: You were given water pills to take the liquid off of your body. Follow up with your primary doctor to adjust these medications to see when they need to be restarted.      SECONDARY DISCHARGE DIAGNOSES  Diagnosis: MARIAN (acute kidney injury)  Assessment and Plan of Treatment: You lab works showed you were dehydrated. You were given fluids to help hydrate you. Be sure to drink thickened fluids while you are at home and eat a pureed diet. Follow up with your primary care doctor in 1 week.    Diagnosis: COPD (chronic obstructive pulmonary disease)  Assessment and Plan of Treatment: Take you medications as prescribed. Please follow up with your primary care doctor.    Diagnosis: Closed nondisplaced fracture of posterior arch of first cervical vertebra, initial encounter  Assessment and Plan of Treatment:

## 2020-07-21 NOTE — DISCHARGE NOTE PROVIDER - PROVIDER TOKENS
FREE:[LAST:[Vinicius],FIRST:[Wolf],PHONE:[(   )    -],FAX:[(   )    -]] FREE:[LAST:[Vinicius],FIRST:[Wolf],PHONE:[(   )    -],FAX:[(   )    -],FOLLOWUP:[1 week],ESTABLISHEDPATIENT:[T]]

## 2020-07-21 NOTE — PROGRESS NOTE ADULT - PROBLEM SELECTOR PLAN 4
-Baseline prior to admission is alert, oriented to self only, able to ambulate with assistance, requires assistance with feeding  -Continue management of PNA/hypernatremia as described.   -Limit sedatives/narcotics as able. Attempt to maintain sleep-wake cycle. --Haloperidol PRN. Monitor QTc. -Baseline prior to admission is alert, oriented to self only, able to ambulate with assistance, requires assistance with feeding  -Continue management of PNA/hypernatremia as described.   -Limit sedatives/narcotics as able. Attempt to maintain sleep-wake cycle.  -Haloperidol PRN. Monitor QTc.

## 2020-07-21 NOTE — PROGRESS NOTE ADULT - PROBLEM SELECTOR PLAN 7
-Patient pulled out her NG tube out on 7/20  -Failed speech and swallow evaluation.  -Given encephalopathy the patient is at risk to pull out any subsequent NG tube  -Discussed risks and benefits or tube feeding with patient's son. Patient's son prefers to avoid NG tube placement, will revisit discussion today.  -Aspiration precautions.

## 2020-07-21 NOTE — CONSULT NOTE ADULT - ASSESSMENT
79 y/o F pmhx HFrEF, PE, COPD, DM2, schizoaffective disorder presenting for acute hypoxic/hypercapnic respiratory failure 2/2 pna vs CHF. Was treated with bipap in MICU. Course c/b c1 vertebral fracture with possible epidural hematoma. Currently being treated for CAP and given medications for metabolic encephalopathy. Palliative care consulted for GOC discussions due to advanced medical disease. Currently DNR/DNI.

## 2020-07-21 NOTE — DISCHARGE NOTE PROVIDER - NSDCFUADDAPPT_GEN_ALL_CORE_FT
Please follow up with your primary care doctor in 1 week    Please follow up with your cardiologist in 1 week.

## 2020-07-22 DIAGNOSIS — Z51.5 ENCOUNTER FOR PALLIATIVE CARE: ICD-10-CM

## 2020-07-22 DIAGNOSIS — R53.2 FUNCTIONAL QUADRIPLEGIA: ICD-10-CM

## 2020-07-22 LAB
ANION GAP SERPL CALC-SCNC: 8 MMOL/L — SIGNIFICANT CHANGE UP (ref 5–17)
BUN SERPL-MCNC: 19 MG/DL — SIGNIFICANT CHANGE UP (ref 7–23)
CALCIUM SERPL-MCNC: 9.2 MG/DL — SIGNIFICANT CHANGE UP (ref 8.4–10.5)
CHLORIDE SERPL-SCNC: 100 MMOL/L — SIGNIFICANT CHANGE UP (ref 96–108)
CO2 SERPL-SCNC: 37 MMOL/L — HIGH (ref 22–31)
CREAT SERPL-MCNC: 0.64 MG/DL — SIGNIFICANT CHANGE UP (ref 0.5–1.3)
GLUCOSE BLDC GLUCOMTR-MCNC: 109 MG/DL — HIGH (ref 70–99)
GLUCOSE BLDC GLUCOMTR-MCNC: 95 MG/DL — SIGNIFICANT CHANGE UP (ref 70–99)
GLUCOSE SERPL-MCNC: 97 MG/DL — SIGNIFICANT CHANGE UP (ref 70–99)
HCT VFR BLD CALC: 37 % — SIGNIFICANT CHANGE UP (ref 34.5–45)
HGB BLD-MCNC: 10.7 G/DL — LOW (ref 11.5–15.5)
MAGNESIUM SERPL-MCNC: 1.6 MG/DL — SIGNIFICANT CHANGE UP (ref 1.6–2.6)
MCHC RBC-ENTMCNC: 24.5 PG — LOW (ref 27–34)
MCHC RBC-ENTMCNC: 28.9 GM/DL — LOW (ref 32–36)
MCV RBC AUTO: 84.9 FL — SIGNIFICANT CHANGE UP (ref 80–100)
NRBC # BLD: 0 /100 WBCS — SIGNIFICANT CHANGE UP (ref 0–0)
PHOSPHATE SERPL-MCNC: 2.4 MG/DL — LOW (ref 2.5–4.5)
PLATELET # BLD AUTO: 177 K/UL — SIGNIFICANT CHANGE UP (ref 150–400)
POTASSIUM SERPL-MCNC: 3.7 MMOL/L — SIGNIFICANT CHANGE UP (ref 3.5–5.3)
POTASSIUM SERPL-SCNC: 3.7 MMOL/L — SIGNIFICANT CHANGE UP (ref 3.5–5.3)
RBC # BLD: 4.36 M/UL — SIGNIFICANT CHANGE UP (ref 3.8–5.2)
RBC # FLD: 15.9 % — HIGH (ref 10.3–14.5)
SODIUM SERPL-SCNC: 145 MMOL/L — SIGNIFICANT CHANGE UP (ref 135–145)
T4 SERPL-MCNC: 2.8 UG/DL — LOW
WBC # BLD: 7.16 K/UL — SIGNIFICANT CHANGE UP (ref 3.8–10.5)
WBC # FLD AUTO: 7.16 K/UL — SIGNIFICANT CHANGE UP (ref 3.8–10.5)

## 2020-07-22 PROCEDURE — 99233 SBSQ HOSP IP/OBS HIGH 50: CPT | Mod: GC

## 2020-07-22 RX ORDER — ENOXAPARIN SODIUM 100 MG/ML
40 INJECTION SUBCUTANEOUS DAILY
Refills: 0 | Status: DISCONTINUED | OUTPATIENT
Start: 2020-07-22 | End: 2020-07-24

## 2020-07-22 RX ORDER — INSULIN DETEMIR 100/ML (3)
4 INSULIN PEN (ML) SUBCUTANEOUS
Qty: 0 | Refills: 0 | DISCHARGE

## 2020-07-22 RX ORDER — SPIRONOLACTONE 25 MG/1
1 TABLET, FILM COATED ORAL
Qty: 0 | Refills: 0 | DISCHARGE

## 2020-07-22 RX ORDER — QUETIAPINE FUMARATE 200 MG/1
1 TABLET, FILM COATED ORAL
Qty: 0 | Refills: 0 | DISCHARGE

## 2020-07-22 RX ORDER — GEMFIBROZIL 600 MG
1 TABLET ORAL
Qty: 0 | Refills: 0 | DISCHARGE

## 2020-07-22 RX ORDER — SODIUM CHLORIDE 9 MG/ML
1000 INJECTION, SOLUTION INTRAVENOUS
Refills: 0 | Status: DISCONTINUED | OUTPATIENT
Start: 2020-07-22 | End: 2020-07-22

## 2020-07-22 RX ORDER — CHOLECALCIFEROL (VITAMIN D3) 125 MCG
1 CAPSULE ORAL
Qty: 0 | Refills: 0 | DISCHARGE

## 2020-07-22 RX ORDER — SITAGLIPTIN AND METFORMIN HYDROCHLORIDE 500; 50 MG/1; MG/1
1 TABLET, FILM COATED ORAL
Qty: 0 | Refills: 0 | DISCHARGE

## 2020-07-22 RX ORDER — RIVAROXABAN 15 MG-20MG
1 KIT ORAL
Qty: 0 | Refills: 0 | DISCHARGE

## 2020-07-22 RX ADMIN — BUDESONIDE AND FORMOTEROL FUMARATE DIHYDRATE 2 PUFF(S): 160; 4.5 AEROSOL RESPIRATORY (INHALATION) at 17:19

## 2020-07-22 RX ADMIN — SODIUM CHLORIDE 50 MILLILITER(S): 9 INJECTION, SOLUTION INTRAVENOUS at 13:58

## 2020-07-22 RX ADMIN — HALOPERIDOL DECANOATE 1 MILLIGRAM(S): 100 INJECTION INTRAMUSCULAR at 14:00

## 2020-07-22 RX ADMIN — ENOXAPARIN SODIUM 40 MILLIGRAM(S): 100 INJECTION SUBCUTANEOUS at 14:05

## 2020-07-22 RX ADMIN — BUDESONIDE AND FORMOTEROL FUMARATE DIHYDRATE 2 PUFF(S): 160; 4.5 AEROSOL RESPIRATORY (INHALATION) at 05:57

## 2020-07-22 RX ADMIN — PANTOPRAZOLE SODIUM 40 MILLIGRAM(S): 20 TABLET, DELAYED RELEASE ORAL at 13:59

## 2020-07-22 NOTE — PROGRESS NOTE ADULT - PROBLEM SELECTOR PLAN 3
-Severe dysfunction of LV noted on TTE.   -Continue to hold diuretics in setting of hypernatremia.   -Monitor Is and Os.  -Add beta blocker once able to take PO medications.    COPD:  -Continue symbicort BID in lieu of elipta (home medication).   -Duonebs PRN.  -PNA management as above -Severe dysfunction of LV noted on TTE.   -Continue to hold diuretics in setting of hypernatremia.   -Monitor Is and Os.  -Add beta blocker once able to take PO medications.    COPD:  -Continue symbicort BID in lieu of elipta (home medication).   -Duonebs PRN.  -Goal O2 sat 92-94%

## 2020-07-22 NOTE — PROGRESS NOTE ADULT - PROBLEM SELECTOR PLAN 6
Internal Medicine -Improving  -D5W IV drip as patient is NPO without NG tube.   -Trend BMP. Resolved with free water supplementation via IV.  -D/C D5W infusion today  -If plan is to start PO nutrition or place NG tube for enteral nutrition, will encourage free water/PO intake.  -If no plan to start PO nutrition, will maintain on low dose D5+1/2NS maintenance infusion, monitoring for fluid overload  -Continue to hold diuretics for now

## 2020-07-22 NOTE — PROGRESS NOTE ADULT - PROBLEM SELECTOR PLAN 2
-Continue BIPAP qhs    -O2 via NC during daytime, with goal O2 sat 94%.  -Monitor serum bicarb. -Continue BIPAP qhs    -O2 via NC during daytime, with goal O2 sat 94%.  -Monitor serum bicarb, clinical exam for hypercarbia

## 2020-07-22 NOTE — CHART NOTE - NSCHARTNOTEFT_GEN_A_CORE
Nutrition Follow-up  Patient seen for:     Source: comprehensive chart review, ( )     Hospital course as per chart: Pt is a ( ) yo ( ) with PMH of     Chart reviewed, events noted.    Diet:   Supplement:    Patient reports ( )    Encouraged PO intake as tolerated ( ).     Education provided:     PO intake:  < 50% [ ] 50-75% [ ]   % [ ]  other :    Source for PO intake:     Enteral /Parenteral Nutrition:     Current Weight:   % Weight Change:   Will continue to monitor and trend weights.     Pertinent Medications: MEDICATIONS  (STANDING):  budesonide 160 MICROgram(s)/formoterol 4.5 MICROgram(s) Inhaler 2 Puff(s) Inhalation two times a day  dextrose 5%. 1000 milliLiter(s) (50 mL/Hr) IV Continuous <Continuous>  dextrose 5%. 1000 milliLiter(s) (100 mL/Hr) IV Continuous <Continuous>  dextrose 50% Injectable 12.5 Gram(s) IV Push once  dextrose 50% Injectable 25 Gram(s) IV Push once  dextrose 50% Injectable 25 Gram(s) IV Push once  insulin lispro (HumaLOG) corrective regimen sliding scale   SubCutaneous every 6 hours  pantoprazole  Injectable 40 milliGRAM(s) IV Push daily    MEDICATIONS  (PRN):  albuterol/ipratropium for Nebulization. 3 milliLiter(s) Nebulizer four times a day PRN Shortness of Breath and/or Wheezing  bisacodyl Suppository 10 milliGRAM(s) Rectal daily PRN Constipation  dextrose 40% Gel 15 Gram(s) Oral once PRN Blood Glucose LESS THAN 70 milliGRAM(s)/deciLiter  glucagon  Injectable 1 milliGRAM(s) IntraMuscular once PRN Glucose <70 milliGRAM(s)/deciLiter  haloperidol    Injectable 1 milliGRAM(s) IV Push every 6 hours PRN agitation    Pertinent Labs:  07-21 Na147 mmol/L<H> Glu 164 mg/dL<H> K+ 4.5 mmol/L Cr  0.75 mg/dL BUN 25 mg/dL<H> 07-21 Phos 2.4 mg/dL<L> 07-17 Alb 3.0 g/dL<L>      Skin:   Edema:     Estimated Needs:   [ ] no change since previous assessment  [ ] recalculated:     Previous Nutrition Diagnosis:   Nutrition Diagnosis is [ ] ongoing  [ ] resolved [ ] not applicable   Care plan: [ ] in progress [ ] achieved  Being addressed with:      New Nutrition Diagnosis: [ ] not applicable    [ ] Inadequate Protein Energy Intake [ ]Inadequate Oral Intake [ ] Excessive Energy Intake     [ ] Underweight [ ] Increased Nutrient Needs [ ] Overweight/Obesity     [ ] Altered GI Function [ ] Unintended Weight Loss [ ] Food & Nutrition Related Knowledge Deficit[ ] Limited Adherence to nutrition related recommendations [ ] Malnutrition  [ ] other: Free text    Recommendations:  1)     Monitoring and Evaluation:     RD remains available upon request and will continue to follow-up per protocol.   Krystle Abdi MS RD CDN pager #[phone] Nutrition Follow-up  Patient seen for: Consult received for significant decrease of oral intake >3 days PTA.     Source: comprehensive chart review, ( )     Hospital course as per chart: Pt is an 79 yo female with PMH of COPD, DM2, CHF, schizoaffective disorder, who presented with AMS, admitted 7/15. Pt presented in shock with hypercarbic respiratory failure, admitted to MICU for ventilatory and pressure support. Transferred to medical floor ([address] [place] [address]) 7/19. "Imaging notable for CT head demonstrating nondisplaced C1 fx and findings suspicious for epidural hematoma, CT chest with diffuse ground glass opacity suspicious for pneumonia or CHF." Initially on tube feeds, pt pulled NG tube out on 7/20. Swallow bedside assessment 7/20 recommended NPO. Per Palliative (7/21): "Mr Annabelle's [pt's son] main concern is his mother receiving nutrition. He is open to ngt however understands that mother has been pulling out tube and she failed speech and swallow evaluation." Chart reviewed, events noted.    Diet: NPO    Patient reports ( )    Encouraged PO intake as tolerated ( ).     Education provided:     PO intake: NPO    Enteral /Parenteral Nutrition: N/A    Current Weight: 171.9 pounds (7/22 bed)  % Weight Change: ~4.9% weight loss since admission/dosing weight 180.7 pounds (7/15 bed)   Indicates clinically significant weight loss.   Noted currently with edema. Will continue to monitor and trend weights.     Pertinent Medications: MEDICATIONS  (STANDING):  budesonide 160 MICROgram(s)/formoterol 4.5 MICROgram(s) Inhaler 2 Puff(s) Inhalation two times a day  dextrose 5%. 1000 milliLiter(s) (50 mL/Hr) IV Continuous <Continuous>  dextrose 5%. 1000 milliLiter(s) (100 mL/Hr) IV Continuous <Continuous>  dextrose 50% Injectable 12.5 Gram(s) IV Push once  dextrose 50% Injectable 25 Gram(s) IV Push once  dextrose 50% Injectable 25 Gram(s) IV Push once  insulin lispro (HumaLOG) corrective regimen sliding scale   SubCutaneous every 6 hours  pantoprazole  Injectable 40 milliGRAM(s) IV Push daily    MEDICATIONS  (PRN):  albuterol/ipratropium for Nebulization. 3 milliLiter(s) Nebulizer four times a day PRN Shortness of Breath and/or Wheezing  bisacodyl Suppository 10 milliGRAM(s) Rectal daily PRN Constipation  dextrose 40% Gel 15 Gram(s) Oral once PRN Blood Glucose LESS THAN 70 milliGRAM(s)/deciLiter  glucagon  Injectable 1 milliGRAM(s) IntraMuscular once PRN Glucose <70 milliGRAM(s)/deciLiter  haloperidol    Injectable 1 milliGRAM(s) IV Push every 6 hours PRN agitation    Pertinent Labs:  07-21 Na147 mmol/L<H> Glu 164 mg/dL<H> K+ 4.5 mmol/L Cr  0.75 mg/dL BUN 25 mg/dL<H> 07-21 Phos 2.4 mg/dL<L> 07-17 Alb 3.0 g/dL<L>    Skin: +wound, no pressure injuries per flowsheets   Edema: 1+ generalized edema as per flowsheets (assessed 7/21)     Estimated Needs: no change since previous assessment  Based on upper IBW 45.5 kg   Estimated energy needs (25-30 kcal/kg): 7074-0920 kcal/day  Estimated protein needs (1.6-1.8 g/kg): 72-82 g/day    Previous Nutrition Diagnosis: None     New Nutrition Diagnosis: [ ] not applicable    [ ] Inadequate Protein Energy Intake [ ]Inadequate Oral Intake [ ] Excessive Energy Intake     [ ] Underweight [ ] Increased Nutrient Needs [ ] Overweight/Obesity     [ ] Altered GI Function [ ] Unintended Weight Loss [ ] Food & Nutrition Related Knowledge Deficit[ ] Limited Adherence to nutrition related recommendations [ ] Malnutrition  [ ] other: Free text    Recommendations:  1)     Monitoring and Evaluation:     RD remains available upon request and will continue to follow-up per protocol.   [facility] MS RD CDN pager #[phone] Nutrition Follow-up  Patient seen for: Consult received for significant decrease of oral intake >3 days PTA.     Source: comprehensive chart review    Hospital course as per chart: Pt is an 79 yo female with PMH of COPD, DM2, CHF, schizoaffective disorder, who presented with AMS, admitted 7/15. Pt presented in shock with hypercarbic respiratory failure, admitted to MICU for ventilatory and pressure support. Transferred to medical floor ([address] [place] [address]) 7/19. "Imaging notable for CT head demonstrating nondisplaced C1 fx and findings suspicious for epidural hematoma, CT chest with diffuse ground glass opacity suspicious for pneumonia or CHF." Initially on tube feeds, pt pulled NG tube out on 7/20. Swallow bedside assessment 7/20 recommended NPO. Per Palliative (7/21): "Mr Annabelle's [pt's son] main concern is his mother receiving nutrition. He is open to ngt however understands that mother has been pulling out tube and she failed speech and swallow evaluation." Chart reviewed, events noted.    Diet: NPO    Patient visited, lethargic, sleeping, Farsi-speaking. Remains NPO. No acute GI distress reported. Last BM 7/20 per flowsheets.     PO intake: NPO    Enteral /Parenteral Nutrition: N/A    Current Weight: 171.9 pounds (7/22 bed)  % Weight Change: ~4.9% weight loss since admission/dosing weight 180.7 pounds (7/15 bed)   Question accuracy of bed scale weights. Noted pt also currently with edema, CHF, some weight loss likely related to fluid shifts. Will continue to monitor and trend weights.     Pertinent Medications: MEDICATIONS  (STANDING):  budesonide 160 MICROgram(s)/formoterol 4.5 MICROgram(s) Inhaler 2 Puff(s) Inhalation two times a day  dextrose 5%. 1000 milliLiter(s) (50 mL/Hr) IV Continuous <Continuous>  dextrose 5%. 1000 milliLiter(s) (100 mL/Hr) IV Continuous <Continuous>  dextrose 50% Injectable 12.5 Gram(s) IV Push once  dextrose 50% Injectable 25 Gram(s) IV Push once  dextrose 50% Injectable 25 Gram(s) IV Push once  insulin lispro (HumaLOG) corrective regimen sliding scale   SubCutaneous every 6 hours  pantoprazole  Injectable 40 milliGRAM(s) IV Push daily    MEDICATIONS  (PRN):  albuterol/ipratropium for Nebulization. 3 milliLiter(s) Nebulizer four times a day PRN Shortness of Breath and/or Wheezing  bisacodyl Suppository 10 milliGRAM(s) Rectal daily PRN Constipation  dextrose 40% Gel 15 Gram(s) Oral once PRN Blood Glucose LESS THAN 70 milliGRAM(s)/deciLiter  glucagon  Injectable 1 milliGRAM(s) IntraMuscular once PRN Glucose <70 milliGRAM(s)/deciLiter  haloperidol    Injectable 1 milliGRAM(s) IV Push every 6 hours PRN agitation    Pertinent Labs:  07-21 Na147 mmol/L<H> Glu 164 mg/dL<H> K+ 4.5 mmol/L Cr  0.75 mg/dL BUN 25 mg/dL<H> 07-21 Phos 2.4 mg/dL<L> 07-17 Alb 3.0 g/dL<L>    Skin: +wound, no pressure injuries per flowsheets   Edema: 1+ generalized edema as per flowsheets (assessed 7/21)     Estimated Needs: no change since previous assessment  Based on upper IBW 45.5 kg   Estimated energy needs (25-30 kcal/kg): 3375-2468 kcal/day  Estimated protein needs (1.6-1.8 g/kg): 72-82 g/day    Previous Nutrition Diagnosis: None     New Nutrition Diagnosis: Inadequate protein-energy intake related to difficulty swallowing, pt pulled out NGT as evidenced by pt NPO at this time.  Goal: Pt to meet >75% of estimated nutritional needs during hospital stay.     Recommendations:  1) Defer diet to medical team/SLP recommendations/GOC.   - If NGT replaced and enteral nutrition resumed, recommend Vital AF, starting at 10 ml/hr advancing as tolerated to goal rate 45 ml/hr x 24 hours.   Regimen to provide 1080 ml total volume, 1296 kcal, 81 g protein and 876 ml free water. Provides 28 kcal/kg, 1.78 g protein based on upper IBW weight of 45.5 kg. Meets ~86% RDIs. Defer additional free water to team.   - RD remains available to make additional diet recommendations, nutrition to remain consistent with GOC  2) Suggest multivitamin supplementation if no medical contraindications     Monitoring and Evaluation: Monitor GOC/nutrition provision/PO intake, weight, labs, skin, GI status    RD remains available upon request and will continue to follow-up per protocol.   Krystle Abdi MS RD CDN pager #[phone]

## 2020-07-22 NOTE — PROGRESS NOTE ADULT - ASSESSMENT
81 y/o F pmhx HFrEF, PE, COPD, DM2, schizoaffective disorder presenting for acute hypoxic/hypercapnic respiratory failure 2/2 pna vs CHF. Was treated with bipap in MICU. Course c/b c1 vertebral fracture with possible epidural hematoma. Currently being treated for CAP and given medications for metabolic encephalopathy. Palliative care consulted for GOC discussions due to advanced medical disease. Currently DNR/DNI.     Hospice team spoke today with patient regarding home hospice services.  Information provided.

## 2020-07-22 NOTE — PROGRESS NOTE ADULT - PROBLEM SELECTOR PLAN 1
Appears to be improving with more alertness today but still not f/u commands and minimally verbal. May have component of Delirium.  Likely multifactorial (2/2 to infection, hypoxemia, metabolic issues).   Work up and Rx as per primary team.   Promote:   -Frequent reassurance and verbal orientation   -Family members or other familiar persons by his bedside.   -Delusions and hallucinations should be neither endorsed nor challenged.   -Physical restraints should be avoided. Alternatives to restraint use, such as constant observation (preferably by someone familiar to the patient such as a family member), may be more effective.  -PT eval and early ambulation if possible  -Move to a room with a window   -Use a [facility]  to talk to the patient.

## 2020-07-22 NOTE — PROGRESS NOTE ADULT - ASSESSMENT
80 Year old female, Farsi speaking, hx of chronic systolic congestive heart failure, obstructive sleep apnea, copd, schizoaffective disorder, pulmonary embolism presenting with lethargy and confusion. The patient presented in shock with hypercarbic respiratory failure. Stepped up to MICU for ventilatory and pressure support. Now stepped down to medical floor. Imaging notable for CT head demonstrating nondisplaced C1 fx and unable to R/O spinal epidural hematoma, CT chest with diffuse ground glass opacity suspicious for pneumonia or CHF. Labs notable for normal wbc, hypernatremia and hypercarbia with elevated bicarbonate

## 2020-07-22 NOTE — PROGRESS NOTE ADULT - SUBJECTIVE AND OBJECTIVE BOX
SUBJECTIVE AND OBJECTIVE: No acute events overnight. Pt responding yes to all questions though does seem to have improved mentation.   INTERVAL HPI/OVERNIGHT EVENTS:    DNR on chart: Yes  Yes    Allergies    No Known Allergies    Intolerances    MEDICATIONS  (STANDING):  budesonide 160 MICROgram(s)/formoterol 4.5 MICROgram(s) Inhaler 2 Puff(s) Inhalation two times a day  enoxaparin Injectable 40 milliGRAM(s) SubCutaneous daily    MEDICATIONS  (PRN):  albuterol/ipratropium for Nebulization. 3 milliLiter(s) Nebulizer four times a day PRN Shortness of Breath and/or Wheezing  bisacodyl Suppository 10 milliGRAM(s) Rectal daily PRN Constipation  haloperidol    Injectable 1 milliGRAM(s) IV Push every 6 hours PRN agitation      ITEMS UNCHECKED ARE NOT PRESENT    PRESENT SYMPTOMS: [ ]Unable to obtain due to poor mentation   Source if other than patient:  [ ]Family   [ ]Team     Pain:  [ ]yes [ ]no  QOL impact -   Location -                    Aggravating factors -  Quality -  Radiation -  Timing-  Severity (0-10 scale):  Minimal acceptable level (0-10 scale):     Dyspnea:                           [ ]Mild [ ]Moderate [ ]Severe  Anxiety:                             [ ]Mild [ ]Moderate [ ]Severe  Fatigue:                             [ ]Mild [ ]Moderate [ ]Severe  Nausea:                             [ ]Mild [ ]Moderate [ ]Severe  Loss of appetite:              [ ]Mild [ ]Moderate [ ]Severe  Constipation:                    [ ]Mild [ ]Moderate [ ]Severe    CPOT:    https://www.[facility].org/getattachment/[email or web address]/Critical-Care-Pain-Observation-Tool-(CPOT)    PAIN AD Score:	  http://geriatrictoolkit.[place].[facility]/cog/painad.pdf (Ctrl + left click to view)    Other Symptoms:  [ ]All other review of systems negative     Palliative Performance Status Version 2:         %      http://npcrc.org/files/news/palliative_performance_scale_ppsv2.pdf  PHYSICAL EXAM:  Vital Signs Last 24 Hrs  T(C): 36.4 (22 Jul 2020 13:00), Max: 36.4 (21 Jul 2020 21:05)  T(F): 97.5 (22 Jul 2020 13:00), Max: 97.6 (21 Jul 2020 21:05)  HR: 60 (22 Jul 2020 13:00) (59 - 84)  BP: 159/- (22 Jul 2020 13:00) (148/78 - 159/-)  BP(mean): --  RR: 16 (22 Jul 2020 13:00) (16 - 20)  SpO2: 94% (22 Jul 2020 13:00) (93% - 98%) I&O's Summary    21 Jul 2020 07:01  -  22 Jul 2020 07:00  --------------------------------------------------------  IN: 950 mL / OUT: 0 mL / NET: 950 mL       GENERAL:  [ ]Alert  [ ]Oriented x   [x ]Lethargic  [ ]Cachexia  [ ]Unarousable  [ ]Verbal  [ ]Non-Verbal [x] Obese   Behavioral:   [ ] Anxiety  [x ] Delirium [ ] Agitation [ ] Other  HEENT:  [ x]Normal   [ ]Dry mouth   [ ]ET Tube/Trach  [ ]Oral lesions  PULMONARY:   [x]Clear [ ]Tachypnea  [ ]Audible excessive secretions   [ ]Rhonchi        [ ]Right [ ]Left [ ]Bilateral  [ ]Crackles        [ ]Right [ ]Left [ ]Bilateral  [ ]Wheezing     [ ]Right [ ]Left [ ]Bilateral  [ ]Diminished breath sounds [ ]right [ ]left [ ]bilateral  CARDIOVASCULAR:    [x ]Regular [ ]Irregular [ ]Tachy  [ ]Darion [ ]Murmur [ ]Other  GASTROINTESTINAL:  [x ]Soft  [ ]Distended   [ ]+BS  [ ]Non tender [ ]Tender  [ ]PEG [ ]OGT/ NGT  Last BM: 7/19    GENITOURINARY:  [ ]Normal [ ] Incontinent   [ ]Oliguria/Anuria   [x ]Ralph  MUSCULOSKELETAL:   [ ]Normal   [ ]Weakness  [ ]Bed/Wheelchair bound [x ]Edema  NEUROLOGIC:   [ ]No focal deficits  [x ]Cognitive impairment  [ ]Dysphagia [ ]Dysarthria [ ]Paresis [ ]Other   SKIN:   [ ]Normal    [ x]Rash  [ ]Pressure ulcer(s)       Present on admission [ ]y [ ]n  [x] Abdominal wound covered.     CRITICAL CARE:  [ ]Shock Present  [ ]Septic [ ]Cardiogenic [ ]Neurologic [ ]Hypovolemic  [ ]Vasopressors [ ]Inotropes  [ ]Respiratory failure present [ ]Mechanical Ventilation [ ]Non-invasive ventilatory support [ ]High-Flow   [ ]Acute  [ ]Chronic [ ]Hypoxic  [ ]Hypercarbic [ ]Other  [ ]Other organ failure     LABS:                        10.7   7.16  )-----------( 177      ( 22 Jul 2020 09:24 )             37.0   07-22    145  |  100  |  19  ----------------------------<  97  3.7   |  37<H>  |  0.64    Ca    9.2      22 Jul 2020 09:24  Phos  2.4     07-22  Mg     1.6     07-22          RADIOLOGY & ADDITIONAL STUDIES:    Protein Calorie Malnutrition Present: [ ]mild [ ]moderate [ ]severe [ ]underweight [ ]morbid obesity  https://www.andeal.org/vault/2440/web/files/ONC/Table_Clinical%20Characteristics%20to%20Document%20Malnutrition-White%20JV%20et%20al%202012.pdf      Weight (kg): 82 (07-15-20 @ 06:30)    [ ]PPSV2 < or = 30%  [ ]significant weight loss [ ]poor nutritional intake [ ]anasarca   Albumin, Serum: 3.0 g/dL (07-17-20 @ 00:27)   [ ]Artificial Nutrition    REFERRALS:   [ ]Chaplaincy  [ ]Hospice  [ ]Child Life  [ ]Social Work  [ ]Case management [ ]Holistic Therapy     Goals of Care Document:  YANNA Gresham (07-22-20 @ 16:02)  Goals of Care Conversation:   Participants:  · Participants  Family; Staff    Advance Directives:  · Caregiver:  information could not be obtained    Conversation Discussion:  · Conversation  Hospice Referral  · Conversation Details  Home hospice referral received. [facility] RN spoke at great length with patient's son/HCP, Miguel Angel ([phone]) regarding home hospice. At end of second conversation, son Miguel Angel stated that he does not want hospice, that he and his family are able to take care of all of patient's needs at home (wife is CD-PAP aide) and that he would prefer to have a nurse come to the home "one or two times" just to make sure that his mother is settled in at home. This writer gave Miguel Angel the main phone number for [facility], should he change his mind in the future.    Collaborated with ANGEL Mckenzie regarding the above - requested possible home care be arranged for a few visits, as per patient's son's wishes.     Iveth Gresham RN  [phone]      Electronic Signatures:  Iveth Gresham (MELISA)  (Signed 22-Jul-2020 16:06)  	Authored: Goals of Care Conversation      Last Updated: 22-Jul-2020 16:06 by Iveth Gresham (MELISA) Trista  ID # [id number]  SUBJECTIVE AND OBJECTIVE:  Pt responding yes to all questions though does seem to be more alert today.   INTERVAL HPI/OVERNIGHT EVENTS: No acute events overnight    DNR on chart: Yes  Yes    Allergies    No Known Allergies    Intolerances    MEDICATIONS  (STANDING):  budesonide 160 MICROgram(s)/formoterol 4.5 MICROgram(s) Inhaler 2 Puff(s) Inhalation two times a day  enoxaparin Injectable 40 milliGRAM(s) SubCutaneous daily    MEDICATIONS  (PRN):  albuterol/ipratropium for Nebulization. 3 milliLiter(s) Nebulizer four times a day PRN Shortness of Breath and/or Wheezing  bisacodyl Suppository 10 milliGRAM(s) Rectal daily PRN Constipation  haloperidol    Injectable 1 milliGRAM(s) IV Push every 6 hours PRN agitation      ITEMS UNCHECKED ARE NOT PRESENT    PRESENT SYMPTOMS: [x ]Unable to obtain due to poor mentation   Source if other than patient:  [ ]Family   [ ]Team     Pain:  [ ]yes [x ]no  QOL impact -   Location -                    Aggravating factors -  Quality -  Radiation -  Timing-  Severity (0-10 scale):  Minimal acceptable level (0-10 scale):     Dyspnea:                           [ ]Mild [ ]Moderate [ ]Severe  Anxiety:                             [ ]Mild [ ]Moderate [ ]Severe  Fatigue:                             [ ]Mild [ ]Moderate [ ]Severe  Nausea:                             [ ]Mild [ ]Moderate [ ]Severe  Loss of appetite:              [ ]Mild [ ]Moderate [ ]Severe  Constipation:                    [ ]Mild [ ]Moderate [ ]Severe    CPOT:    https://www.[facility].org/getattachment/[email or web address]/Critical-Care-Pain-Observation-Tool-(CPOT)    PAIN AD Score:	0  http://geriatrictoolkit.[place].[facility]/cog/painad.pdf (Ctrl + left click to view)    Other Symptoms:  [ ]All other review of systems negative     Palliative Performance Status Version 2:  20       %      http://npcrc.org/files/news/palliative_performance_scale_ppsv2.pdf  PHYSICAL EXAM:  Vital Signs Last 24 Hrs  T(C): 36.4 (22 Jul 2020 13:00), Max: 36.4 (21 Jul 2020 21:05)  T(F): 97.5 (22 Jul 2020 13:00), Max: 97.6 (21 Jul 2020 21:05)  HR: 60 (22 Jul 2020 13:00) (59 - 84)  BP: 159/- (22 Jul 2020 13:00) (148/78 - 159/-)  BP(mean): --  RR: 16 (22 Jul 2020 13:00) (16 - 20)  SpO2: 94% (22 Jul 2020 13:00) (93% - 98%) I&O's Summary    21 Jul 2020 07:01  -  22 Jul 2020 07:00  --------------------------------------------------------  IN: 950 mL / OUT: 0 mL / NET: 950 mL       GENERAL:  [ ]Alert  [ ]Oriented x   [x ]Lethargic  [ ]Cachexia  [ ]Unarousable  [ ]Verbal  [ ]Non-Verbal [x] Obese   Behavioral:   [ ] Anxiety  [x ] Delirium [ ] Agitation [ ] Other  HEENT:  [ x]Normal   [ ]Dry mouth   [ ]ET Tube/Trach  [ ]Oral lesions  PULMONARY:   [x]Clear [ ]Tachypnea  [ ]Audible excessive secretions   [ ]Rhonchi        [ ]Right [ ]Left [ ]Bilateral  [ ]Crackles        [ ]Right [ ]Left [ ]Bilateral  [ ]Wheezing     [ ]Right [ ]Left [ ]Bilateral  [ ]Diminished breath sounds [ ]right [ ]left [ ]bilateral  CARDIOVASCULAR:    [x ]Regular [ ]Irregular [ ]Tachy  [ ]Darion [ ]Murmur [ ]Other  GASTROINTESTINAL:  [x ]Soft  [ ]Distended   [ ]+BS  [ ]Non tender [ ]Tender  [ ]PEG [ ]OGT/ NGT  Last BM: 7/20    GENITOURINARY:  [ ]Normal [ ] Incontinent   [ ]Oliguria/Anuria   [x ]Ralph  MUSCULOSKELETAL:   [ ]Normal   [ ]Weakness  [ ]Bed/Wheelchair bound [x ]Edema  NEUROLOGIC:   [ ]No focal deficits  [x ]Cognitive impairment  [ ]Dysphagia [ ]Dysarthria [ ]Paresis [ ]Other   SKIN:   [ ]Normal    [ x]Rash  [ ]Pressure ulcer(s)       Present on admission [ ]y [ ]n  [x] Abdominal wound covered.     CRITICAL CARE:  [ ]Shock Present  [ ]Septic [ ]Cardiogenic [ ]Neurologic [ ]Hypovolemic  [ ]Vasopressors [ ]Inotropes  [ ]Respiratory failure present [ ]Mechanical Ventilation [ ]Non-invasive ventilatory support [ ]High-Flow   [ ]Acute  [ ]Chronic [ ]Hypoxic  [ ]Hypercarbic [ ]Other  [ ]Other organ failure     LABS:                        10.7   7.16  )-----------( 177      ( 22 Jul 2020 09:24 )             37.0   07-22    145  |  100  |  19  ----------------------------<  97  3.7   |  37<H>  |  0.64    Ca    9.2      22 Jul 2020 09:24  Phos  2.4     07-22  Mg     1.6     07-22          RADIOLOGY & ADDITIONAL STUDIES:  Reviewed   Protein Calorie Malnutrition Present: [ ]mild [ ]moderate [ ]severe [ ]underweight [ ]morbid obesity  https://www.andeal.org/vault/2440/web/files/ONC/Table_Clinical%20Characteristics%20to%20Document%20Malnutrition-White%20JV%20et%20al%202012.pdf      Weight (kg): 82 (07-15-20 @ 06:30)    [ ]PPSV2 < or = 30%  [ ]significant weight loss [ ]poor nutritional intake [ ]anasarca   Albumin, Serum: 3.0 g/dL (07-17-20 @ 00:27)   [ ]Artificial Nutrition    REFERRALS:   [ ]Chaplaincy  [ ]Hospice  [ ]Child Life  [ ]Social Work  [ ]Case management [ ]Holistic Therapy     Goals of Care Document:  YANNA Gresham (07-22-20 @ 16:02)  Goals of Care Conversation:   Participants:  · Participants  Family; Staff    Advance Directives:  · Caregiver:  information could not be obtained    Conversation Discussion:  · Conversation  Hospice Referral  · Conversation Details  Home hospice referral received. Writer/[facility] RN spoke at great length with patient's son/HCP, Miguel Angel ([phone]) regarding home hospice. At end of second conversation, son Miguel Angel stated that he does not want hospice, that he and his family are able to take care of all of patient's needs at home (wife is CD-PAP aide) and that he would prefer to have a nurse come to the home "one or two times" just to make sure that his mother is settled in at home. This writer gave Miguel Angel the main phone number for [facility], should he change his mind in the future.    Collaborated with ANGEL Mckenzie regarding the above - requested possible home care be arranged for a few visits, as per patient's son's wishes.     Iveth Gresham RN  [phone]      Electronic Signatures:  Iveth Gresham (MELISA)  (Signed 22-Jul-2020 16:06)  	Authored: Goals of Care Conversation      Last Updated: 22-Jul-2020 16:06 by Iveth Gresham (MELISA)

## 2020-07-22 NOTE — PROGRESS NOTE ADULT - PROBLEM SELECTOR PLAN 8
-Continue C-collar.   -Neurosurgery follow up.   -Cannot rule out epidural hematoma seen on CT. MR study limited by motion artifact  -Holding systemic anticoagulation pending adequate MR of C-spine for further classification. Will need to coordinate with MRI for optimal timing to ensure a quality study. -Continue C-collar.   -Neurosurgery follow up.   -Cannot rule out epidural hematoma seen on CT. MR study limited by motion artifact  -PT follow-up

## 2020-07-22 NOTE — PROGRESS NOTE ADULT - SUBJECTIVE AND OBJECTIVE BOX
Elías Martinez MD PGY1  Pager [phone]    Patient is a 80y old  Female who presents with a chief complaint of AMS (21 Jul 2020 16:53)      SUBJECTIVE/OVERNIGHT EVENTS: No events overnight. Had MRI yesterday. Patient has been voiding spontaneously. No signs of retention. More alert this AM. Says name when asked. Responds to all questions with "yes"    12 point review of symptoms negative except as above    MEDICATIONS  (STANDING):  budesonide 160 MICROgram(s)/formoterol 4.5 MICROgram(s) Inhaler 2 Puff(s) Inhalation two times a day  dextrose 5%. 1000 milliLiter(s) (50 mL/Hr) IV Continuous <Continuous>  dextrose 5%. 1000 milliLiter(s) (100 mL/Hr) IV Continuous <Continuous>  dextrose 50% Injectable 12.5 Gram(s) IV Push once  dextrose 50% Injectable 25 Gram(s) IV Push once  dextrose 50% Injectable 25 Gram(s) IV Push once  insulin lispro (HumaLOG) corrective regimen sliding scale   SubCutaneous every 6 hours  pantoprazole  Injectable 40 milliGRAM(s) IV Push daily    MEDICATIONS  (PRN):  albuterol/ipratropium for Nebulization. 3 milliLiter(s) Nebulizer four times a day PRN Shortness of Breath and/or Wheezing  bisacodyl Suppository 10 milliGRAM(s) Rectal daily PRN Constipation  dextrose 40% Gel 15 Gram(s) Oral once PRN Blood Glucose LESS THAN 70 milliGRAM(s)/deciLiter  glucagon  Injectable 1 milliGRAM(s) IntraMuscular once PRN Glucose <70 milliGRAM(s)/deciLiter  haloperidol    Injectable 1 milliGRAM(s) IV Push every 6 hours PRN agitation    CAPILLARY BLOOD GLUCOSE      POCT Blood Glucose.: 95 mg/dL (22 Jul 2020 06:36)  POCT Blood Glucose.: 109 mg/dL (22 Jul 2020 00:07)  POCT Blood Glucose.: 127 mg/dL (21 Jul 2020 17:55)  POCT Blood Glucose.: 130 mg/dL (21 Jul 2020 12:01)    I&O's Summary    21 Jul 2020 07:01  -  22 Jul 2020 07:00  --------------------------------------------------------  IN: 950 mL / OUT: 0 mL / NET: 950 mL          Vital Signs Last 24 Hrs  T(C): 36.4 (22 Jul 2020 04:40), Max: 36.4 (21 Jul 2020 13:54)  T(F): 97.5 (22 Jul 2020 04:40), Max: 97.6 (21 Jul 2020 21:05)  HR: 65 (22 Jul 2020 05:58) (55 - 84)  BP: 150/67 (22 Jul 2020 04:40) (139/47 - 150/67)  BP(mean): --  RR: 18 (22 Jul 2020 04:40) (18 - 20)  SpO2: 95% (22 Jul 2020 05:58) (93% - 99%)    PHYSICAL EXAM:  GENERAL: NAD, well-developed  HEENT: EOMI, PERRL,   NECK: Supple, No JVD  CHEST/LUNG: Clear to auscultation bilaterally; No wheeze  HEART: Regular rate and rhythm; No murmurs, rubs, or gallops  ABDOMEN: Soft, Nontender, Nondistended; Bowel sounds present  EXTREMITIES:  2+ Peripheral Pulses, No clubbing, cyanosis, or edema  PSYCH: Mood and affect appropriate  NEUROLOGY: A+Ox3, non-focal  SKIN: No rashes or lesions    LABS                        10.7   6.53  )-----------( 186      ( 21 Jul 2020 06:47 )             37.9     07-21    147<H>  |  102  |  25<H>  ----------------------------<  164<H>  4.5   |  37<H>  |  0.75    Ca    9.4      21 Jul 2020 06:46  Phos  2.4     07-21  Mg     1.7     07-21         15 Jul 2020 07:04 Troponin x     / CK 27 U/L / CKMB x     / CKMB Units 2.3 ng/mL          ( 20 Jul 2020 06:17 ) pH: 7.40  /  pCO2: 67    /  pO2: 47    / HCO3: 40    / Base Excess: 13.3  /  SaO2: 84              ( 19 Jul 2020 13:10 ) pH: 7.33  /  pCO2: 76    /  pO2: 99    / HCO3: 39    / Base Excess: 10.8  /  SaO2: 97              ( 17 Jul 2020 00:22 ) pH: 7.28  /  pCO2: 67    /  pO2: 89    / HCO3: 30    / Base Excess: 2.8   /  SaO2: 95                    RADIOLOGY & ADDITIONAL TESTS:      < from: MR Cervical Spine No Cont (07.21.20 @ 14:56) >  Markedly limited by motion. Many of the images are noninterpretable.    Vertebral body height and alignment grossly maintained. Alignment at the cervicomedullary junction grossly unremarkable.    No prevertebral or paravertebral edema appreciated.    Evaluation for abnormal intrinsiccord signal is markedly limited. Evaluation for epidural hematoma is markedly limited.    Multilevel degenerative changes. The degree of spinal canal stenosis and neural foraminal narrowing is poorly evaluated on this examination.      < end of copied text > Elías Martinez MD PGY1  Pager [phone]    Patient is a 80y old  Female who presents with a chief complaint of AMS (21 Jul 2020 16:53)      SUBJECTIVE/OVERNIGHT EVENTS: No events overnight. Had MRI yesterday. Patient has been voiding spontaneously. No signs of retention. More alert this AM. Says name when asked. Responds to all questions with "yes"    12 point review of symptoms negative except as above    MEDICATIONS  (STANDING):  budesonide 160 MICROgram(s)/formoterol 4.5 MICROgram(s) Inhaler 2 Puff(s) Inhalation two times a day  dextrose 5%. 1000 milliLiter(s) (50 mL/Hr) IV Continuous <Continuous>  dextrose 5%. 1000 milliLiter(s) (100 mL/Hr) IV Continuous <Continuous>  dextrose 50% Injectable 12.5 Gram(s) IV Push once  dextrose 50% Injectable 25 Gram(s) IV Push once  dextrose 50% Injectable 25 Gram(s) IV Push once  insulin lispro (HumaLOG) corrective regimen sliding scale   SubCutaneous every 6 hours  pantoprazole  Injectable 40 milliGRAM(s) IV Push daily    MEDICATIONS  (PRN):  albuterol/ipratropium for Nebulization. 3 milliLiter(s) Nebulizer four times a day PRN Shortness of Breath and/or Wheezing  bisacodyl Suppository 10 milliGRAM(s) Rectal daily PRN Constipation  dextrose 40% Gel 15 Gram(s) Oral once PRN Blood Glucose LESS THAN 70 milliGRAM(s)/deciLiter  glucagon  Injectable 1 milliGRAM(s) IntraMuscular once PRN Glucose <70 milliGRAM(s)/deciLiter  haloperidol    Injectable 1 milliGRAM(s) IV Push every 6 hours PRN agitation    CAPILLARY BLOOD GLUCOSE      POCT Blood Glucose.: 95 mg/dL (22 Jul 2020 06:36)  POCT Blood Glucose.: 109 mg/dL (22 Jul 2020 00:07)  POCT Blood Glucose.: 127 mg/dL (21 Jul 2020 17:55)  POCT Blood Glucose.: 130 mg/dL (21 Jul 2020 12:01)    I&O's Summary    21 Jul 2020 07:01  -  22 Jul 2020 07:00  --------------------------------------------------------  IN: 950 mL / OUT: 0 mL / NET: 950 mL          Vital Signs Last 24 Hrs  T(C): 36.4 (22 Jul 2020 04:40), Max: 36.4 (21 Jul 2020 13:54)  T(F): 97.5 (22 Jul 2020 04:40), Max: 97.6 (21 Jul 2020 21:05)  HR: 65 (22 Jul 2020 05:58) (55 - 84)  BP: 150/67 (22 Jul 2020 04:40) (139/47 - 150/67)  BP(mean): --  RR: 18 (22 Jul 2020 04:40) (18 - 20)  SpO2: 95% (22 Jul 2020 05:58) (93% - 99%)    PHYSICAL EXAM:  GENERAL: elderly female, asleep, appears in no distress  HEENT: enucleated orbits  NECK: cervical collar, +JVD  CHEST/LUNG: crackles bl posterior lung fields; No wheezes  HEART: Regular rate and rhythm; No murmurs, rubs, or gallops  ABDOMEN: Soft, Nontender, Nondistended; Bowel sounds present,   EXTREMITIES:  2+ Peripheral Pulses, nonpitting edema to shins  NEUROLOGY: Responsive to voice, Ox1, moves all extremities spontaneously  SKIN: excoriations on right lower abdomen    LABS                        10.7   6.53  )-----------( 186      ( 21 Jul 2020 06:47 )             37.9     07-21    147<H>  |  102  |  25<H>  ----------------------------<  164<H>  4.5   |  37<H>  |  0.75    Ca    9.4      21 Jul 2020 06:46  Phos  2.4     07-21  Mg     1.7     07-21         15 Jul 2020 07:04 Troponin x     / CK 27 U/L / CKMB x     / CKMB Units 2.3 ng/mL          ( 20 Jul 2020 06:17 ) pH: 7.40  /  pCO2: 67    /  pO2: 47    / HCO3: 40    / Base Excess: 13.3  /  SaO2: 84              ( 19 Jul 2020 13:10 ) pH: 7.33  /  pCO2: 76    /  pO2: 99    / HCO3: 39    / Base Excess: 10.8  /  SaO2: 97              ( 17 Jul 2020 00:22 ) pH: 7.28  /  pCO2: 67    /  pO2: 89    / HCO3: 30    / Base Excess: 2.8   /  SaO2: 95                    RADIOLOGY & ADDITIONAL TESTS:      < from: MR Cervical Spine No Cont (07.21.20 @ 14:56) >  Markedly limited by motion. Many of the images are noninterpretable.    Vertebral body height and alignment grossly maintained. Alignment at the cervicomedullary junction grossly unremarkable.    No prevertebral or paravertebral edema appreciated.    Evaluation for abnormal intrinsiccord signal is markedly limited. Evaluation for epidural hematoma is markedly limited.    Multilevel degenerative changes. The degree of spinal canal stenosis and neural foraminal narrowing is poorly evaluated on this examination.      < end of copied text >

## 2020-07-22 NOTE — PROGRESS NOTE ADULT - ATTENDING COMMENTS
Patient seen and examined with the team during rounds this morning on 5M.    Clinically, she appears stable. O2 sats have been holding in mid-low 90s on 2-3 liters via nasal cannula. Her mental status is slowly improving, and she is attempting to answer more questions in her native Farsi. She does not appear uncomfortable. Now voiding spontaneously.    She has completed her course of antibiotics for pneumonia. Her hypernatremia is resolved. Has not received IV haloperidol since noon yesterday and appears calm. Her volume status is stable and she does not appear to be in pulmonary edema.    At present, her biggest issue is nutritional status. She has been NPO for the last 2-3 days after self-removing her NG tube. We have held off on replacing it as at first family was hesitant. As mental status is slowly improving, will attempt S+S re-evaluation today. If recommendations remain for non-oral means of nutrition, we can replace the NG tube to provide such. That said, given she will be in C-collar for extended period of time, her blindness, and likely underlying dementia, i'm not sure how much improvement in her swallowing mechanism is realistically possible. Would not pursue PEG feeds or TPN at this time. Palliative care consult appreciated. Patient meets criteria for hospice care.    MRI of C-Spine yesterday severely limited due to motion artifact. Obtaining a better study will be difficult to coordinate in setting of dementia/encephalopathy. Neurosurgery f/u requested. Epidural hematoma is still a diagnostic consideration, and as such, will hold off on systemic anticoagulation at this time. Will re-evaluate based on risks/benefits as time goes on.

## 2020-07-22 NOTE — PROGRESS NOTE ADULT - PROBLEM SELECTOR PLAN 6
Will sing off.  Please call us back if needing further help with GOC, ACP, or symptoms.         Richard Branch MD   Geriatrics and Palliative Care (GAP) Consult Service    of Geriatric and Palliative Medicine  [facility]      Please page the following number for clinical matters between the hours of 9 am and 5 pm from Monday through Friday : [phone]    After 5pm and on weekends, please see the contact information below:    In the event of newly developing, evolving, or worsening symptoms, please contact the Palliative Medicine team via pager (if the patient is at [facility] #[phone] or if the patient is at [facility] #[phone]) The Geriatric and Palliative Medicine service has coverage 24 hours a day/ 7 days a week to provide medical recommendations regarding symptom management needs via telephone

## 2020-07-22 NOTE — CHART NOTE - NSCHARTNOTEFT_GEN_A_CORE
Chart reviewed:  Pt is an 81 yo female with PMH of COPD, DM2, CHF, schizoaffective disorder, who presented with AMS, admitted 7/15. Pt presented in shock with hypercarbic respiratory failure, admitted to MICU for ventilatory and pressure support. Transferred to medical floor ([address] [place] [address]) 7/19. "Imaging notable for CT head demonstrating nondisplaced C1 fx and findings suspicious for epidural hematoma, CT chest with diffuse ground glass opacity suspicious for pneumonia or CHF."    Dysphagia hx: Swallow bedside assessment 7/20 rec for NPO. Palliative care following.   Initially on tube feeds, pt pulled NG tube out on 7/20. Plan for re-evaluation on 7/22.     Re-assessment completed this date to determine least restrictive diet. Team indicated pt is more awake/alert and responsive.       The patient was encountered in bed, eyes closed w/ whispering noted intermittently. Repositioned upright in bed w/ RN assist.  unavailable via phone despite x3 attempts. No family present. C-collar (+). Pt appearing calm and comfortable. No signs of distress. RA (+). Offered pt nectar and honey thick liquids via tsp and puree-thick solids via tsp. Orientation/reception was adequate. Aware of stimulus/tsp being presented to labial surface. Provided tactile stimulation (tsp to lips) to alert pt to p.o. throughout trials, which proved effective, as pt with immediate opening of oral cavity. Reduced however effective spoon stripping w/ frequent slurping of bolus from tsp. Adequate manipulation and A-P transport present. Pharyngeal phase c/b suspected mild delay in swallow function, leading to s/s of aspiration nectar thick liquids via tsp. No s/s of aspiration w/ 3 oz of thick puree via tsp and honey thick liquids 4oz consumed.  Pt presents with an mita-pharyngeal dysphagia. Purposeful proactive rounding reinforced and 5 Ps addressed. Pt left in no distress.     D/w nursing and MD Elías YOST    Recommendations   Dysphagia 1 and honey thick liquids via tsp   NO STRAW  NO CUP  Medication to be crushed  1:1 w/ all po intake  Monitor tolerance   Aspiration precautions   Do not feed if not awake/alert   Monitor for oral pocketing     Will follow up on 7/23 to ensure diet tolerance and to provide education to family       Cinthia Ramos MS CCC-SLP pager [phone]

## 2020-07-22 NOTE — CHART NOTE - NSCHARTNOTEFT_GEN_A_CORE
Spoke with neuroradiologist, MRI and CT c-spine reviewed, c-spine fracture looks to be potentially subacute, 6mm overhang, 1.4mm SOUMYA, ligaments look intact based on T2 (STIR unreadable due to motion), does not appear unstable. No compression or hematoma is evident on MRI.   -Soft collar for pain  -Follow-up outpatient in 1-2 weeks with Dr. Hernandez  While there is no absolute neurosurgical contraindication to systemic anti coagulation, there does exist an increased risk of intracranial hemorrhage which can result in significant morbidity including but not limited to headache, seizure, stroke, paralysis, and in severe cases even death.    The risks and benefits of starting systemic anticoagulation must be considered carefully in the setting of the patients medical history and current clinical condition.    If AC is started...  - Recommend NO bolus with low ptt goal of ~60-65  - Recommend obtaining follow up CTH after patient is therapeutic and to notify neurosurgery immediately with any changes in the patients neurologic exam Spoke with neuroradiologist, MRI and CT c-spine reviewed, c-spine fracture looks to be potentially subacute, 6mm overhang, 1.4mm SOUMYA, ligaments look intact based on T2 (STIR unreadable due to motion), does not appear unstable. No compression or hematoma is evident on MRI.   -Soft collar for pain, rigid collar not needed  -Follow-up outpatient in 1-2 weeks with Dr. Hernandez  While there is no absolute neurosurgical contraindication to systemic anti coagulation, there does exist an increased risk of intracranial hemorrhage which can result in significant morbidity including but not limited to headache, seizure, stroke, paralysis, and in severe cases even death.    The risks and benefits of starting systemic anticoagulation must be considered carefully in the setting of the patients medical history and current clinical condition.    If AC is started...  - Recommend NO bolus with low ptt goal of ~60-65  - Recommend obtaining follow up CTH after patient is therapeutic and to notify neurosurgery immediately with any changes in the patients neurologic exam

## 2020-07-22 NOTE — PROGRESS NOTE ADULT - PROBLEM SELECTOR PLAN 9
-hold xarelto in setting of NPO and pending MRI C-spine.   -If no evidence for epidural collection, will resume once able to tolerate PO. -hold xarelto in setting of NPO and inability to r/o epidural hematoma  -neurosurgery follow-up

## 2020-07-22 NOTE — PROGRESS NOTE ADULT - PROBLEM SELECTOR PLAN 4
-Baseline prior to admission is alert, oriented to self only, able to ambulate with assistance, requires assistance with feeding  -Continue management of PNA/hypernatremia as described.   -Limit sedatives/narcotics as able. Attempt to maintain sleep-wake cycle.  -Haloperidol PRN. Monitor QTc. -Baseline prior to admission is alert, oriented to self only, able to ambulate with assistance, requires assistance with feeding  -Continue management of acute medical issues as described.   -Limit sedatives/narcotics as able. Attempt to maintain sleep-wake cycle.  -Haloperidol PRN for agitation. Monitor QTc.

## 2020-07-22 NOTE — PROGRESS NOTE ADULT - PROBLEM SELECTOR PLAN 1
-Completed CTX 1g IV daily 7 day course. s/p azithromycin for atypical pneumonia.   -MRSA PCR negative, Legionella urine negative,  -Continue O2 via nasal cannula during daytime, weaning as able to goal O2 sat 94%.  -Secretion mobilization PRN -Completed CTX 1g IV daily 7 day course. s/p azithromycin for atypical pneumonia.   -Continue O2 via nasal cannula during daytime, weaning as able to goal O2 sat 94%.  -Secretion mobilization PRN

## 2020-07-22 NOTE — GOALS OF CARE CONVERSATION - ADVANCED CARE PLANNING - CONVERSATION DETAILS
Home hospice referral received. [facility] RN spoke at great length with patient's son/HCP, Miguel Angel ([phone]) regarding home hospice. At end of second conversation, son Miguel Angel stated that he does not want hospice, that he and his family are able to take care of all of patient's needs at home (wife is CD-PAP aide) and that he would prefer to have a nurse come to the home "one or two times" just to make sure that his mother is settled in at home. This writer gave Miguel Angel the main phone number for [facility], should he change his mind in the future.    Collaborated with ANGEL Mckenzie regarding the above - requested possible home care be arranged for a few visits, as per patient's son's wishes.     Iveth Gresham, RN  [phone]

## 2020-07-22 NOTE — PROGRESS NOTE ADULT - PROBLEM SELECTOR PLAN 5
Patient is already DNR.  On 7/21, it was d/w her son, Miguel Angel Alanis,  about the patient's hospital course and medical treatment. Mr Alanis stated he has one brother, however this brother has a disability and is incapable of participating in making medical decisions for his mother, therefore the sole decision maker is Miguel Angel Alanis.     Prior to hospitalization, pt was having worsening strength and gradually declining, especially with memory. Even so, Mr Alanis's goal is for his mother is to return home as she was prior to her admission. Mr. Alanis is understanding of his mother's multiple medical comorbidities and that this may not be possible. Therefore GOC are towards trying to treat the patient's acute issues so she can return home.     Hospice services were offered and declined (See hospice GOC note dated 7/22). At this point plan is home with home care.

## 2020-07-22 NOTE — PROGRESS NOTE ADULT - PROBLEM SELECTOR PLAN 7
-Patient pulled out her NG tube out on 7/20  -Failed initial speech and swallow evaluation. Reevaluation planned for 7/22  -Given encephalopathy the patient is at risk to pull out any subsequent NG tube  -Discussed risks and benefits or tube feeding with patient's son. Patient's son prefers to avoid NG tube placement  -Aspiration precautions. -Patient pulled out her NG tube out on 7/20  -Failed initial speech and swallow evaluation. Reevaluation planned for 7/22  -Given encephalopathy the patient is at risk to pull out any subsequent NG tube  -F/U with patient's son to discuss nutritional options pending the above re-evaluation. If accepting the risks of PO nutrition despite aspiration risk, will proceed with such in line with goals of care. NG tube can be a temporary option if there is the potential for improved mental status.  -Aspiration precautions.

## 2020-07-23 LAB
ANION GAP SERPL CALC-SCNC: 10 MMOL/L — SIGNIFICANT CHANGE UP (ref 5–17)
BASE EXCESS BLDA CALC-SCNC: 12.1 MMOL/L — HIGH (ref -2–2)
BUN SERPL-MCNC: 23 MG/DL — SIGNIFICANT CHANGE UP (ref 7–23)
CALCIUM SERPL-MCNC: 9.1 MG/DL — SIGNIFICANT CHANGE UP (ref 8.4–10.5)
CHLORIDE SERPL-SCNC: 100 MMOL/L — SIGNIFICANT CHANGE UP (ref 96–108)
CO2 BLDA-SCNC: 41 MMOL/L — HIGH (ref 22–30)
CO2 SERPL-SCNC: 36 MMOL/L — HIGH (ref 22–31)
CREAT SERPL-MCNC: 0.85 MG/DL — SIGNIFICANT CHANGE UP (ref 0.5–1.3)
GLUCOSE BLDC GLUCOMTR-MCNC: 134 MG/DL — HIGH (ref 70–99)
GLUCOSE BLDC GLUCOMTR-MCNC: 179 MG/DL — HIGH (ref 70–99)
GLUCOSE SERPL-MCNC: 159 MG/DL — HIGH (ref 70–99)
HCO3 BLDA-SCNC: 39 MMOL/L — HIGH (ref 21–29)
HCT VFR BLD CALC: 37.3 % — SIGNIFICANT CHANGE UP (ref 34.5–45)
HGB BLD-MCNC: 10.5 G/DL — LOW (ref 11.5–15.5)
MAGNESIUM SERPL-MCNC: 1.6 MG/DL — SIGNIFICANT CHANGE UP (ref 1.6–2.6)
MCHC RBC-ENTMCNC: 24.1 PG — LOW (ref 27–34)
MCHC RBC-ENTMCNC: 28.2 GM/DL — LOW (ref 32–36)
MCV RBC AUTO: 85.7 FL — SIGNIFICANT CHANGE UP (ref 80–100)
NRBC # BLD: 0 /100 WBCS — SIGNIFICANT CHANGE UP (ref 0–0)
PCO2 BLDA: 66 MMHG — HIGH (ref 32–46)
PH BLDA: 7.39 — SIGNIFICANT CHANGE UP (ref 7.35–7.45)
PHOSPHATE SERPL-MCNC: 2.5 MG/DL — SIGNIFICANT CHANGE UP (ref 2.5–4.5)
PLATELET # BLD AUTO: 189 K/UL — SIGNIFICANT CHANGE UP (ref 150–400)
PO2 BLDA: 92 MMHG — SIGNIFICANT CHANGE UP (ref 74–108)
POTASSIUM SERPL-MCNC: 3.4 MMOL/L — LOW (ref 3.5–5.3)
POTASSIUM SERPL-SCNC: 3.4 MMOL/L — LOW (ref 3.5–5.3)
RBC # BLD: 4.35 M/UL — SIGNIFICANT CHANGE UP (ref 3.8–5.2)
RBC # FLD: 16.1 % — HIGH (ref 10.3–14.5)
SAO2 % BLDA: 97 % — HIGH (ref 92–96)
SARS-COV-2 RNA SPEC QL NAA+PROBE: SIGNIFICANT CHANGE UP
SODIUM SERPL-SCNC: 146 MMOL/L — HIGH (ref 135–145)
WBC # BLD: 7.78 K/UL — SIGNIFICANT CHANGE UP (ref 3.8–10.5)
WBC # FLD AUTO: 7.78 K/UL — SIGNIFICANT CHANGE UP (ref 3.8–10.5)

## 2020-07-23 PROCEDURE — 99233 SBSQ HOSP IP/OBS HIGH 50: CPT | Mod: GC

## 2020-07-23 PROCEDURE — 99232 SBSQ HOSP IP/OBS MODERATE 35: CPT | Mod: GC

## 2020-07-23 RX ORDER — HALOPERIDOL DECANOATE 100 MG/ML
1 INJECTION INTRAMUSCULAR EVERY 6 HOURS
Refills: 0 | Status: DISCONTINUED | OUTPATIENT
Start: 2020-07-23 | End: 2020-07-28

## 2020-07-23 RX ORDER — POTASSIUM CHLORIDE 20 MEQ
40 PACKET (EA) ORAL ONCE
Refills: 0 | Status: COMPLETED | OUTPATIENT
Start: 2020-07-23 | End: 2020-07-23

## 2020-07-23 RX ORDER — METOPROLOL TARTRATE 50 MG
25 TABLET ORAL
Refills: 0 | Status: DISCONTINUED | OUTPATIENT
Start: 2020-07-23 | End: 2020-07-28

## 2020-07-23 RX ORDER — QUETIAPINE FUMARATE 200 MG/1
25 TABLET, FILM COATED ORAL AT BEDTIME
Refills: 0 | Status: DISCONTINUED | OUTPATIENT
Start: 2020-07-23 | End: 2020-07-28

## 2020-07-23 RX ADMIN — Medication 25 MILLIGRAM(S): at 18:10

## 2020-07-23 RX ADMIN — Medication 40 MILLIEQUIVALENT(S): at 11:42

## 2020-07-23 RX ADMIN — ENOXAPARIN SODIUM 40 MILLIGRAM(S): 100 INJECTION SUBCUTANEOUS at 11:42

## 2020-07-23 RX ADMIN — BUDESONIDE AND FORMOTEROL FUMARATE DIHYDRATE 2 PUFF(S): 160; 4.5 AEROSOL RESPIRATORY (INHALATION) at 18:02

## 2020-07-23 RX ADMIN — BUDESONIDE AND FORMOTEROL FUMARATE DIHYDRATE 2 PUFF(S): 160; 4.5 AEROSOL RESPIRATORY (INHALATION) at 05:44

## 2020-07-23 NOTE — PROGRESS NOTE ADULT - PROBLEM SELECTOR PLAN 9
-hold xarelto pending discussion with patient's son regarding risk vs benefits of AC in patient with fall risk but also with hx of pe and likely immobility  -neurosurgery follow-up -DNR/DNI in chart  -Plan is for patient to return home with care from family members, cm is following  -Palliative care f/u appreciated

## 2020-07-23 NOTE — CONSULT NOTE ADULT - ASSESSMENT
80 Year old female, Farsi speaking, hx of chronic systolic congestive heart failure, obstructive sleep apnea, copd, schizoaffective disorder, pulmonary embolism presenting with lethargy and confusion 2/2 to shock and hypercarbic respiratory failure, now on BIPAP overnight with nasal canula during the day.     #Acute on Chronic Respiratory failure   -Hx of COPD and sleep apnea, however, initial bicarb wnl, suggesting that patient is not a chronic retainer. elevated bicarb now represents patient compensating for her hypercarbia.   -c/w bipap QHS and NC during the day.   -recommend doing overnight pulse oximetry study. Patient should be on 2LNC overnight for 5 minutes. Please arrange with nursing staff and respiratory therapists.  -will POCUS patient to determine etiology of hypoxia and hypercarbia.     ***Please wait until final attending attestation*** 80 Year old female, Farsi speaking, hx of chronic systolic congestive heart failure, obstructive sleep apnea, copd, schizoaffective disorder, pulmonary embolism presenting with lethargy and confusion 2/2 to shock and hypercarbic respiratory failure, now on BIPAP overnight with nasal canula during the day.     #Acute on Chronic Respiratory failure   -Hx of COPD and sleep apnea, however, initial bicarb wnl, suggesting that patient is not a chronic retainer. elevated bicarb now represents patient compensating for her hypercarbia.   -POCUS on 7/23 shows B-lines anterior up to upper lung bilaterally, likely fluid overloaded  -Hypoxia likely multifactorial, 2/2 to fluid overload and existing lung disease  -recommend doing overnight pulse oximetry study. Patient should be on 2LNC overnight for 5 minutes. Please arrange with nursing staff and respiratory therapists.  -Recommend diuresis with Lasix 40IV 1x and titrate to goal net -1L/day.   -Please ask family if they have CPAP machine at home.   -c/w bipap QHS and NC during the day.    Sherman Mcclellan, PGY-2  Internal Medicine  Pulmonary Consult Resident  Pager [phone]    ***Please wait until final attending attestation*** 80 Year old female, Farsi speaking, hx of chronic systolic congestive heart failure, obstructive sleep apnea, copd, schizoaffective disorder, pulmonary embolism presenting with lethargy and confusion 2/2 to shock and hypercarbic respiratory failure, now on BIPAP overnight with nasal canula during the day.     #Acute on Chronic Respiratory failure   -Hx of COPD and sleep apnea, however, initial bicarb wnl, suggesting that patient is not a chronic retainer. elevated bicarb now represents patient compensating for her hypercarbia.   -POCUS on 7/23 shows B-lines anterior up to upper lung bilaterally, likely fluid overloaded  -Hypoxia likely multifactorial, 2/2 to fluid overload and existing lung disease  -recommend doing overnight pulse oximetry study. Patient should be on 2LNC overnight for 5 minutes. Please arrange with nursing staff and respiratory therapists. Please stop study if patient is profoundly hypoxic.   -Recommend diuresis with Lasix 40IV 1x and titrate to goal net -1L/day.   -Please ask family if they have CPAP machine at home.   -c/w bipap QHS and NC during the day.    Sherman Mcclellan, PGY-2  Internal Medicine  Pulmonary Consult Resident  Pager [phone]    ***Please wait until final attending attestation***

## 2020-07-23 NOTE — PROGRESS NOTE ADULT - PROBLEM SELECTOR PLAN 4
-Baseline prior to admission is alert, oriented to self only, able to ambulate with assistance, requires assistance with feeding  -Continue management of acute medical issues as described.   -Limit sedatives/narcotics as able. Attempt to maintain sleep-wake cycle.  -Haloperidol PRN for agitation. Monitor QTc. -Seroquel PO QHS  -Haldol PRN.

## 2020-07-23 NOTE — PROGRESS NOTE ADULT - ATTENDING COMMENTS
Patient seen and examined on [address] [place] after morning rounds today.    She appears quite alert and is becoming increasingly conversant. In talking with nursing, she has quite the appetite and is tolerating her diet well. She is voiding without issue. She has self-removed her IV x 2 in the last 24 hours, but appears calm at this time.    Pulmonary f/u requested today to help us determine post-discharge oxygen needs, and whether non-invasive ventilation will need to be continued for chronic hypercarbia.  Palliative care f/u appreciated. Hospice services offered to son and declined.  Neurosurgery f/u also appreciated. C spine fracture appears subacute and not unstable. Will remove hard cervical collar per their recommendations.  Can resume xarelto on discharge if consistent with goals of care. There does not appear to be increased bleeding risk related to her c-spine fracture.  Discharge planning to home with PT per son's wishes, pending clarification of home O2 needs, as early as tomorrow.

## 2020-07-23 NOTE — PROGRESS NOTE ADULT - SUBJECTIVE AND OBJECTIVE BOX
Elías Martinez MD PGY1  Pager [phone]    Patient is a 80y old  Female who presents with a chief complaint of AMS (22 Jul 2020 16:07)      SUBJECTIVE/OVERNIGHT EVENTS: No events overnight. Patient is more awake this am, says her name, "yes" and "thank you".  Eating well as per nursing report.    12 point review of symptoms negative except as above    MEDICATIONS  (STANDING):  budesonide 160 MICROgram(s)/formoterol 4.5 MICROgram(s) Inhaler 2 Puff(s) Inhalation two times a day  enoxaparin Injectable 40 milliGRAM(s) SubCutaneous daily  QUEtiapine 25 milliGRAM(s) Oral at bedtime    MEDICATIONS  (PRN):  albuterol/ipratropium for Nebulization. 3 milliLiter(s) Nebulizer four times a day PRN Shortness of Breath and/or Wheezing  bisacodyl Suppository 10 milliGRAM(s) Rectal daily PRN Constipation  haloperidol    Injectable 1 milliGRAM(s) IntraMuscular every 6 hours PRN agitation    CAPILLARY BLOOD GLUCOSE      POCT Blood Glucose.: 134 mg/dL (23 Jul 2020 07:47)    I&O's Summary    22 Jul 2020 07:01  -  23 Jul 2020 07:00  --------------------------------------------------------  IN: 220 mL / OUT: 300 mL / NET: -80 mL          Vital Signs Last 24 Hrs  T(C): 36.3 (23 Jul 2020 11:15), Max: 36.9 (22 Jul 2020 20:41)  T(F): 97.4 (23 Jul 2020 11:15), Max: 98.5 (22 Jul 2020 20:41)  HR: 80 (23 Jul 2020 11:15) (60 - 83)  BP: 117/69 (23 Jul 2020 11:15) (117/69 - 159/-)  BP(mean): --  RR: 18 (23 Jul 2020 11:15) (16 - 20)  SpO2: 97% (23 Jul 2020 11:15) (90% - 98%)    PHYSICAL EXAM:  GENERAL: elderly female in bed, eyelids closed, no obvious distress  HEENT: enucleated orbits  NECK: Supple, No JVD  CHEST/LUNG: crackles bl  HEART: Regular rate and rhythm; No murmurs, rubs, or gallops  ABDOMEN: Soft, Nontender, Nondistended; Bowel sounds present  EXTREMITIES:  2+ Peripheral Pulses, No clubbing, cyanosis, or edema  PSYCH: Mood and affect appropriate  NEUROLOGY: arouses to voice, oriented x1,   SKIN: excoriations on abdomen    LABS                        10.5   7.78  )-----------( 189      ( 23 Jul 2020 09:58 )             37.3                         10.7   7.16  )-----------( 177      ( 22 Jul 2020 09:24 )             37.0     07-23    146<H>  |  100  |  23  ----------------------------<  159<H>  3.4<L>   |  36<H>  |  0.85  07-22    145  |  100  |  19  ----------------------------<  97  3.7   |  37<H>  |  0.64    Ca    9.1      23 Jul 2020 09:58  Ca    9.2      22 Jul 2020 09:24  Phos  2.5     07-23  Mg     1.6     07-23         15 Jul 2020 07:04 Troponin x     / CK 27 U/L / CKMB x     / CKMB Units 2.3 ng/mL          ( 20 Jul 2020 06:17 ) pH: 7.40  /  pCO2: 67    /  pO2: 47    / HCO3: 40    / Base Excess: 13.3  /  SaO2: 84              ( 19 Jul 2020 13:10 ) pH: 7.33  /  pCO2: 76    /  pO2: 99    / HCO3: 39    / Base Excess: 10.8  /  SaO2: 97              ( 17 Jul 2020 00:22 ) pH: 7.28  /  pCO2: 67    /  pO2: 89    / HCO3: 30    / Base Excess: 2.8   /  SaO2: 95                    RADIOLOGY & ADDITIONAL TESTS: Elías Martinez MD PGY1  Pager [phone]    Patient is a 80y old  Female who presents with a chief complaint of AMS (22 Jul 2020 16:07)      SUBJECTIVE/OVERNIGHT EVENTS: No events overnight. Patient is more awake this am, says her name, "yes" and "thank you".  Eating well as per nursing report.    12 point review of symptoms negative except as above    MEDICATIONS  (STANDING):  budesonide 160 MICROgram(s)/formoterol 4.5 MICROgram(s) Inhaler 2 Puff(s) Inhalation two times a day  enoxaparin Injectable 40 milliGRAM(s) SubCutaneous daily  QUEtiapine 25 milliGRAM(s) Oral at bedtime    MEDICATIONS  (PRN):  albuterol/ipratropium for Nebulization. 3 milliLiter(s) Nebulizer four times a day PRN Shortness of Breath and/or Wheezing  bisacodyl Suppository 10 milliGRAM(s) Rectal daily PRN Constipation  haloperidol    Injectable 1 milliGRAM(s) IntraMuscular every 6 hours PRN agitation    CAPILLARY BLOOD GLUCOSE      POCT Blood Glucose.: 134 mg/dL (23 Jul 2020 07:47)    I&O's Summary    22 Jul 2020 07:01  -  23 Jul 2020 07:00  --------------------------------------------------------  IN: 220 mL / OUT: 300 mL / NET: -80 mL          Vital Signs Last 24 Hrs  T(C): 36.3 (23 Jul 2020 11:15), Max: 36.9 (22 Jul 2020 20:41)  T(F): 97.4 (23 Jul 2020 11:15), Max: 98.5 (22 Jul 2020 20:41)  HR: 80 (23 Jul 2020 11:15) (60 - 83)  BP: 117/69 (23 Jul 2020 11:15) (117/69 - 159/-)  BP(mean): --  RR: 18 (23 Jul 2020 11:15) (16 - 20)  SpO2: 97% (23 Jul 2020 11:15) (90% - 98%)    PHYSICAL EXAM:  GENERAL: elderly female in bed, eyelids closed, no obvious distress  HEENT: enucleated orbits  NECK: Supple, No JVD  CHEST/LUNG: crackles bl  HEART: Regular rate and rhythm; No murmurs, rubs, or gallops  ABDOMEN: Soft, Nontender, Nondistended; Bowel sounds present  EXTREMITIES:  2+ Peripheral Pulses, No clubbing, cyanosis, or edema  PSYCH: Mood and affect appropriate  NEUROLOGY: arouses to voice, oriented x1,   SKIN: excoriations on abdomen    LABS                        10.5   7.78  )-----------( 189      ( 23 Jul 2020 09:58 )             37.3                         10.7   7.16  )-----------( 177      ( 22 Jul 2020 09:24 )             37.0     07-23    146<H>  |  100  |  23  ----------------------------<  159<H>  3.4<L>   |  36<H>  |  0.85  07-22    145  |  100  |  19  ----------------------------<  97  3.7   |  37<H>  |  0.64    Ca    9.1      23 Jul 2020 09:58  Ca    9.2      22 Jul 2020 09:24  Phos  2.5     07-23  Mg     1.6     07-23         15 Jul 2020 07:04 Troponin x     / CK 27 U/L / CKMB x     / CKMB Units 2.3 ng/mL          ( 20 Jul 2020 06:17 ) pH: 7.40  /  pCO2: 67    /  pO2: 47    / HCO3: 40    / Base Excess: 13.3  /  SaO2: 84              ( 19 Jul 2020 13:10 ) pH: 7.33  /  pCO2: 76    /  pO2: 99    / HCO3: 39    / Base Excess: 10.8  /  SaO2: 97              ( 17 Jul 2020 00:22 ) pH: 7.28  /  pCO2: 67    /  pO2: 89    / HCO3: 30    / Base Excess: 2.8   /  SaO2: 95          RADIOLOGY & ADDITIONAL TESTS: No new studies

## 2020-07-23 NOTE — PROGRESS NOTE ADULT - PROBLEM SELECTOR PLAN 2
-Continue BIPAP qhs    -O2 via NC during daytime, with goal O2 sat 94%.  -Monitor serum bicarb, clinical exam for hypercarbia  -Pulm consult for recs regarding Bipap or Cpap on discharge -Severe dysfunction of LV noted on TTE.   -Continue to hold diuretics in setting of hypernatremia.   -Monitor Is and Os.  -Holding home metoprolol succinate because it is noncrushable - will convert to tartrate dosing    COPD:  -Continue symbicort BID in lieu of elipta (home medication).   -Duonebs PRN.  -Goal O2 sat 92-94%

## 2020-07-23 NOTE — PROGRESS NOTE ADULT - PROBLEM SELECTOR PROBLEM 2
Acute on chronic respiratory failure with hypercapnia Acute on chronic systolic congestive heart failure

## 2020-07-23 NOTE — CONSULT NOTE ADULT - SUBJECTIVE AND OBJECTIVE BOX
CHIEF COMPLAINT: altered mental status    HPI:  80 yof, Farsi speaking, hx of chronic systolic congestive heart failure, obstructive sleep apnea, copd, schizoaffective disorder, pulmonary embolism presenting with lethargy and confusion. The patient presented in shock with hypercarbic respiratory failure. Stepped up to MICU for ventilatory and pressure support. Now stepped down to medical floor. CT chest with diffuse ground glass opacity suspicious for pneumonia or CHF.   Pulmonary consulted for need for BIPAP overnight.     Patient has completed a course of abx for PNA, on duonebs for COPD, and continued with BIPAP overnight. Patient has been requiring oxygen, around 4LNC, saturating 93-95%.     [place]  ID [id number] available for interview on 7/23. Patient is not responsive to any questions. Does not wish to speak at thismoment.     PAST MEDICAL & SURGICAL HISTORY:  COPD (chronic obstructive pulmonary disease)  Hypertension  Schizoaffective disorder  Diabetes mellitus  Congestive heart failure  H/O abdominal surgery: Unspecified      FAMILY HISTORY:      SOCIAL HISTORY: unable to obtain due to AMS    Allergies    No Known Allergies    Intolerances        HOME MEDICATIONS:  Home Medications:  albuterol 0.63 mg/3 mL (0.021%) inhalation solution: inhaled every 6 hours, As Needed (22 Jul 2020 14:40)  Calcium 600+D oral tablet: 1 tab(s) orally 2 times a day (22 Jul 2020 14:40)  gemfibrozil 600 mg oral tablet: 1 tab(s) orally 2 times a day (22 Jul 2020 14:40)  Incruse Ellipta 62.5 mcg/inh inhalation powder: inhaled once a day, As Needed (22 Jul 2020 14:40)  ipratropium 21 mcg/inh (0.03%) nasal spray: 2 spray(s) nasal 3 times a day, As Needed (22 Jul 2020 14:40)  Janumet 50 mg-500 mg oral tablet: 1 tab(s) orally 2 times a day (22 Jul 2020 14:40)  Levemir 100 units/mL subcutaneous solution: 4 unit(s) subcutaneous once a day (at bedtime) (22 Jul 2020 14:40)  lisinopril 2.5 mg oral tablet: 1 tab(s) orally once a day (22 Jul 2020 14:40)  Metoprolol Succinate ER 25 mg oral tablet, extended release: 1 tab(s) orally once a day (22 Jul 2020 14:40)  SEROquel 25 mg oral tablet: 1 tab(s) orally 2 times a day (22 Jul 2020 14:40)  spironolactone 25 mg oral tablet: 1 tab(s) orally 2 times a day (22 Jul 2020 14:40)  Vitamin D3 1000 intl units (25 mcg) oral tablet: 1 tab(s) orally once a day (22 Jul 2020 14:40)  Xarelto 20 mg oral tablet: 1 tab(s) orally once a day (in the evening) (22 Jul 2020 14:40)      REVIEW OF SYSTEMS:  Constitutional: [ ] negative [ ] fevers [ ] chills [ ] weight loss [ ] weight gain  HEENT: [ ] negative [ ] dry eyes [ ] eye irritation [ ] postnasal drip [ ] nasal congestion  CV: [ ] negative  [ ] chest pain [ ] orthopnea [ ] palpitations [ ] murmur  Resp: [ ] negative [ ] cough [ ] shortness of breath [ ] dyspnea [ ] wheezing [ ] sputum [ ] hemoptysis  GI: [ ] negative [ ] nausea [ ] vomiting [ ] diarrhea [ ] constipation [ ] abd pain [ ] dysphagia   : [ ] negative [ ] dysuria [ ] nocturia [ ] hematuria [ ] increased urinary frequency  Musculoskeletal: [ ] negative [ ] back pain [ ] myalgias [ ] arthralgias [ ] fracture  Skin: [ ] negative [ ] rash [ ] itch  Neurological: [ ] negative [ ] headache [ ] dizziness [ ] syncope [ ] weakness [ ] numbness  Psychiatric: [ ] negative [ ] anxiety [ ] depression  Endocrine: [ ] negative [ ] diabetes [ ] thyroid problem  Hematologic/Lymphatic: [ ] negative [ ] anemia [ ] bleeding problem  Allergic/Immunologic: [ ] negative [ ] itchy eyes [ ] nasal discharge [ ] hives [ ] angioedema  [ ] All other systems negative  [x] Unable to assess ROS because patient not cooperative to exam, does not answer any questions    OBJECTIVE:  ICU Vital Signs Last 24 Hrs  T(C): 36.3 (23 Jul 2020 11:15), Max: 36.9 (22 Jul 2020 20:41)  T(F): 97.4 (23 Jul 2020 11:15), Max: 98.5 (22 Jul 2020 20:41)  HR: 80 (23 Jul 2020 11:15) (68 - 83)  BP: 117/69 (23 Jul 2020 11:15) (117/69 - 126/72)  BP(mean): --  ABP: --  ABP(mean): --  RR: 18 (23 Jul 2020 11:15) (18 - 20)  SpO2: 97% (23 Jul 2020 11:15) (90% - 98%)        07-22 @ 07:01  -  07-23 @ 07:00  --------------------------------------------------------  IN: 220 mL / OUT: 300 mL / NET: -80 mL      CAPILLARY BLOOD GLUCOSE      POCT Blood Glucose.: 134 mg/dL (23 Jul 2020 07:47)    PHYSICAL EXAM:  GENERAL: elderly female in bed, eyelids closed, no obvious distress  NECK: Supple, No JVD  CHEST/LUNG: clear to auscultation bilaterally   HEART: Regular rate and rhythm; No murmurs, rubs, or gallops  ABDOMEN: Soft, Nontender, Nondistended; Bowel sounds present  EXTREMITIES:  2+ Peripheral Pulses, No clubbing, cyanosis, or edema  PSYCH: Mood and affect appropriate  NEUROLOGY: A&Ox0, does not respond to any questions   SKIN: excoriations on abdomen    LINES:     HOSPITAL MEDICATIONS:  Standing Meds:  budesonide 160 MICROgram(s)/formoterol 4.5 MICROgram(s) Inhaler 2 Puff(s) Inhalation two times a day  enoxaparin Injectable 40 milliGRAM(s) SubCutaneous daily  QUEtiapine 25 milliGRAM(s) Oral at bedtime      PRN Meds:  albuterol/ipratropium for Nebulization. 3 milliLiter(s) Nebulizer four times a day PRN  bisacodyl Suppository 10 milliGRAM(s) Rectal daily PRN  haloperidol    Injectable 1 milliGRAM(s) IntraMuscular every 6 hours PRN      LABS:                        10.5   7.78  )-----------( 189      ( 23 Jul 2020 09:58 )             37.3     Hgb Trend: 10.5<--, 10.7<--, 10.7<--, 10.3<--, 10.6<--  07-23    146<H>  |  100  |  23  ----------------------------<  159<H>  3.4<L>   |  36<H>  |  0.85    Ca    9.1      23 Jul 2020 09:58  Phos  2.5     07-23  Mg     1.6     07-23      Creatinine Trend: 0.85<--, 0.64<--, 0.75<--, 0.91<--, 1.27<--, 1.23<--      MICROBIOLOGY:       RADIOLOGY:  [x] Reviewed and interpreted by me  < from: CT Chest w/ IV Cont (07.15.20 @ 02:27) >    Small bilateral pleural effusion with atelectasis. Cardiomegaly. Nonspecific interlobular septal thickening and groundglass/patchy opacities in both lungs, which can be seen in pulmonaryedema. Recommend clinical correlation to assess underlying pneumonia.     No bowel obstruction. Abdominal wall hernias as described.    Nonspecific mild bilateral perinephric stranding without hydroureteronephrosis. Diffuse prominent bladder wall with nonspecific perivesical stranding. Recommend clinical correlation to assess urinary tract infection.    Nonspecific stranding in the right lateral abdominal wall soft tissue, which may represent edema and/or cellulitis. Recommend direct visualization.    < end of copied text >

## 2020-07-23 NOTE — PROGRESS NOTE ADULT - PROBLEM SELECTOR PROBLEM 8
Closed nondisplaced fracture of posterior arch of first cervical vertebra, initial encounter Pulmonary embolism

## 2020-07-23 NOTE — PROGRESS NOTE ADULT - PROBLEM SELECTOR PLAN 7
-Passed speech eval on 7/22, cleared for dysphagia one with thickened liquids, and crushed medicines  -Aspiration precautions. Neurosurgery recommendations appreciated. Fracture is likely subacute in nature and does not appear unstable.  -Will d/c hard cervical collar in lieu of soft cervical collar based on recommendations.  -MRI reviewed - no definitive evidence for epidural hematoma  -Outpatient neurosurgery follow-up

## 2020-07-23 NOTE — PROGRESS NOTE ADULT - PROBLEM SELECTOR PLAN 3
-Severe dysfunction of LV noted on TTE.   -Continue to hold diuretics in setting of hypernatremia.   -Monitor Is and Os.  -Holding home metoprolol because it is noncrushable    COPD:  -Continue symbicort BID in lieu of elipta (home medication).   -Duonebs PRN.  -Goal O2 sat 92-94% -Baseline prior to admission is alert, oriented to self only, able to ambulate with assistance, requires assistance with feeding  -Continue management of acute medical issues as described.   -Limit sedatives/narcotics as able. Attempt to maintain sleep-wake cycle.  -Haloperidol PRN for agitation. Monitor QTc.  -Resume seroquel 25 mg PO qhs for intermittent agitation

## 2020-07-23 NOTE — PROGRESS NOTE ADULT - PROBLEM SELECTOR PLAN 1
-Completed CTX 1g IV daily 7 day course. s/p azithromycin for atypical pneumonia.   -Continue O2 via nasal cannula during daytime, weaning as able to goal O2 sat 94%.  -Secretion mobilization PRN -Continue BIPAP qhs    -O2 via NC during daytime, with goal O2 sat 94%.  -Monitor serum bicarb, clinical exam for hypercarbia  -Pulm consult for recs regarding Bipap or Cpap vs. home O2 on discharge

## 2020-07-23 NOTE — PROGRESS NOTE ADULT - PROBLEM SELECTOR PLAN 5
-Seroquel PO QHS  -Haldol PRN. Resolved with free water supplementation via IV.  -will encourage free water/PO intake.  -Continue to hold diuretics for now  -trend BMP

## 2020-07-23 NOTE — PROGRESS NOTE ADULT - PROBLEM SELECTOR PROBLEM 7
Dysphagia, unspecified type Closed nondisplaced fracture of posterior arch of first cervical vertebra, initial encounter

## 2020-07-23 NOTE — PROGRESS NOTE ADULT - PROBLEM SELECTOR PLAN 6
Resolved with free water supplementation via IV.  -will encourage free water/PO intake.  -Continue to hold diuretics for now -Passed speech eval on 7/22, cleared for dysphagia one with thickened liquids, and crushed medicines  -Aspiration precautions.

## 2020-07-23 NOTE — CONSULT NOTE ADULT - ATTENDING COMMENTS
80 Year old female, Farsi speaking, hx of chronic systolic congestive heart failure, obstructive sleep apnea, copd, schizoaffective disorder, pulmonary embolism admitted w/ sepsis, PNA and CHF exacerbation and C1 fracture. Pt now using bipap qhs for significant hypercapnea, presumed acute. Last cxr shows significant b/l lower lobe opacities and Lung US today shows diffuse b lines. Would regularly diurese pt to optimize status. May even not require bipap at night once optimized. Unclear if she uses cpap at night for her NATAILE. COnt a/c for her VTE hx if not a fall risk at home

## 2020-07-23 NOTE — PROGRESS NOTE ADULT - PROBLEM SELECTOR PLAN 8
-Continue C-collar.   -Neurosurgery follow up.   -Cannot rule out epidural hematoma seen on CT. MR study limited by motion artifact  -PT follow-up -hold xarelto pending discussion with patient's son regarding risk vs benefits of AC in patient with fall risk but also with hx of pe and likely immobility  -neurosurgery follow-up

## 2020-07-23 NOTE — CHART NOTE - NSCHARTNOTEFT_GEN_A_CORE
Chart reviewed:  Pt is an 81 yo female with PMH of COPD, DM2, CHF, schizoaffective disorder, who presented with AMS, admitted 7/15. Pt presented in shock with hypercarbic respiratory failure, admitted to MICU for ventilatory and pressure support. Transferred to medical floor ([address] [place] [address]) 7/19. "Imaging notable for CT head demonstrating nondisplaced C1 fx and findings suspicious for epidural hematoma, CT chest with diffuse ground glass opacity suspicious for pneumonia or CHF."    Dysphagia hx: Swallow bedside assessment 7/20 rec for NPO. Palliative care following.   Initially on tube feeds, pt pulled NG tube out on 7/20. Plan for re-evaluation on 7/22. Recommendations for honey thick liquids via tsp and dysphagia 1 solids.     The patient was encountered in bed, eyes closed w/ whispering noted intermittently. Repositioned upright in bed.  utilized; [id number] No family present. C-collar (+). Pt appearing calm and comfortable. No signs of distress. RA (+). Offered pt honey thick liquids via tsp and puree-thick solids via tsp. Orientation/reception was adequate. Aware of stimulus/tsp being presented to labial surface. Provided tactile stimulation (tsp to lips) to alert pt to p.o. throughout trials, which proved effective, as pt with immediate opening of oral cavity. Reduced however effective spoon stripping w/ frequent slurping of bolus from tsp. Adequate manipulation and A-P transport present. Pharyngeal phase c/b suspected mild delay in swallow function. No s/s of aspiration w/ 2 oz of thick puree via tsp and honey thick liquids 3oz consumed.  Pt presents with an mita-pharyngeal dysphagia. Purposeful proactive rounding reinforced and 5 Ps addressed. Pt left in no distress.     D/w nursing and MD Elías YOST    Recommendations   Dysphagia 1 and honey thick liquids via tsp   NO STRAW  NO CUP  Medication to be crushed  1:1 w/ all po intake  Monitor tolerance   Aspiration precautions   Do not feed if not awake/alert   Monitor for oral pocketing     This service will sign off at this time given current POC    Cinthia Ramos MS CCC-SLP pager [phone]

## 2020-07-24 ENCOUNTER — TRANSCRIPTION ENCOUNTER (OUTPATIENT)
Age: 80
End: 2020-07-24

## 2020-07-24 LAB
ANION GAP SERPL CALC-SCNC: 7 MMOL/L — SIGNIFICANT CHANGE UP (ref 5–17)
BUN SERPL-MCNC: 28 MG/DL — HIGH (ref 7–23)
CALCIUM SERPL-MCNC: 8.9 MG/DL — SIGNIFICANT CHANGE UP (ref 8.4–10.5)
CHLORIDE SERPL-SCNC: 101 MMOL/L — SIGNIFICANT CHANGE UP (ref 96–108)
CO2 SERPL-SCNC: 36 MMOL/L — HIGH (ref 22–31)
CREAT SERPL-MCNC: 0.98 MG/DL — SIGNIFICANT CHANGE UP (ref 0.5–1.3)
GLUCOSE BLDC GLUCOMTR-MCNC: 102 MG/DL — HIGH (ref 70–99)
GLUCOSE BLDC GLUCOMTR-MCNC: 126 MG/DL — HIGH (ref 70–99)
GLUCOSE BLDC GLUCOMTR-MCNC: 130 MG/DL — HIGH (ref 70–99)
GLUCOSE BLDC GLUCOMTR-MCNC: 157 MG/DL — HIGH (ref 70–99)
GLUCOSE SERPL-MCNC: 133 MG/DL — HIGH (ref 70–99)
HCT VFR BLD CALC: 34.6 % — SIGNIFICANT CHANGE UP (ref 34.5–45)
HGB BLD-MCNC: 9.7 G/DL — LOW (ref 11.5–15.5)
MAGNESIUM SERPL-MCNC: 1.7 MG/DL — SIGNIFICANT CHANGE UP (ref 1.6–2.6)
MCHC RBC-ENTMCNC: 24.2 PG — LOW (ref 27–34)
MCHC RBC-ENTMCNC: 28 GM/DL — LOW (ref 32–36)
MCV RBC AUTO: 86.3 FL — SIGNIFICANT CHANGE UP (ref 80–100)
NRBC # BLD: 0 /100 WBCS — SIGNIFICANT CHANGE UP (ref 0–0)
PHOSPHATE SERPL-MCNC: 2.1 MG/DL — LOW (ref 2.5–4.5)
PLATELET # BLD AUTO: 183 K/UL — SIGNIFICANT CHANGE UP (ref 150–400)
POTASSIUM SERPL-MCNC: 4.4 MMOL/L — SIGNIFICANT CHANGE UP (ref 3.5–5.3)
POTASSIUM SERPL-SCNC: 4.4 MMOL/L — SIGNIFICANT CHANGE UP (ref 3.5–5.3)
RBC # BLD: 4.01 M/UL — SIGNIFICANT CHANGE UP (ref 3.8–5.2)
RBC # FLD: 16 % — HIGH (ref 10.3–14.5)
SODIUM SERPL-SCNC: 144 MMOL/L — SIGNIFICANT CHANGE UP (ref 135–145)
WBC # BLD: 7.11 K/UL — SIGNIFICANT CHANGE UP (ref 3.8–10.5)
WBC # FLD AUTO: 7.11 K/UL — SIGNIFICANT CHANGE UP (ref 3.8–10.5)

## 2020-07-24 PROCEDURE — 99233 SBSQ HOSP IP/OBS HIGH 50: CPT | Mod: GC

## 2020-07-24 PROCEDURE — 99232 SBSQ HOSP IP/OBS MODERATE 35: CPT | Mod: GC

## 2020-07-24 RX ORDER — LISINOPRIL 2.5 MG/1
2.5 TABLET ORAL DAILY
Refills: 0 | Status: DISCONTINUED | OUTPATIENT
Start: 2020-07-24 | End: 2020-07-28

## 2020-07-24 RX ORDER — RIVAROXABAN 15 MG-20MG
20 KIT ORAL DAILY
Refills: 0 | Status: DISCONTINUED | OUTPATIENT
Start: 2020-07-24 | End: 2020-07-28

## 2020-07-24 RX ORDER — FUROSEMIDE 40 MG
40 TABLET ORAL DAILY
Refills: 0 | Status: DISCONTINUED | OUTPATIENT
Start: 2020-07-24 | End: 2020-07-26

## 2020-07-24 RX ADMIN — Medication 40 MILLIGRAM(S): at 11:29

## 2020-07-24 RX ADMIN — Medication 25 MILLIGRAM(S): at 05:06

## 2020-07-24 RX ADMIN — BUDESONIDE AND FORMOTEROL FUMARATE DIHYDRATE 2 PUFF(S): 160; 4.5 AEROSOL RESPIRATORY (INHALATION) at 17:58

## 2020-07-24 RX ADMIN — ENOXAPARIN SODIUM 40 MILLIGRAM(S): 100 INJECTION SUBCUTANEOUS at 11:30

## 2020-07-24 RX ADMIN — Medication 25 MILLIGRAM(S): at 17:54

## 2020-07-24 RX ADMIN — HALOPERIDOL DECANOATE 1 MILLIGRAM(S): 100 INJECTION INTRAMUSCULAR at 00:59

## 2020-07-24 RX ADMIN — QUETIAPINE FUMARATE 25 MILLIGRAM(S): 200 TABLET, FILM COATED ORAL at 21:10

## 2020-07-24 RX ADMIN — RIVAROXABAN 20 MILLIGRAM(S): KIT at 21:18

## 2020-07-24 RX ADMIN — BUDESONIDE AND FORMOTEROL FUMARATE DIHYDRATE 2 PUFF(S): 160; 4.5 AEROSOL RESPIRATORY (INHALATION) at 05:54

## 2020-07-24 NOTE — PROGRESS NOTE ADULT - SUBJECTIVE AND OBJECTIVE BOX
Interval Events: Patient was seen and examined at bedside. Patient ripped out her bipap overnight, and received haldol for agitation. Patient remained was 98% on 2L.    [employer] , [place], [place] [id number], translating for this conversation.  Patient today is not cooperative with the exam and will not answer any questions. She said "yes" when asked what her name is.     REVIEW OF SYSTEMS:  Constitutional: [ ] fevers [ ] chills [ ] weight loss [ ] weight gain  CV: [ ] chest pain [ ] orthopnea [ ] palpitations [ ] murmur  Resp: [ ] cough [ ] shortness of breath [ ] dyspnea [ ] wheezing [ ] sputum [ ] hemoptysis  [ ] All other systems negative  [x] Unable to assess ROS because patient is altered.     OBJECTIVE:  ICU Vital Signs Last 24 Hrs  T(C): 37 (24 Jul 2020 04:22), Max: 37 (24 Jul 2020 04:22)  T(F): 98.6 (24 Jul 2020 04:22), Max: 98.6 (24 Jul 2020 04:22)  HR: 64 (24 Jul 2020 05:56) (64 - 98)  BP: 115/63 (24 Jul 2020 04:22) (115/63 - 148/67)  BP(mean): --  ABP: --  ABP(mean): --  RR: 18 (24 Jul 2020 04:22) (18 - 20)  SpO2: 99% (24 Jul 2020 05:56) (93% - 99%)        07-23 @ 07:01  -  07-24 @ 07:00  --------------------------------------------------------  IN: 0 mL / OUT: 300 mL / NET: -300 mL      CAPILLARY BLOOD GLUCOSE      POCT Blood Glucose.: 102 mg/dL (24 Jul 2020 08:42)    PHYSICAL EXAM:  GENERAL: elderly female in bed, eyelids closed, no obvious distress  NECK: Supple, No JVD  CHEST/LUNG: clear to auscultation bilaterally   HEART: Regular rate and rhythm; No murmurs, rubs, or gallops  ABDOMEN: Soft, Nontender, Nondistended; Bowel sounds present  EXTREMITIES:  2+ Peripheral Pulses, No clubbing, cyanosis, or edema  PSYCH: Mood and affect appropriate  NEUROLOGY: A&Ox0, does not respond to any questions   SKIN: excoriations on abdomen    HOSPITAL MEDICATIONS:  MEDICATIONS  (STANDING):  budesonide 160 MICROgram(s)/formoterol 4.5 MICROgram(s) Inhaler 2 Puff(s) Inhalation two times a day  enoxaparin Injectable 40 milliGRAM(s) SubCutaneous daily  furosemide    Tablet 40 milliGRAM(s) Oral daily  lisinopril 2.5 milliGRAM(s) Oral daily  metoprolol tartrate 25 milliGRAM(s) Oral two times a day  QUEtiapine 25 milliGRAM(s) Oral at bedtime    MEDICATIONS  (PRN):  albuterol/ipratropium for Nebulization. 3 milliLiter(s) Nebulizer four times a day PRN Shortness of Breath and/or Wheezing  bisacodyl Suppository 10 milliGRAM(s) Rectal daily PRN Constipation  haloperidol    Injectable 1 milliGRAM(s) IntraMuscular every 6 hours PRN agitation      LABS:                        9.7    7.11  )-----------( 183      ( 24 Jul 2020 06:59 )             34.6     Hgb Trend: 9.7<--, 10.5<--, 10.7<--, 10.7<--, 10.3<--  07-24    144  |  101  |  28<H>  ----------------------------<  133<H>  4.4   |  36<H>  |  0.98    Ca    8.9      24 Jul 2020 06:59  Phos  2.1     07-24  Mg     1.7     07-24      Creatinine Trend: 0.98<--, 0.85<--, 0.64<--, 0.75<--, 0.91<--, 1.27<--      Arterial Blood Gas:  07-23 @ 17:01  7.39/66/92/39/97/12.1  ABG lactate: --      MICROBIOLOGY:     RADIOLOGY:  [ ] Reviewed and interpreted by me

## 2020-07-24 NOTE — PROGRESS NOTE ADULT - PROBLEM SELECTOR PLAN 6
-Passed speech eval on 7/22, cleared for dysphagia one with thickened liquids, and crushed medicines  -Aspiration precautions.

## 2020-07-24 NOTE — PROGRESS NOTE ADULT - PROBLEM SELECTOR PLAN 9
-DNR/DNI in chart  -Plan is for patient to return home with care from family members, cm is following  -Palliative care f/u appreciated

## 2020-07-24 NOTE — PROGRESS NOTE ADULT - ASSESSMENT
80 Year old female, Farsi speaking, hx of chronic systolic congestive heart failure, obstructive sleep apnea, copd, schizoaffective disorder, pulmonary embolism presenting with lethargy and confusion 2/2 to shock and hypercarbic respiratory failure, now on BIPAP overnight with nasal canula during the day.     #Acute on Chronic Respiratory failure   -Hx of COPD and sleep apnea, however, initial bicarb wnl, suggesting that patient is not a chronic retainer. elevated bicarb now represents patient compensating for her hypercarbia.   -POCUS on 7/23 shows B-lines anterior up to upper lung bilaterally, likely fluid overloaded  -Hypoxia likely multifactorial, 2/2 to fluid overload and existing lung disease  -Patient remained 98% on 2L overnight, and 98% on Room air overnight. Patient also did not tolerate BiPap overnight.  -c/w diuresis to optimize volume status, net goal -1L/night  -Please ask family if they have CPAP machine at home.     Sherman Mcclellan, PGY-2  Internal Medicine  Pulmonary Consult Resident  Pager [phone]    ***Please wait until final attending attestation*** 80 Year old female, Farsi speaking, hx of chronic systolic congestive heart failure, obstructive sleep apnea, copd, schizoaffective disorder, pulmonary embolism presenting with lethargy and confusion 2/2 to shock and hypercarbic respiratory failure, now on BIPAP overnight with nasal canula during the day.     #Acute on Chronic Respiratory failure   -Hx of COPD and sleep apnea, however, initial bicarb wnl, suggesting that patient is not a chronic retainer. elevated bicarb now represents patient compensating for her hypercarbia.   -POCUS on 7/23 shows B-lines anterior up to upper lung bilaterally, likely fluid overloaded  -Hypoxia likely multifactorial, 2/2 to fluid overload and existing lung disease  -Patient remained 98% on 2L overnight, and 98% on Room air overnight. Patient also did not tolerate BiPap overnight.  -c/w diuresis to optimize volume status  -Please ask family if they have CPAP machine at home.     Sherman Mcclellan, PGY-2  Internal Medicine  Pulmonary Consult Resident  Pager [phone]    ***Please wait until final attending attestation*** 80 Year old female, Farsi speaking, hx of chronic systolic congestive heart failure, obstructive sleep apnea, copd, schizoaffective disorder, pulmonary embolism presenting with lethargy and confusion 2/2 to shock and hypercarbic respiratory failure, now on BIPAP overnight with nasal canula during the day.     #Acute on Chronic Respiratory failure   -Hx of COPD and sleep apnea, however, initial bicarb wnl, suggesting that patient is not a chronic retainer. elevated bicarb now represents patient compensating for her hypercarbia.   -POCUS on 7/23 shows B-lines anterior up to upper lung bilaterally, likely fluid overloaded  -Hypoxia likely multifactorial, 2/2 to fluid overload and existing lung disease  -Patient remained 98% on 2L overnight, and 98% on Room air overnight. Patient also did not tolerate BiPap overnight.  -c/w diuresis with lasix to optimize volume status. If concerned about hypernatremia, can do metolazone for natriuresis effect   -Please ask family if they have CPAP machine at home.     Sherman Mcclellan, PGY-2  Internal Medicine  Pulmonary Consult Resident  Pager [phone]    ***Please wait until final attending attestation***

## 2020-07-24 NOTE — DISCHARGE NOTE NURSING/CASE MANAGEMENT/SOCIAL WORK - PATIENT PORTAL LINK FT
You can access the FollowMyHealth Patient Portal offered by [facility] by registering at the following website: [email or web address]. By joining [facility]’s FollowMyHealth portal, you will also be able to view your health information using other applications (apps) compatible with our system.

## 2020-07-24 NOTE — PROGRESS NOTE ADULT - PROBLEM SELECTOR PLAN 7
Neurosurgery recommendations appreciated. Fracture is likely subacute in nature and does not appear unstable.  -Will d/c hard cervical collar in lieu of soft cervical collar based on recommendations.  -MRI reviewed - no definitive evidence for epidural hematoma  -Outpatient neurosurgery follow-up

## 2020-07-24 NOTE — PROGRESS NOTE ADULT - PROBLEM SELECTOR PLAN 1
-O2 via NC with goal O2 sat 94%.  -Monitor serum bicarb, clinical exam for hypercarbia  -Pulm consult recommending lasix for volume overload, and follow up ABG on sunday to reassess pCO2 and pO2 -O2 via NC with goal O2 sat 94%.  -Monitor serum bicarb, clinical exam for hypercarbia  -Pulm consult recommending lasix for volume overload, and follow up ABG on Sunday to reassess pCO2 and pO2  -For now, will continue O2 support during daytime and nighttime. Trial off BIPAP last night with stable O2 saturations. Re-evaluate hypercarbia after diuresis.

## 2020-07-24 NOTE — PROGRESS NOTE ADULT - SUBJECTIVE AND OBJECTIVE BOX
Elías Martinez MD PGY1  Pager [phone]    Patient is a 80y old  Female who presents with a chief complaint of AMS (24 Jul 2020 09:12)      SUBJECTIVE/OVERNIGHT EVENTS: Overnight the patient pulled off her c collar and bipap. She was on nc at 4 liters for the remainder of the night without desaturation. The patient is arousable to voice. Answers all questions with yes.    The patient is not able to answer ROS questions    MEDICATIONS  (STANDING):  budesonide 160 MICROgram(s)/formoterol 4.5 MICROgram(s) Inhaler 2 Puff(s) Inhalation two times a day  enoxaparin Injectable 40 milliGRAM(s) SubCutaneous daily  furosemide    Tablet 40 milliGRAM(s) Oral daily  lisinopril 2.5 milliGRAM(s) Oral daily  metoprolol tartrate 25 milliGRAM(s) Oral two times a day  QUEtiapine 25 milliGRAM(s) Oral at bedtime    MEDICATIONS  (PRN):  albuterol/ipratropium for Nebulization. 3 milliLiter(s) Nebulizer four times a day PRN Shortness of Breath and/or Wheezing  bisacodyl Suppository 10 milliGRAM(s) Rectal daily PRN Constipation  haloperidol    Injectable 1 milliGRAM(s) IntraMuscular every 6 hours PRN agitation    CAPILLARY BLOOD GLUCOSE      POCT Blood Glucose.: 130 mg/dL (24 Jul 2020 13:08)  POCT Blood Glucose.: 102 mg/dL (24 Jul 2020 08:42)  POCT Blood Glucose.: 126 mg/dL (24 Jul 2020 06:09)  POCT Blood Glucose.: 179 mg/dL (23 Jul 2020 23:53)    I&O's Summary    23 Jul 2020 07:01  -  24 Jul 2020 07:00  --------------------------------------------------------  IN: 0 mL / OUT: 300 mL / NET: -300 mL    24 Jul 2020 07:01  -  24 Jul 2020 16:03  --------------------------------------------------------  IN: 580 mL / OUT: 600 mL / NET: -20 mL          Vital Signs Last 24 Hrs  T(C): 36.9 (24 Jul 2020 15:15), Max: 37.2 (24 Jul 2020 10:53)  T(F): 98.4 (24 Jul 2020 15:15), Max: 98.9 (24 Jul 2020 10:53)  HR: 72 (24 Jul 2020 15:15) (64 - 98)  BP: 91/55 (24 Jul 2020 15:15) (91/55 - 148/67)  BP(mean): --  RR: 16 (24 Jul 2020 15:15) (16 - 20)  SpO2: 94% (24 Jul 2020 15:15) (93% - 99%)    PHYSICAL EXAM:  GENERAL: elderly female asleep in bed, appears in no distress  HEENT: enucleated orbits  NECK:  No JVD  CHEST/LUNG: crackles bl  HEART: Regular rate and rhythm; No murmurs, rubs, or gallops  ABDOMEN: Soft, Nontender, Nondistended; Bowel sounds present  EXTREMITIES:  2+ Peripheral Pulses, non pitting lower extremity edema  PSYCH: calm and cooperative  NEUROLOGY: A+Ox1, moves all extremities spontaneously  SKIN: excoriation on abdomen    LABS                        9.7    7.11  )-----------( 183      ( 24 Jul 2020 06:59 )             34.6                         10.5   7.78  )-----------( 189      ( 23 Jul 2020 09:58 )             37.3     07-24    144  |  101  |  28<H>  ----------------------------<  133<H>  4.4   |  36<H>  |  0.98  07-23    146<H>  |  100  |  23  ----------------------------<  159<H>  3.4<L>   |  36<H>  |  0.85    Ca    8.9      24 Jul 2020 06:59  Ca    9.1      23 Jul 2020 09:58  Phos  2.1     07-24  Mg     1.7     07-24         15 Jul 2020 07:04 Troponin x     / CK 27 U/L / CKMB x     / CKMB Units 2.3 ng/mL          ( 23 Jul 2020 17:01 ) pH: 7.39  /  pCO2: 66    /  pO2: 92    / HCO3: 39    / Base Excess: 12.1  /  SaO2: 97              ( 20 Jul 2020 06:17 ) pH: 7.40  /  pCO2: 67    /  pO2: 47    / HCO3: 40    / Base Excess: 13.3  /  SaO2: 84              ( 19 Jul 2020 13:10 ) pH: 7.33  /  pCO2: 76    /  pO2: 99    / HCO3: 39    / Base Excess: 10.8  /  SaO2: 97                    RADIOLOGY & ADDITIONAL TESTS: Elías Martinez MD PGY1  Pager [phone]    Patient is a 80y old  Female who presents with a chief complaint of AMS (24 Jul 2020 09:12)      SUBJECTIVE/OVERNIGHT EVENTS: Overnight the patient pulled off her c collar and bipap. She was on nc at 4 liters for the remainder of the night without desaturation. The patient is arousable to voice. Answers all questions with yes.    The patient is not able to answer ROS questions    MEDICATIONS  (STANDING):  budesonide 160 MICROgram(s)/formoterol 4.5 MICROgram(s) Inhaler 2 Puff(s) Inhalation two times a day  enoxaparin Injectable 40 milliGRAM(s) SubCutaneous daily  furosemide    Tablet 40 milliGRAM(s) Oral daily  lisinopril 2.5 milliGRAM(s) Oral daily  metoprolol tartrate 25 milliGRAM(s) Oral two times a day  QUEtiapine 25 milliGRAM(s) Oral at bedtime    MEDICATIONS  (PRN):  albuterol/ipratropium for Nebulization. 3 milliLiter(s) Nebulizer four times a day PRN Shortness of Breath and/or Wheezing  bisacodyl Suppository 10 milliGRAM(s) Rectal daily PRN Constipation  haloperidol    Injectable 1 milliGRAM(s) IntraMuscular every 6 hours PRN agitation    CAPILLARY BLOOD GLUCOSE      POCT Blood Glucose.: 130 mg/dL (24 Jul 2020 13:08)  POCT Blood Glucose.: 102 mg/dL (24 Jul 2020 08:42)  POCT Blood Glucose.: 126 mg/dL (24 Jul 2020 06:09)  POCT Blood Glucose.: 179 mg/dL (23 Jul 2020 23:53)    I&O's Summary    23 Jul 2020 07:01  -  24 Jul 2020 07:00  --------------------------------------------------------  IN: 0 mL / OUT: 300 mL / NET: -300 mL    24 Jul 2020 07:01  -  24 Jul 2020 16:03  --------------------------------------------------------  IN: 580 mL / OUT: 600 mL / NET: -20 mL          Vital Signs Last 24 Hrs  T(C): 36.9 (24 Jul 2020 15:15), Max: 37.2 (24 Jul 2020 10:53)  T(F): 98.4 (24 Jul 2020 15:15), Max: 98.9 (24 Jul 2020 10:53)  HR: 72 (24 Jul 2020 15:15) (64 - 98)  BP: 91/55 (24 Jul 2020 15:15) (91/55 - 148/67)  BP(mean): --  RR: 16 (24 Jul 2020 15:15) (16 - 20)  SpO2: 94% (24 Jul 2020 15:15) (93% - 99%)    PHYSICAL EXAM:  GENERAL: elderly female asleep in bed, appears in no distress  HEENT: enucleated orbits  NECK:  No JVD  CHEST/LUNG: crackles bl  HEART: Regular rate and rhythm; No murmurs, rubs, or gallops  ABDOMEN: Soft, Nontender, Nondistended; Bowel sounds present  EXTREMITIES:  2+ Peripheral Pulses, non pitting lower extremity edema  PSYCH: calm and cooperative  NEUROLOGY: A+Ox1, moves all extremities spontaneously  SKIN: excoriation on abdomen    LABS                        9.7    7.11  )-----------( 183      ( 24 Jul 2020 06:59 )             34.6                         10.5   7.78  )-----------( 189      ( 23 Jul 2020 09:58 )             37.3     07-24    144  |  101  |  28<H>  ----------------------------<  133<H>  4.4   |  36<H>  |  0.98  07-23    146<H>  |  100  |  23  ----------------------------<  159<H>  3.4<L>   |  36<H>  |  0.85    Ca    8.9      24 Jul 2020 06:59  Ca    9.1      23 Jul 2020 09:58  Phos  2.1     07-24  Mg     1.7     07-24         15 Jul 2020 07:04 Troponin x     / CK 27 U/L / CKMB x     / CKMB Units 2.3 ng/mL          ( 23 Jul 2020 17:01 ) pH: 7.39  /  pCO2: 66    /  pO2: 92    / HCO3: 39    / Base Excess: 12.1  /  SaO2: 97              ( 20 Jul 2020 06:17 ) pH: 7.40  /  pCO2: 67    /  pO2: 47    / HCO3: 40    / Base Excess: 13.3  /  SaO2: 84              ( 19 Jul 2020 13:10 ) pH: 7.33  /  pCO2: 76    /  pO2: 99    / HCO3: 39    / Base Excess: 10.8  /  SaO2: 97        RADIOLOGY & ADDITIONAL TESTS: No new studies

## 2020-07-24 NOTE — PROGRESS NOTE ADULT - ATTENDING COMMENTS
Patient seen and examined this morning on [address] [place].    Clinically, she appears well. She is awake, attempting to answer questions, and does not seem agitated. Overnight, she reportedly self-removed her hard c-collar and removed the BIPAP as well. Her O2 sats were stable on nasal cannula regardless.    Pulmonary follow-up appreciated. From an oxygenation standpoint, it does not appear that she requires non-invasive ventilation at this time. O2 via nasal cannula seems more appropriate. In terms of her hypercarbia, likely driven by her chronic COPD, but pulmonary edema is evident on ultrasound, suggesting CHF as the acute . Plan is to diurese as best as possible over the next few days, and re-evaluate need for non-invasive ventilation.    That being said, it is unclear that she will  be able to tolerate BIPAP or CPAP at home. She has continually removed the device at night. From a clinical perspective, she has been improving despite this.    If no plan to obtain BIPAP or CPAP at home, patient can be discharged on O2 (family has in their possession) and resumption of her oral diuretic therapy.    Will resume xarelto today as well.

## 2020-07-24 NOTE — PROGRESS NOTE ADULT - PROBLEM SELECTOR PLAN 3
-Baseline prior to admission is alert, oriented to self only, able to ambulate with assistance, requires assistance with feeding  -Continue management of acute medical issues as described.   -Limit sedatives/narcotics as able. Attempt to maintain sleep-wake cycle.  -Haloperidol PRN for agitation. Monitor QTc.  -Resume seroquel 25 mg PO qhs for intermittent agitation -Baseline prior to admission is alert, oriented to self only, able to ambulate with assistance, requires assistance with feeding  -Continue management of acute medical issues as described.   -Limit sedatives/narcotics as able. Attempt to maintain sleep-wake cycle.  -Haloperidol PRN for agitation. Monitor QTc.  -Continue seroquel 25 mg PO qhs for intermittent agitation

## 2020-07-24 NOTE — PROGRESS NOTE ADULT - PROBLEM SELECTOR PLAN 8
-hold xarelto pending discussion with patient's son regarding risk vs benefits of AC in patient with fall risk but also with hx of pe and likely immobility  -neurosurgery follow-up -will resume systemic anticoagulation at this time. Although patient remains a fall risk, patient has a history of recurrent thrombosis and limited mobility. No definitive signs of epidural hemorrhage on MRI. Risks/benefits discussed with son today.

## 2020-07-24 NOTE — PROGRESS NOTE ADULT - PROBLEM SELECTOR PLAN 2
-Severe dysfunction of LV noted on TTE.   -Resume diuretics    -Monitor Is and Os.  -metoprolol tartrate     COPD:  -Continue symbicort BID in lieu of elipta (home medication).   -Duonebs PRN.  -Goal O2 sat 92-94% -Severe dysfunction of LV noted on TTE.   -Resume diuretics    -Monitor Is and Os.  -Continue metoprolol    COPD:  -Continue symbicort BID in lieu of elipta (home medication).   -Duonebs PRN.  -Goal O2 sat 92-94%

## 2020-07-24 NOTE — PROGRESS NOTE ADULT - ATTENDING COMMENTS
Patient seen and examined, minimally conversant.  History as noted above.  she has both CHF and COPD and developed respiratory failure with hypercapnea in the setting of a CHF exacerbation.  Would continue heart failure management.  As she is hypercapneic will see if we can qualify her for noninvasive ventilation.  Can use meanwhile in the hospital in order to optimize treatment.  Agree with plan as outlined above.

## 2020-07-25 LAB
ANION GAP SERPL CALC-SCNC: 8 MMOL/L — SIGNIFICANT CHANGE UP (ref 5–17)
BUN SERPL-MCNC: 31 MG/DL — HIGH (ref 7–23)
CALCIUM SERPL-MCNC: 8.7 MG/DL — SIGNIFICANT CHANGE UP (ref 8.4–10.5)
CHLORIDE SERPL-SCNC: 100 MMOL/L — SIGNIFICANT CHANGE UP (ref 96–108)
CO2 SERPL-SCNC: 36 MMOL/L — HIGH (ref 22–31)
CREAT SERPL-MCNC: 0.96 MG/DL — SIGNIFICANT CHANGE UP (ref 0.5–1.3)
GLUCOSE BLDC GLUCOMTR-MCNC: 103 MG/DL — HIGH (ref 70–99)
GLUCOSE BLDC GLUCOMTR-MCNC: 123 MG/DL — HIGH (ref 70–99)
GLUCOSE BLDC GLUCOMTR-MCNC: 140 MG/DL — HIGH (ref 70–99)
GLUCOSE SERPL-MCNC: 118 MG/DL — HIGH (ref 70–99)
HCT VFR BLD CALC: 33 % — LOW (ref 34.5–45)
HGB BLD-MCNC: 9.4 G/DL — LOW (ref 11.5–15.5)
MAGNESIUM SERPL-MCNC: 1.4 MG/DL — LOW (ref 1.6–2.6)
MCHC RBC-ENTMCNC: 24.5 PG — LOW (ref 27–34)
MCHC RBC-ENTMCNC: 28.5 GM/DL — LOW (ref 32–36)
MCV RBC AUTO: 85.9 FL — SIGNIFICANT CHANGE UP (ref 80–100)
NRBC # BLD: 0 /100 WBCS — SIGNIFICANT CHANGE UP (ref 0–0)
PHOSPHATE SERPL-MCNC: 2.3 MG/DL — LOW (ref 2.5–4.5)
PLATELET # BLD AUTO: 198 K/UL — SIGNIFICANT CHANGE UP (ref 150–400)
POTASSIUM SERPL-MCNC: 3.5 MMOL/L — SIGNIFICANT CHANGE UP (ref 3.5–5.3)
POTASSIUM SERPL-SCNC: 3.5 MMOL/L — SIGNIFICANT CHANGE UP (ref 3.5–5.3)
RBC # BLD: 3.84 M/UL — SIGNIFICANT CHANGE UP (ref 3.8–5.2)
RBC # FLD: 16.2 % — HIGH (ref 10.3–14.5)
SODIUM SERPL-SCNC: 144 MMOL/L — SIGNIFICANT CHANGE UP (ref 135–145)
TSH SERPL-MCNC: 2.24 UIU/ML — SIGNIFICANT CHANGE UP (ref 0.27–4.2)
WBC # BLD: 7.41 K/UL — SIGNIFICANT CHANGE UP (ref 3.8–10.5)
WBC # FLD AUTO: 7.41 K/UL — SIGNIFICANT CHANGE UP (ref 3.8–10.5)

## 2020-07-25 PROCEDURE — 99232 SBSQ HOSP IP/OBS MODERATE 35: CPT | Mod: GC

## 2020-07-25 RX ORDER — MAGNESIUM OXIDE 400 MG ORAL TABLET 241.3 MG
400 TABLET ORAL EVERY 8 HOURS
Refills: 0 | Status: COMPLETED | OUTPATIENT
Start: 2020-07-25 | End: 2020-07-26

## 2020-07-25 RX ORDER — SODIUM,POTASSIUM PHOSPHATES 278-250MG
1 POWDER IN PACKET (EA) ORAL ONCE
Refills: 0 | Status: COMPLETED | OUTPATIENT
Start: 2020-07-25 | End: 2020-07-25

## 2020-07-25 RX ADMIN — Medication 1 PACKET(S): at 13:20

## 2020-07-25 RX ADMIN — LISINOPRIL 2.5 MILLIGRAM(S): 2.5 TABLET ORAL at 05:19

## 2020-07-25 RX ADMIN — BUDESONIDE AND FORMOTEROL FUMARATE DIHYDRATE 2 PUFF(S): 160; 4.5 AEROSOL RESPIRATORY (INHALATION) at 05:19

## 2020-07-25 RX ADMIN — Medication 25 MILLIGRAM(S): at 05:19

## 2020-07-25 RX ADMIN — QUETIAPINE FUMARATE 25 MILLIGRAM(S): 200 TABLET, FILM COATED ORAL at 21:42

## 2020-07-25 RX ADMIN — RIVAROXABAN 20 MILLIGRAM(S): KIT at 13:20

## 2020-07-25 RX ADMIN — MAGNESIUM OXIDE 400 MG ORAL TABLET 400 MILLIGRAM(S): 241.3 TABLET ORAL at 13:20

## 2020-07-25 RX ADMIN — MAGNESIUM OXIDE 400 MG ORAL TABLET 400 MILLIGRAM(S): 241.3 TABLET ORAL at 21:42

## 2020-07-25 RX ADMIN — BUDESONIDE AND FORMOTEROL FUMARATE DIHYDRATE 2 PUFF(S): 160; 4.5 AEROSOL RESPIRATORY (INHALATION) at 17:18

## 2020-07-25 RX ADMIN — Medication 40 MILLIGRAM(S): at 05:19

## 2020-07-25 RX ADMIN — Medication 25 MILLIGRAM(S): at 18:01

## 2020-07-25 NOTE — PROGRESS NOTE ADULT - PROBLEM SELECTOR PLAN 9
-DNR/DNI in chart  -Plan is for patient to return home with care from family members pending volume optimization, CM is following.  -Palliative care f/u appreciated

## 2020-07-25 NOTE — PROGRESS NOTE ADULT - PROBLEM SELECTOR PLAN 3
-Baseline prior to admission is alert, oriented to self only, able to ambulate with assistance, requires assistance with feeding  -Continue management of acute medical issues as described.   -Limit sedatives/narcotics as able. Attempt to maintain sleep-wake cycle.  -Haloperidol PRN for agitation. Monitor QTc.  -Continue seroquel 25 mg PO qhs for intermittent agitation

## 2020-07-25 NOTE — PROGRESS NOTE ADULT - ATTENDING COMMENTS
Dr. Livia De Jesus  Division of Hospital Medicine  [facility]   Pager: [phone]    Patient seen and examined today with Team 7 Resident. Agree with above findings, assessment, and plan with the following additions/exceptions:    Trista  #[id number]. Per , patient not making sense nor answering questions asked.  Continue with Lasix 40mg PO daily with plan for repeat ABG tomorrow to assess for hypercapnia off CPAP.    Rest as detailed in note above.

## 2020-07-25 NOTE — PROGRESS NOTE ADULT - PROBLEM SELECTOR PLAN 2
-Severe dysfunction of LV noted on TTE.   -Resume diuretics    -Monitor Is and Os.  -Continue metoprolol    COPD:  -Continue symbicort BID in lieu of elipta (home medication).   -Duonebs PRN.  -Goal O2 sat 92-94%

## 2020-07-25 NOTE — PROGRESS NOTE ADULT - PROBLEM SELECTOR PLAN 8
-will resume systemic anticoagulation at this time. Although patient remains a fall risk, patient has a history of recurrent thrombosis and limited mobility. No definitive signs of epidural hemorrhage on MRI. Risks/benefits discussed with son today. -will resume systemic anticoagulation at this time. Although patient remains a fall risk, patient has a history of recurrent thrombosis and limited mobility. No definitive signs of epidural hemorrhage on MRI. Risks/benefits discussed with son.

## 2020-07-25 NOTE — PROGRESS NOTE ADULT - SUBJECTIVE AND OBJECTIVE BOX
PROGRESS NOTE:     Patient is a 80y old  Female who presents with a chief complaint of AMS (24 Jul 2020 16:02)      SUBJECTIVE / OVERNIGHT EVENTS: Patient seen and examined at bedside. Vital signs stable overnight.  ADDITIONAL REVIEW OF SYSTEMS:    MEDICATIONS  (STANDING):  budesonide 160 MICROgram(s)/formoterol 4.5 MICROgram(s) Inhaler 2 Puff(s) Inhalation two times a day  furosemide    Tablet 40 milliGRAM(s) Oral daily  lisinopril 2.5 milliGRAM(s) Oral daily  metoprolol tartrate 25 milliGRAM(s) Oral two times a day  QUEtiapine 25 milliGRAM(s) Oral at bedtime  rivaroxaban 20 milliGRAM(s) Oral daily    MEDICATIONS  (PRN):  albuterol/ipratropium for Nebulization. 3 milliLiter(s) Nebulizer four times a day PRN Shortness of Breath and/or Wheezing  bisacodyl Suppository 10 milliGRAM(s) Rectal daily PRN Constipation  haloperidol    Injectable 1 milliGRAM(s) IntraMuscular every 6 hours PRN agitation      CAPILLARY BLOOD GLUCOSE      POCT Blood Glucose.: 123 mg/dL (25 Jul 2020 05:55)  POCT Blood Glucose.: 140 mg/dL (25 Jul 2020 00:32)  POCT Blood Glucose.: 157 mg/dL (24 Jul 2020 17:37)  POCT Blood Glucose.: 130 mg/dL (24 Jul 2020 13:08)  POCT Blood Glucose.: 102 mg/dL (24 Jul 2020 08:42)    I&O's Summary    24 Jul 2020 07:01  -  25 Jul 2020 07:00  --------------------------------------------------------  IN: 580 mL / OUT: 600 mL / NET: -20 mL        PHYSICAL EXAM:  Vital Signs Last 24 Hrs  T(C): 36.7 (25 Jul 2020 04:05), Max: 37.2 (24 Jul 2020 10:53)  T(F): 98 (25 Jul 2020 04:05), Max: 98.9 (24 Jul 2020 10:53)  HR: 70 (25 Jul 2020 04:05) (66 - 88)  BP: 148/74 (25 Jul 2020 04:05) (91/55 - 148/74)  BP(mean): --  RR: 18 (25 Jul 2020 04:05) (16 - 19)  SpO2: 93% (25 Jul 2020 04:05) (93% - 98%)    CONSTITUTIONAL: NAD, well-developed  RESPIRATORY: Normal respiratory effort; lungs are clear to auscultation bilaterally  CARDIOVASCULAR: Regular rate and rhythm, normal S1 and S2, no murmur/rub/gallop; No lower extremity edema; Peripheral pulses are 2+ bilaterally  ABDOMEN: Nontender to palpation, normoactive bowel sounds, no rebound/guarding; No hepatosplenomegaly  MUSCLOSKELETAL: no clubbing or cyanosis of digits; no joint swelling or tenderness to palpation  PSYCH: A+O to person, place, and time; affect appropriate    LABS:                        9.4    7.41  )-----------( 198      ( 25 Jul 2020 06:53 )             33.0     07-25    144  |  100  |  31<H>  ----------------------------<  118<H>  3.5   |  36<H>  |  0.96    Ca    8.7      25 Jul 2020 06:51  Phos  2.3     07-25  Mg     1.4     07-25                  RADIOLOGY & ADDITIONAL TESTS:  Results Reviewed:   Imaging Personally Reviewed:  Electrocardiogram Personally Reviewed:    COORDINATION OF CARE:  Care Discussed with Consultants/Other Providers [Y/N]:  Prior or Outpatient Records Reviewed [Y/N]: PROGRESS NOTE:     Patient is a 80y old  Female who presents with a chief complaint of AMS (24 Jul 2020 16:02)      SUBJECTIVE / OVERNIGHT EVENTS: Patient seen and examined at bedside. Vital signs stable overnight. Unable to obtain ROS.    ADDITIONAL REVIEW OF SYSTEMS:    MEDICATIONS  (STANDING):  budesonide 160 MICROgram(s)/formoterol 4.5 MICROgram(s) Inhaler 2 Puff(s) Inhalation two times a day  furosemide    Tablet 40 milliGRAM(s) Oral daily  lisinopril 2.5 milliGRAM(s) Oral daily  metoprolol tartrate 25 milliGRAM(s) Oral two times a day  QUEtiapine 25 milliGRAM(s) Oral at bedtime  rivaroxaban 20 milliGRAM(s) Oral daily    MEDICATIONS  (PRN):  albuterol/ipratropium for Nebulization. 3 milliLiter(s) Nebulizer four times a day PRN Shortness of Breath and/or Wheezing  bisacodyl Suppository 10 milliGRAM(s) Rectal daily PRN Constipation  haloperidol    Injectable 1 milliGRAM(s) IntraMuscular every 6 hours PRN agitation      CAPILLARY BLOOD GLUCOSE      POCT Blood Glucose.: 123 mg/dL (25 Jul 2020 05:55)  POCT Blood Glucose.: 140 mg/dL (25 Jul 2020 00:32)  POCT Blood Glucose.: 157 mg/dL (24 Jul 2020 17:37)  POCT Blood Glucose.: 130 mg/dL (24 Jul 2020 13:08)  POCT Blood Glucose.: 102 mg/dL (24 Jul 2020 08:42)    I&O's Summary    24 Jul 2020 07:01  -  25 Jul 2020 07:00  --------------------------------------------------------  IN: 580 mL / OUT: 600 mL / NET: -20 mL        PHYSICAL EXAM:  Vital Signs Last 24 Hrs  T(C): 36.7 (25 Jul 2020 04:05), Max: 37.2 (24 Jul 2020 10:53)  T(F): 98 (25 Jul 2020 04:05), Max: 98.9 (24 Jul 2020 10:53)  HR: 70 (25 Jul 2020 04:05) (66 - 88)  BP: 148/74 (25 Jul 2020 04:05) (91/55 - 148/74)  BP(mean): --  RR: 18 (25 Jul 2020 04:05) (16 - 19)  SpO2: 93% (25 Jul 2020 04:05) (93% - 98%)    GENERAL: elderly female asleep in bed, appears in no distress  HEENT: enucleated orbits  NECK:  No JVD  CHEST/LUNG: crackles b/l  HEART: Regular rate and rhythm; No murmurs, rubs, or gallops  ABDOMEN: Soft, Nontender, Nondistended; Bowel sounds present  EXTREMITIES:  2+ Peripheral Pulses, non pitting lower extremity edema  PSYCH: calm and cooperative  NEUROLOGY: A+Ox1, moves all extremities spontaneously  SKIN: excoriation on abdomen  LABS:                        9.4    7.41  )-----------( 198      ( 25 Jul 2020 06:53 )             33.0     07-25    144  |  100  |  31<H>  ----------------------------<  118<H>  3.5   |  36<H>  |  0.96    Ca    8.7      25 Jul 2020 06:51  Phos  2.3     07-25  Mg     1.4     07-25                  RADIOLOGY & ADDITIONAL TESTS:  Results Reviewed:   Imaging Personally Reviewed:  Electrocardiogram Personally Reviewed:    COORDINATION OF CARE:  Care Discussed with Consultants/Other Providers [Y/N]:  Prior or Outpatient Records Reviewed [Y/N]:

## 2020-07-25 NOTE — PROGRESS NOTE ADULT - PROBLEM SELECTOR PLAN 1
-O2 via NC with goal O2 sat 94%.  -Monitor serum bicarb, clinical exam for hypercarbia  -Pulm consult recommending lasix for volume overload, and follow up ABG on Sunday to reassess pCO2 and pO2  -For now, will continue O2 support during daytime and nighttime. Trial off BIPAP last night with stable O2 saturations. Re-evaluate hypercarbia after diuresis.

## 2020-07-26 LAB
ANION GAP SERPL CALC-SCNC: 8 MMOL/L — SIGNIFICANT CHANGE UP (ref 5–17)
BASE EXCESS BLDA CALC-SCNC: 12.9 MMOL/L — HIGH (ref -2–2)
BASE EXCESS BLDA CALC-SCNC: 15.6 MMOL/L — HIGH (ref -2–2)
BUN SERPL-MCNC: 30 MG/DL — HIGH (ref 7–23)
CALCIUM SERPL-MCNC: 9.2 MG/DL — SIGNIFICANT CHANGE UP (ref 8.4–10.5)
CHLORIDE SERPL-SCNC: 101 MMOL/L — SIGNIFICANT CHANGE UP (ref 96–108)
CO2 BLDA-SCNC: 43 MMOL/L — HIGH (ref 22–30)
CO2 BLDA-SCNC: 43 MMOL/L — HIGH (ref 22–30)
CO2 SERPL-SCNC: 38 MMOL/L — HIGH (ref 22–31)
CREAT SERPL-MCNC: 0.9 MG/DL — SIGNIFICANT CHANGE UP (ref 0.5–1.3)
GAS PNL BLDA: SIGNIFICANT CHANGE UP
GAS PNL BLDA: SIGNIFICANT CHANGE UP
GLUCOSE BLDC GLUCOMTR-MCNC: 115 MG/DL — HIGH (ref 70–99)
GLUCOSE SERPL-MCNC: 116 MG/DL — HIGH (ref 70–99)
HCO3 BLDA-SCNC: 41 MMOL/L — HIGH (ref 21–29)
HCO3 BLDA-SCNC: 41 MMOL/L — HIGH (ref 21–29)
HCT VFR BLD CALC: 35.9 % — SIGNIFICANT CHANGE UP (ref 34.5–45)
HGB BLD-MCNC: 10.3 G/DL — LOW (ref 11.5–15.5)
HOROWITZ INDEX BLDA+IHG-RTO: SIGNIFICANT CHANGE UP
MAGNESIUM SERPL-MCNC: 1.5 MG/DL — LOW (ref 1.6–2.6)
MCHC RBC-ENTMCNC: 24.3 PG — LOW (ref 27–34)
MCHC RBC-ENTMCNC: 28.7 GM/DL — LOW (ref 32–36)
MCV RBC AUTO: 84.9 FL — SIGNIFICANT CHANGE UP (ref 80–100)
NRBC # BLD: 0 /100 WBCS — SIGNIFICANT CHANGE UP (ref 0–0)
PCO2 BLDA: 55 MMHG — HIGH (ref 32–46)
PCO2 BLDA: 76 MMHG — CRITICAL HIGH (ref 32–46)
PH BLDA: 7.35 — SIGNIFICANT CHANGE UP (ref 7.35–7.45)
PH BLDA: 7.48 — HIGH (ref 7.35–7.45)
PHOSPHATE SERPL-MCNC: 2.5 MG/DL — SIGNIFICANT CHANGE UP (ref 2.5–4.5)
PLATELET # BLD AUTO: 254 K/UL — SIGNIFICANT CHANGE UP (ref 150–400)
PO2 BLDA: 25 MMHG — CRITICAL LOW (ref 74–108)
PO2 BLDA: 39 MMHG — CRITICAL LOW (ref 74–108)
POTASSIUM SERPL-MCNC: 4.3 MMOL/L — SIGNIFICANT CHANGE UP (ref 3.5–5.3)
POTASSIUM SERPL-SCNC: 4.3 MMOL/L — SIGNIFICANT CHANGE UP (ref 3.5–5.3)
RBC # BLD: 4.23 M/UL — SIGNIFICANT CHANGE UP (ref 3.8–5.2)
RBC # FLD: 16.2 % — HIGH (ref 10.3–14.5)
SAO2 % BLDA: 37 % — LOW (ref 92–96)
SAO2 % BLDA: 75 % — LOW (ref 92–96)
SODIUM SERPL-SCNC: 147 MMOL/L — HIGH (ref 135–145)
WBC # BLD: 8.2 K/UL — SIGNIFICANT CHANGE UP (ref 3.8–10.5)
WBC # FLD AUTO: 8.2 K/UL — SIGNIFICANT CHANGE UP (ref 3.8–10.5)

## 2020-07-26 PROCEDURE — 99233 SBSQ HOSP IP/OBS HIGH 50: CPT | Mod: GC

## 2020-07-26 RX ORDER — MAGNESIUM OXIDE 400 MG ORAL TABLET 241.3 MG
400 TABLET ORAL EVERY 8 HOURS
Refills: 0 | Status: DISCONTINUED | OUTPATIENT
Start: 2020-07-26 | End: 2020-07-28

## 2020-07-26 RX ADMIN — RIVAROXABAN 20 MILLIGRAM(S): KIT at 12:51

## 2020-07-26 RX ADMIN — LISINOPRIL 2.5 MILLIGRAM(S): 2.5 TABLET ORAL at 05:44

## 2020-07-26 RX ADMIN — BUDESONIDE AND FORMOTEROL FUMARATE DIHYDRATE 2 PUFF(S): 160; 4.5 AEROSOL RESPIRATORY (INHALATION) at 06:12

## 2020-07-26 RX ADMIN — MAGNESIUM OXIDE 400 MG ORAL TABLET 400 MILLIGRAM(S): 241.3 TABLET ORAL at 05:44

## 2020-07-26 RX ADMIN — Medication 25 MILLIGRAM(S): at 05:44

## 2020-07-26 RX ADMIN — Medication 25 MILLIGRAM(S): at 17:53

## 2020-07-26 RX ADMIN — QUETIAPINE FUMARATE 25 MILLIGRAM(S): 200 TABLET, FILM COATED ORAL at 22:30

## 2020-07-26 RX ADMIN — MAGNESIUM OXIDE 400 MG ORAL TABLET 400 MILLIGRAM(S): 241.3 TABLET ORAL at 22:30

## 2020-07-26 RX ADMIN — MAGNESIUM OXIDE 400 MG ORAL TABLET 400 MILLIGRAM(S): 241.3 TABLET ORAL at 15:16

## 2020-07-26 RX ADMIN — Medication 40 MILLIGRAM(S): at 05:44

## 2020-07-26 RX ADMIN — BUDESONIDE AND FORMOTEROL FUMARATE DIHYDRATE 2 PUFF(S): 160; 4.5 AEROSOL RESPIRATORY (INHALATION) at 17:29

## 2020-07-26 NOTE — SWALLOW BEDSIDE ASSESSMENT ADULT - NS SPL SWALLOW CLINIC TRIAL FT
Cough response w/ nectar thick liquids via tsp.   Cough response w/ honey thick liquids via cup sips with support by SLP and independent self presentations. Pt w/ SIGNIFICANT IMPULSIVITY w/ cup presentations, leading to eventual cough response- s/s of aspiration.
Oral stage of the swallow c/b poor orientation, awareness w/ no attempt to manipulation or transport when ice chip was placed on labial surface and anterior portion of lingual surface.     No swallow triggered. No swallow response on saliva.   Limited visualization of oral cavity due to current mentation and guarded mouth posturing.

## 2020-07-26 NOTE — PROGRESS NOTE ADULT - PROBLEM SELECTOR PLAN 1
-O2 via NC with goal O2 sat 94%.  -Monitor serum bicarb, clinical exam for hypercarbia  -Repeat ABG on 7/26 pH 7.35, pCO2 76, pO2 25?? possibly lab error  or venous gas as clinical condition is unchanged   -For now, will continue O2 support during daytime and nighttime. Trial off BIPAP for 2 nights with stable O2 saturations while on supplemental O2. Worsening hypercapnia on repeat ABG  -Pulm consult recommending lasix for volume overload, dc pressure support and follow up ABG   -Pulm recs appreciated

## 2020-07-26 NOTE — PROGRESS NOTE ADULT - ATTENDING COMMENTS
Dr. Livia De Jesus  Division of Hospital Medicine  [facility]   Pager: [phone]    Patient seen and examined today with Team 7 Resident and Interns. Agree with above findings, assessment, and plan with the following additions/exceptions:    1. Acute on chronic hypercapneic, hypoxic respiratory failure: overall improved. more awake and interactive today. have trialed her of BIPAP for 2 nights and ABG this AM with pCO2 improved from 66 to 55 today. appreciate pulmonary recs. will not require BIPAP on discharge.  2. Acute on chronic systolic heart failure: respiratory status improved with diuresis but now with hypernatremia again. hold lasix 40mg daily today. will reassess Na tomorrow and either resume lasix at lower dose vs. change to metolazone instead.  3. Hypernatremia: initially resolved but now uptrending again with diuresis. will be difficult for her to get adequate free water intake with her thickened liquids. will discuss with son tomorrow regarding GOC and allowing patient to have water with the risk of aspiration.    D/C planning. Anticipate d/c home in 1-2 days pending Na and final diuretic regimen.    Rest as detailed in note above.

## 2020-07-26 NOTE — SWALLOW BEDSIDE ASSESSMENT ADULT - DIET PRIOR TO ADMI
finger foods, as per MD notation from communication w/ son
finger foods, as per MD notation from communication w/ son

## 2020-07-26 NOTE — PROGRESS NOTE ADULT - SUBJECTIVE AND OBJECTIVE BOX
INTERVAL EVENTS      OBJECTIVE    Vital Signs Last 24 Hrs  T(C): 36.4 (26 Jul 2020 04:10), Max: 36.7 (25 Jul 2020 21:11)  T(F): 97.5 (26 Jul 2020 04:10), Max: 98 (25 Jul 2020 21:11)  HR: 72 (26 Jul 2020 06:12) (64 - 74)  BP: 128/61 (26 Jul 2020 04:10) (93/57 - 128/61)  BP(mean): --  RR: 19 (26 Jul 2020 04:10) (18 - 20)  SpO2: 96% (26 Jul 2020 06:12) (96% - 99%)            MEDICATIONS  (STANDING):  budesonide 160 MICROgram(s)/formoterol 4.5 MICROgram(s) Inhaler 2 Puff(s) Inhalation two times a day  lisinopril 2.5 milliGRAM(s) Oral daily  magnesium oxide 400 milliGRAM(s) Oral every 8 hours  metoprolol tartrate 25 milliGRAM(s) Oral two times a day  QUEtiapine 25 milliGRAM(s) Oral at bedtime  rivaroxaban 20 milliGRAM(s) Oral daily    MEDICATIONS  (PRN):  albuterol/ipratropium for Nebulization. 3 milliLiter(s) Nebulizer four times a day PRN Shortness of Breath and/or Wheezing  bisacodyl Suppository 10 milliGRAM(s) Rectal daily PRN Constipation  haloperidol    Injectable 1 milliGRAM(s) IntraMuscular every 6 hours PRN agitation      LABORATORY                          10.3   8.20  )-----------( 254      ( 26 Jul 2020 07:09 )             35.9       07-26    147<H>  |  101  |  30<H>  ----------------------------<  116<H>  4.3   |  38<H>  |  0.90    Ca    9.2      26 Jul 2020 07:09  Phos  2.5     07-26  Mg     1.5     07-26 INTERVAL EVENTS  No acute events overnight  Has been off BIPAP and on 4 L of nasal cannula      OBJECTIVE    Vital Signs Last 24 Hrs  T(C): 36.4 (26 Jul 2020 04:10), Max: 36.7 (25 Jul 2020 21:11)  T(F): 97.5 (26 Jul 2020 04:10), Max: 98 (25 Jul 2020 21:11)  HR: 72 (26 Jul 2020 06:12) (64 - 74)  BP: 128/61 (26 Jul 2020 04:10) (93/57 - 128/61)  BP(mean): --  RR: 19 (26 Jul 2020 04:10) (18 - 20)  SpO2: 96% (26 Jul 2020 06:12) (96% - 99%)      PHYSICAL EXAM:  General: no acute distress  HEENT: normocephalic  Eyes: extraocular movements intact, pupils equal and reactive to light  Neck: cervical collar  Respiratory: non labored breathing, decreased breath sounds in bases  Cardiovascular: normal rate, systolic murmur  Gastrointestinal: abdomen soft, non tender, non distended  Extremities: lower extremity edema  Neurological: awake, no focal deficits      MEDICATIONS  (STANDING):  budesonide 160 MICROgram(s)/formoterol 4.5 MICROgram(s) Inhaler 2 Puff(s) Inhalation two times a day  lisinopril 2.5 milliGRAM(s) Oral daily  magnesium oxide 400 milliGRAM(s) Oral every 8 hours  metoprolol tartrate 25 milliGRAM(s) Oral two times a day  QUEtiapine 25 milliGRAM(s) Oral at bedtime  rivaroxaban 20 milliGRAM(s) Oral daily    MEDICATIONS  (PRN):  albuterol/ipratropium for Nebulization. 3 milliLiter(s) Nebulizer four times a day PRN Shortness of Breath and/or Wheezing  bisacodyl Suppository 10 milliGRAM(s) Rectal daily PRN Constipation  haloperidol    Injectable 1 milliGRAM(s) IntraMuscular every 6 hours PRN agitation      LABORATORY                          10.3   8.20  )-----------( 254      ( 26 Jul 2020 07:09 )             35.9       07-26    147<H>  |  101  |  30<H>  ----------------------------<  116<H>  4.3   |  38<H>  |  0.90    Ca    9.2      26 Jul 2020 07:09  Phos  2.5     07-26  Mg     1.5     07-26    ABG - ( 26 Jul 2020 12:12 )  pH, Arterial: 7.48  /  pCO2: 55    /  pO2: 39    / HCO3: 41    / Base Excess: 15.6  /  SaO2: 75          RADIOLOGY    Xray Chest 1 View- PORTABLE-Urgent (07.18.20 @ 14:56) >  Enteric tube within the stomach.  Pulmonary edema is unchanged.    < end of copied text >        CT Chest w/ IV Cont (07.15.20 @ 02:27) >  Small bilateral pleural effusion with atelectasis. Cardiomegaly. Nonspecific interlobular septal thickening and groundglass/patchy opacities in both lungs, which can be seen in pulmonary edema. Recommend clinical correlation to assess underlying pneumonia.   No bowel obstruction. Abdominal wall hernias as described.  Nonspecific mild bilateral perinephric stranding without hydroureteronephrosis. Diffuse prominent bladder wall with nonspecific perivesical stranding. Recommend clinical correlation to assess urinary tract infection.  Nonspecific stranding in the right lateral abdominal wall soft tissue, which may represent edema and/or cellulitis. Recommend direct visualization.    Additional findings as described.    < end of copied text >      Transthoracic Echocardiogram (07.16.20 @ 08:04) >  Severely dilated left ventricle.  Severely decreased left ventricular systolic function.  Diffuse left ventricular hypokinesis.  Severe functional mitral regurgitation.  Transcatheter aortic valve replacement. Mild-moderate paravalvular aortic regurgitation along the anterior aspect of the prosthesis.  Moderate-severe pulmonary hypertension. Estimated PASP 60 mmHg.    < end of copied text >

## 2020-07-26 NOTE — PROGRESS NOTE ADULT - PROBLEM SELECTOR PLAN 8
-will resume systemic anticoagulation at this time. Although patient remains a fall risk, patient has a history of recurrent thrombosis and limited mobility. No definitive signs of epidural hemorrhage on MRI. Risks/benefits discussed with son.

## 2020-07-26 NOTE — SWALLOW BEDSIDE ASSESSMENT ADULT - SWALLOW EVAL: RECOMMENDED DIET
Dysphagia 1 and honey thick liquids via TSP ONLY- NO CUP OR STRAW. Dysphagia 1 and honey thick liquids via TSP ONLY- NO CUP OR STRAW. Suggest GOC discussion w/ family as concern that pt will meet hydration needs orally given consistency and modality.

## 2020-07-26 NOTE — PROGRESS NOTE ADULT - ASSESSMENT
79 yo woman with heart failure reduced ejection fraction, TAVR, COPD, ?sleep apnea, schizoaffective disorder and pulmonary embolism initially admitted to the ICU with hypercapnic respiratory failure requiring non invasive mechanical ventilation. Now on general medicine floors. Pulmonary service consulted to assist in management.     Echocardiogram with decreased EF, severe functional mitral valve, mild to moderate paravalvular aortic regurgitation in the anterior aspect of the prosthesis, and moderate to severe pulmonary hypertension.     She has required BIPAP at night after leaving the ICU and we have been evaluating if she qualifies for home NIV. Recommendation was to hold off BIPAP for now and repeat ABG after diuresis to see if any improvement in hypercapnea. Today ABG without using BIPAP at night showed improvement in hypercapnea with pCO2 55 mmHg and pH 7.48 with bicarb 39 mEq/L.    Recommendations:  - Given worsening hypernatremia and elevated bicarb, may decrease lasix to 20 mg daily for now and monitor BMP  - Continue to hold BIPAP at night and check ABG tomorrow   - ABG today revealed improvement in pCO2 to 55 mmHg and she did not qualify for home NIV  - Continue Symbicort BID and duonebs PRN   - Cardiology follow up as an outpatient    --------------------------------------------------------  Yuriy Lemus, PGY-5  Pulmonary/Critical Care Fellow  Pager: [phone] ([place]), [phone] ([place])

## 2020-07-26 NOTE — SWALLOW BEDSIDE ASSESSMENT ADULT - SWALLOW EVAL: RECOMMENDED FEEDING/EATING TECHNIQUES
maintain upright posture during/after eating for 30 mins/oral hygiene/check mouth frequently for oral residue/pocketing/crush medication (when feasible)/no straws/small sips/bites/position upright (90 degrees)

## 2020-07-26 NOTE — PROGRESS NOTE ADULT - SUBJECTIVE AND OBJECTIVE BOX
Elías Martinez MD PGY1  Pager [phone]    Patient is a 80y old  Female who presents with a chief complaint of AMS (25 Jul 2020 07:27)      SUBJECTIVE/OVERNIGHT EVENTS: No events overnight. Nursing reports that patient continues to remove her oxygen overnight and will desat to 86%. Patient more alert this am, eyelids were open this morning and patient responded to her name, says germaine, and yes.    Unable to conduct ROS due to patient's condition    MEDICATIONS  (STANDING):  budesonide 160 MICROgram(s)/formoterol 4.5 MICROgram(s) Inhaler 2 Puff(s) Inhalation two times a day  furosemide    Tablet 40 milliGRAM(s) Oral daily  lisinopril 2.5 milliGRAM(s) Oral daily  metoprolol tartrate 25 milliGRAM(s) Oral two times a day  QUEtiapine 25 milliGRAM(s) Oral at bedtime  rivaroxaban 20 milliGRAM(s) Oral daily    MEDICATIONS  (PRN):  albuterol/ipratropium for Nebulization. 3 milliLiter(s) Nebulizer four times a day PRN Shortness of Breath and/or Wheezing  bisacodyl Suppository 10 milliGRAM(s) Rectal daily PRN Constipation  haloperidol    Injectable 1 milliGRAM(s) IntraMuscular every 6 hours PRN agitation    CAPILLARY BLOOD GLUCOSE      POCT Blood Glucose.: 103 mg/dL (25 Jul 2020 08:35)    I&O's Summary    25 Jul 2020 07:01  -  26 Jul 2020 07:00  --------------------------------------------------------  IN: 440 mL / OUT: 1325 mL / NET: -885 mL          Vital Signs Last 24 Hrs  T(C): 36.4 (26 Jul 2020 04:10), Max: 36.7 (25 Jul 2020 21:11)  T(F): 97.5 (26 Jul 2020 04:10), Max: 98 (25 Jul 2020 21:11)  HR: 72 (26 Jul 2020 06:12) (64 - 74)  BP: 128/61 (26 Jul 2020 04:10) (93/57 - 128/61)  BP(mean): --  RR: 19 (26 Jul 2020 04:10) (18 - 20)  SpO2: 96% (26 Jul 2020 06:12) (96% - 99%)    PHYSICAL EXAM:  GENERAL: NAD,  HEENT: enucleated orbits  NECK: Supple, No JVD  CHEST/LUNG: crackles bl  HEART: Regular rate and rhythm; No murmurs, rubs, or gallops  ABDOMEN: Soft, Nontender, Nondistended; Bowel sounds present  EXTREMITIES:  2+ Peripheral Pulses, non pitting pedal edema  NEUROLOGY: A+Ox1, moves all extremities  SKIN: excoriations on abdomen    LABS                        10.3   8.20  )-----------( 254      ( 26 Jul 2020 07:09 )             35.9                         9.4    7.41  )-----------( 198      ( 25 Jul 2020 06:53 )             33.0     07-26    147<H>  |  101  |  30<H>  ----------------------------<  116<H>  4.3   |  38<H>  |  0.90  07-25    144  |  100  |  31<H>  ----------------------------<  118<H>  3.5   |  36<H>  |  0.96    Ca    9.2      26 Jul 2020 07:09  Ca    8.7      25 Jul 2020 06:51  Phos  2.5     07-26  Mg     1.5     07-26         15 Jul 2020 07:04 Troponin x     / CK 27 U/L / CKMB x     / CKMB Units 2.3 ng/mL          ( 26 Jul 2020 07:09 ) pH: 7.35  /  pCO2: 76    /  pO2: 25    / HCO3: 41    / Base Excess: 12.9  /  SaO2: 37              ( 23 Jul 2020 17:01 ) pH: 7.39  /  pCO2: 66    /  pO2: 92    / HCO3: 39    / Base Excess: 12.1  /  SaO2: 97              ( 20 Jul 2020 06:17 ) pH: 7.40  /  pCO2: 67    /  pO2: 47    / HCO3: 40    / Base Excess: 13.3  /  SaO2: 84                    RADIOLOGY & ADDITIONAL TESTS:

## 2020-07-26 NOTE — SWALLOW BEDSIDE ASSESSMENT ADULT - NS ASR SWALLOW FINDINGS DISCUS
call placed to son, unable to hold discussion due to scheduling conflict, RN Elías Huffman MD/Physician/Nursing/Patient
Physician/Patient/Yazan JULES via phone./Nursing

## 2020-07-26 NOTE — SWALLOW BEDSIDE ASSESSMENT ADULT - SPECIFY REASON(S)
Order received/appreciated. Evaluation ordered to determine current swallow function and least restrictive diet.
Order received/appreciated. Evaluation ordered to determine current swallow function and least restrictive diet.

## 2020-07-26 NOTE — SWALLOW BEDSIDE ASSESSMENT ADULT - SLP PERTINENT HISTORY OF CURRENT PROBLEM
This is an 80F PMHx COPD (unknown level O2 at home), DM2, CHF, schizoaffective disorder brought in by relative due to altered mental status; found to be in hypoxemic and hypercapnic respiratory failure. History limited as communication with patient limited due to mental status and no relative present to provide details of HPI. Per ED staff, who were present when son initially brought her to hospital earlier in evening, altered mentation began on 11AM on day of presentation; she was noted to be more lethargic with decreased response to verbal stimuli and minimally ambulatory (though at baseline, patient dependent on others for activities of daily living).
This is an 80F PMHx COPD (unknown level O2 at home), DM2, CHF, schizoaffective disorder brought in by relative due to altered mental status; found to be in hypoxemic and hypercapnic respiratory failure. History limited as communication with patient limited due to mental status and no relative present to provide details of HPI. Per ED staff, who were present when son initially brought her to hospital earlier in evening, altered mentation began on 11AM on day of presentation; she was noted to be more lethargic with decreased response to verbal stimuli and minimally ambulatory (though at baseline, patient dependent on others for activities of daily living).

## 2020-07-26 NOTE — SWALLOW BEDSIDE ASSESSMENT ADULT - ORAL PHASE
Reduced control of bolus leading to suspected delay in the swallow trigger improved control of bolus - as rate /amount if controlled via tsp, more viscous consistency

## 2020-07-26 NOTE — SWALLOW BEDSIDE ASSESSMENT ADULT - CONSISTENCIES ADMINISTERED
honey thick liquids via cup ; nectar thick liquids via tsp and cup/honey thick/nectar thick puree thick/honey thick

## 2020-07-26 NOTE — SWALLOW BEDSIDE ASSESSMENT ADULT - SLP GENERAL OBSERVATIONS
Encountered pt awake with PT, sitting on the edge of the bed. Repositioned pt upright in bed w/ PT and RN following therapy intervention. (+)C-collar. RA (+).  Limited responsiveness.
Encountered pt resting in bed, however awoke to verbal and tactile stimulation.  services utilized, [facility] #[id number] [place]. Slight increase in verbal output and alertness as compared to previous assessment date. Frequent whispered speech noted, which was not able to be understood by the . Pt did express some words and phrases such as "thank you" in Farsi and English.

## 2020-07-26 NOTE — SWALLOW BEDSIDE ASSESSMENT ADULT - ASR SWALLOW DENTITION
upper dentures/dentures not present; edentulous/lower dentures
upper dentures/lower dentures/dentures not present; edentulous

## 2020-07-26 NOTE — PROGRESS NOTE ADULT - ATTENDING COMMENTS
Ms Quigley seems more alert today.  She spent a few nights off BiPAP and ABG today shows pCO2 55 which is improved.  Has some hypernatremia.  Would decrease diuretic dose.  Does not require BiPAP on discharge- her PCO2 is improved and she did not qualify for it.  While BiPAP can help with management of acute pulmonary edema and acute COPD exacerbations with hypercapnea, its role in these conditions when they are chronic is not proven.

## 2020-07-26 NOTE — SWALLOW BEDSIDE ASSESSMENT ADULT - COMMENTS
-CXR - Bilateral patchy opacities may represent multifocal pneumonia or pulmonary edema. Bilateral pleural effusions.   -CTH - No acute intracranial hemorrhage, large cortical infarct or mass effect. Partially visualized, age-indeterminate displaced fracture of the left C1 anterior and posterior arch.   -CT cervical spine - Age-indeterminate, displaced left C1 anterior and posterior arch fracture as described, which may be acute given mild prevertebral soft tissue stranding/edema and no significant sclerosis at the fracture margin.   -CT Chest + ABD/pelvis - Small bilateral pleural effusion with atelectasis. Cardiomegaly. Nonspecific interlobular septal thickening and ground glass/patchy opacities in both lungs, which can be seen in pulmonary edema. Abdominal wall hernias as described. Recommend clinical correlation to assess urinary tract infection. Nonspecific stranding in the right lateral abdominal wall soft tissue, which may represent edema and/or cellulitis.   ENT: Audible gurgling. +C-collar.  COVID negative x2. Due to the patient going into hypoxemic and hypercarbic respiratory failure 2/2 to CHF exacerbation, she was given 100% 2L NC, no improvement so was placed on nasal BiPAP (given her C-collar from her C1 fracture). Appreciated RD notes.    Neurosurgery; Fracture is likely subacute in nature and does not appear unstable. Hard cervical collar d/c’d in lieu of soft cervical collar based on recommendations. MRI reviewed - no definitive evidence for epidural hematoma. Outpatient neurosurgery follow-up.     Dysphagia history: Pt is new to this service. As per MD notation, pt eating finger foods at home, per conversation w. son. Call placed to son this date, however he was in a meeting and SLP not able to gain any additional information. BSE completed on July 20th with recommendations for honey thick liquids and puree solids.  No advancement in diet upon re-assessment. D/c planning, at that time. Service signed off 7/23. No s/s of aspiration   No oral pocketing

## 2020-07-27 DIAGNOSIS — J44.9 CHRONIC OBSTRUCTIVE PULMONARY DISEASE, UNSPECIFIED: ICD-10-CM

## 2020-07-27 LAB
ANION GAP SERPL CALC-SCNC: 12 MMOL/L — SIGNIFICANT CHANGE UP (ref 5–17)
BUN SERPL-MCNC: 31 MG/DL — HIGH (ref 7–23)
CALCIUM SERPL-MCNC: 9.1 MG/DL — SIGNIFICANT CHANGE UP (ref 8.4–10.5)
CHLORIDE SERPL-SCNC: 94 MMOL/L — LOW (ref 96–108)
CO2 SERPL-SCNC: 36 MMOL/L — HIGH (ref 22–31)
CREAT SERPL-MCNC: 0.91 MG/DL — SIGNIFICANT CHANGE UP (ref 0.5–1.3)
GLUCOSE BLDC GLUCOMTR-MCNC: 226 MG/DL — HIGH (ref 70–99)
GLUCOSE SERPL-MCNC: 214 MG/DL — HIGH (ref 70–99)
HCT VFR BLD CALC: 34.3 % — LOW (ref 34.5–45)
HGB BLD-MCNC: 10 G/DL — LOW (ref 11.5–15.5)
MAGNESIUM SERPL-MCNC: 1.4 MG/DL — LOW (ref 1.6–2.6)
MCHC RBC-ENTMCNC: 24.4 PG — LOW (ref 27–34)
MCHC RBC-ENTMCNC: 29.2 GM/DL — LOW (ref 32–36)
MCV RBC AUTO: 83.9 FL — SIGNIFICANT CHANGE UP (ref 80–100)
NRBC # BLD: 0 /100 WBCS — SIGNIFICANT CHANGE UP (ref 0–0)
PHOSPHATE SERPL-MCNC: 2.6 MG/DL — SIGNIFICANT CHANGE UP (ref 2.5–4.5)
PLATELET # BLD AUTO: 255 K/UL — SIGNIFICANT CHANGE UP (ref 150–400)
POTASSIUM SERPL-MCNC: 3.6 MMOL/L — SIGNIFICANT CHANGE UP (ref 3.5–5.3)
POTASSIUM SERPL-SCNC: 3.6 MMOL/L — SIGNIFICANT CHANGE UP (ref 3.5–5.3)
RBC # BLD: 4.09 M/UL — SIGNIFICANT CHANGE UP (ref 3.8–5.2)
RBC # FLD: 16.3 % — HIGH (ref 10.3–14.5)
SODIUM SERPL-SCNC: 142 MMOL/L — SIGNIFICANT CHANGE UP (ref 135–145)
WBC # BLD: 7.52 K/UL — SIGNIFICANT CHANGE UP (ref 3.8–10.5)
WBC # FLD AUTO: 7.52 K/UL — SIGNIFICANT CHANGE UP (ref 3.8–10.5)

## 2020-07-27 PROCEDURE — 99232 SBSQ HOSP IP/OBS MODERATE 35: CPT | Mod: GC

## 2020-07-27 PROCEDURE — 99233 SBSQ HOSP IP/OBS HIGH 50: CPT | Mod: GC

## 2020-07-27 PROCEDURE — 71045 X-RAY EXAM CHEST 1 VIEW: CPT | Mod: 26

## 2020-07-27 RX ORDER — NYSTATIN CREAM 100000 [USP'U]/G
1 CREAM TOPICAL ONCE
Refills: 0 | Status: COMPLETED | OUTPATIENT
Start: 2020-07-27 | End: 2020-07-28

## 2020-07-27 RX ORDER — FUROSEMIDE 40 MG
20 TABLET ORAL DAILY
Refills: 0 | Status: DISCONTINUED | OUTPATIENT
Start: 2020-07-27 | End: 2020-07-28

## 2020-07-27 RX ORDER — MAGNESIUM SULFATE 500 MG/ML
2 VIAL (ML) INJECTION ONCE
Refills: 0 | Status: DISCONTINUED | OUTPATIENT
Start: 2020-07-27 | End: 2020-07-27

## 2020-07-27 RX ADMIN — Medication 25 MILLIGRAM(S): at 17:42

## 2020-07-27 RX ADMIN — Medication 3 MILLILITER(S): at 05:25

## 2020-07-27 RX ADMIN — QUETIAPINE FUMARATE 25 MILLIGRAM(S): 200 TABLET, FILM COATED ORAL at 21:25

## 2020-07-27 RX ADMIN — HALOPERIDOL DECANOATE 1 MILLIGRAM(S): 100 INJECTION INTRAMUSCULAR at 00:11

## 2020-07-27 RX ADMIN — BUDESONIDE AND FORMOTEROL FUMARATE DIHYDRATE 2 PUFF(S): 160; 4.5 AEROSOL RESPIRATORY (INHALATION) at 05:43

## 2020-07-27 RX ADMIN — RIVAROXABAN 20 MILLIGRAM(S): KIT at 13:51

## 2020-07-27 RX ADMIN — Medication 25 MILLIGRAM(S): at 05:24

## 2020-07-27 RX ADMIN — Medication 50 GRAM(S): at 09:00

## 2020-07-27 RX ADMIN — LISINOPRIL 2.5 MILLIGRAM(S): 2.5 TABLET ORAL at 05:26

## 2020-07-27 NOTE — PROGRESS NOTE ADULT - ASSESSMENT
79 yo woman with heart failure reduced ejection fraction, TAVR, COPD, ?sleep apnea, schizoaffective disorder and pulmonary embolism initially admitted to the ICU with hypercapnic respiratory failure requiring non invasive mechanical ventilation. Now on general medicine floors. Pulmonary service consulted to assist in management.     Echocardiogram with decreased EF, severe functional mitral valve, mild to moderate paravalvular aortic regurgitation in the anterior aspect of the prosthesis, and moderate to severe pulmonary hypertension.     She has required BIPAP at night after leaving the ICU and we have been evaluating if she qualifies for home NIV. Recommendation was to hold off BIPAP for now and repeat ABG after diuresis to see if any improvement in hypercapnea. Today ABG without using BIPAP at night showed improvement in hypercapnea with pCO2 55 mmHg and pH 7.48 with bicarb 39 mEq/L.    Recommendations:  - Given worsening hypernatremia and elevated bicarb, may decrease lasix to 20 mg daily for now and monitor BMP  - given patient's CO2 improved and mental status is much better today than last week, patient will not need any BiLevel   - ABG yesterday revealed improvement in pCO2 to 55 mmHg and she did not qualify for home NIV  - Continue Symbicort BID and duonebs PRN   - Cardiology follow up as an outpatient    Sherman Mcclellan, PGY-2  Internal Medicine  Pulmonary Consult Resident  Pager [phone] 79 yo woman with heart failure reduced ejection fraction, TAVR, COPD, ?sleep apnea, schizoaffective disorder and pulmonary embolism initially admitted to the ICU with hypercapnic respiratory failure requiring non invasive mechanical ventilation. Now on general medicine floors. Pulmonary service consulted to assist in management.     Echocardiogram with decreased EF, severe functional mitral valve, mild to moderate paravalvular aortic regurgitation in the anterior aspect of the prosthesis, and moderate to severe pulmonary hypertension.     She has required BIPAP at night after leaving the ICU and we have been evaluating if she qualifies for home NIV. Recommendation was to hold off BIPAP for now and repeat ABG after diuresis to see if any improvement in hypercapnea. Today ABG without using BIPAP at night showed improvement in hypercapnea with pCO2 55 mmHg and pH 7.48 with bicarb 39 mEq/L.    Recommendations:  - Given worsening hypernatremia and elevated bicarb, may decrease lasix to 20 mg daily for now and monitor BMP  - given patient's CO2 improved and mental status is much better today than last week, patient will not need any BiLevel   - ABG yesterday revealed improvement in pCO2 to 55 mmHg and she did not qualify for home NIV  - Continue Symbicort BID and duonebs PRN   - Cardiology follow up as an outpatient    Sherman Mcclellan, PGY-2  Internal Medicine  Pulmonary Consult Resident  Pager [phone]    *Await attending attestation* 81 yo woman with heart failure reduced ejection fraction, TAVR, COPD, ?sleep apnea, schizoaffective disorder and pulmonary embolism initially admitted to the ICU with hypercapnic respiratory failure requiring non invasive mechanical ventilation. Now on general medicine floors. Pulmonary service consulted to assist in management.     Echocardiogram with decreased EF, severe functional mitral valve, mild to moderate paravalvular aortic regurgitation in the anterior aspect of the prosthesis, and moderate to severe pulmonary hypertension.     She has required BIPAP at night after leaving the ICU and we have been evaluating if she qualifies for home NIV. Recommendation was to hold off BIPAP for now and repeat ABG after diuresis to see if any improvement in hypercapnea. Today ABG without using BIPAP at night showed improvement in hypercapnea with pCO2 55 mmHg and pH 7.48 with bicarb 39 mEq/L.    Recommendations:  - Given worsening hypernatremia and elevated bicarb, may decrease lasix to 20 mg daily for now and monitor BMP  - given patient's CO2 improved and mental status is much better today than last week, patient will not need any BiLevel   - ABG yesterday revealed improvement in pCO2 to 55 mmHg and she did not qualify for home NIV  - Continue Symbicort BID and duonebs PRN   - Cardiology follow up as an outpatient  - No further workup from a pulmonary standpoint. Please call back with any questions.    Sherman Mcclellan, PGY-2  Internal Medicine  Pulmonary Consult Resident  Pager [phone]    *Await attending attestation*

## 2020-07-27 NOTE — PROGRESS NOTE ADULT - SUBJECTIVE AND OBJECTIVE BOX
Not complete Steff Davis MD PGY1  Pager [phone]      KARLEY CHANDLER  80y  Female    Complaints: hypercarbic/hypoxic respiratory failure  Subjective: Yesterday was seen by S&S; patient could not advance because pt is too impulsive and honey-thick would have to be given though teaspoon; cannot do cup. Overnight patient was agitated and desatted to 70s, was placed on NRB but continued to be agitated. Was placed on 6 L NC and sats improved to 90s. Has since been weaned to 4L. CXR done last night appeared improved from previous.  This am patient was sleeping but arousable; was not responsive to questions.     [X] : [id number]        Vital Signs Last 24 Hrs  T(C): 36.4 (27 Jul 2020 11:51), Max: 36.7 (27 Jul 2020 04:24)  T(F): 97.6 (27 Jul 2020 11:51), Max: 98 (27 Jul 2020 04:24)  HR: 96 (27 Jul 2020 11:51) (82 - 96)  BP: 96/68 (27 Jul 2020 12:00) (96/68 - 156/77)  BP(mean): --  RR: 18 (27 Jul 2020 12:00) (18 - 18)  SpO2: 96% (27 Jul 2020 12:00) (92% - 100%)      PHYSICAL EXAM:  GENERAL: NAD, resting, diffuse fine tremor  HEENT: NCAT, sleeping  NECK: Supple, No JVD  CHEST/LUNG: CTAB, no wheezing, crackles  HEART: Regular rate and rhythm; No murmurs, rubs, or gallops  ABDOMEN: Soft, Nontender, distended; +BS  EXTREMITIES:  2+ Peripheral Pulses, no edema  NEUROLOGY: could not assess AAOx3, asleep, but arousable to voice and light touch; moves all extremities  SKIN: excoriations on abdomen    Consultant(s) Notes Reviewed:  [x ] YES  [ ] NO  Care Discussed with Consultants/Other Providers [ x] YES  [ ] NO    LABS:          RADIOLOGY & ADDITIONAL TESTS:    Imaging Personally Reviewed:  [x] YES  [ ] NO    MEDICATIONS  (STANDING):  budesonide 160 MICROgram(s)/formoterol 4.5 MICROgram(s) Inhaler 2 Puff(s) Inhalation two times a day  furosemide    Tablet 20 milliGRAM(s) Oral daily  lisinopril 2.5 milliGRAM(s) Oral daily  magnesium oxide 400 milliGRAM(s) Oral every 8 hours  metoprolol tartrate 25 milliGRAM(s) Oral two times a day  QUEtiapine 25 milliGRAM(s) Oral at bedtime  rivaroxaban 20 milliGRAM(s) Oral daily    MEDICATIONS  (PRN):  albuterol/ipratropium for Nebulization. 3 milliLiter(s) Nebulizer four times a day PRN Shortness of Breath and/or Wheezing  bisacodyl Suppository 10 milliGRAM(s) Rectal daily PRN Constipation  haloperidol    Injectable 1 milliGRAM(s) IntraMuscular every 6 hours PRN agitation      HEALTH ISSUES - PROBLEM Dx:  Palliative care encounter: Palliative care encounter  Functional quadriplegia: Functional quadriplegia  Advanced care planning/counseling discussion: Advanced care planning/counseling discussion  Goals of care, counseling/discussion: Goals of care, counseling/discussion  Pulmonary embolism: Pulmonary embolism  Dysphagia, unspecified type: Dysphagia, unspecified type  Schizoaffective disorder, unspecified type: Schizoaffective disorder, unspecified type  Metabolic encephalopathy: Metabolic encephalopathy  Acute on chronic systolic congestive heart failure: Acute on chronic systolic congestive heart failure  Acute on chronic respiratory failure with hypercapnia: Acute on chronic respiratory failure with hypercapnia  Community acquired pneumonia: Community acquired pneumonia  Closed nondisplaced fracture of posterior arch of first cervical vertebra, initial encounter: Closed nondisplaced fracture of posterior arch of first cervical vertebra, initial encounter  Diabetes mellitus: Diabetes mellitus  Hypotension: Hypotension  Hypernatremia: Hypernatremia  Schizoaffective disorder: Schizoaffective disorder Steff Davis MD PGY1  Pager [phone]      KARLEY CHANDLER  80y  Female    Complaints: hypercarbic/hypoxic respiratory failure  Subjective: Yesterday was seen by S&S; patient could not advance because pt is too impulsive and honey-thick would have to be given though teaspoon; cannot do cup. Overnight patient was agitated and desatted to 70s, was placed on NRB but continued to be agitated. Was placed on 6 L NC and sats improved to 90s. Has since been weaned to 4L. CXR done last night appeared improved from previous.  This am patient was sleeping but arousable; was not responsive to questions.     [X] : [id number]    MEDICATIONS  (STANDING):  budesonide 160 MICROgram(s)/formoterol 4.5 MICROgram(s) Inhaler 2 Puff(s) Inhalation two times a day  furosemide    Tablet 20 milliGRAM(s) Oral daily  lisinopril 2.5 milliGRAM(s) Oral daily  magnesium oxide 400 milliGRAM(s) Oral every 8 hours  metoprolol tartrate 25 milliGRAM(s) Oral two times a day  QUEtiapine 25 milliGRAM(s) Oral at bedtime  rivaroxaban 20 milliGRAM(s) Oral daily    MEDICATIONS  (PRN):  albuterol/ipratropium for Nebulization. 3 milliLiter(s) Nebulizer four times a day PRN Shortness of Breath and/or Wheezing  bisacodyl Suppository 10 milliGRAM(s) Rectal daily PRN Constipation  haloperidol    Injectable 1 milliGRAM(s) IntraMuscular every 6 hours PRN agitation    Vital Signs Last 24 Hrs  T(C): 36.4 (27 Jul 2020 11:51), Max: 36.7 (27 Jul 2020 04:24)  T(F): 97.6 (27 Jul 2020 11:51), Max: 98 (27 Jul 2020 04:24)  HR: 96 (27 Jul 2020 11:51) (82 - 96)  BP: 96/68 (27 Jul 2020 12:00) (96/68 - 156/77)  BP(mean): --  RR: 18 (27 Jul 2020 12:00) (18 - 18)  SpO2: 96% (27 Jul 2020 12:00) (92% - 100%)      PHYSICAL EXAM:  GENERAL: NAD, resting, diffuse fine tremor  HEENT: NCAT, sleeping  NECK: Supple, No JVD  CHEST/LUNG: CTAB, no wheezing, crackles  HEART: Regular rate and rhythm; No murmurs, rubs, or gallops  ABDOMEN: Soft, Nontender, distended; +BS  EXTREMITIES:  2+ Peripheral Pulses, no edema  NEUROLOGY: could not assess AAOx3, asleep, but arousable to voice and light touch; moves all extremities  SKIN: excoriations on abdomen      LABS:                          10.0   7.52  )-----------( 255      ( 27 Jul 2020 07:02 )             34.3     07-27    142  |  94<L>  |  31<H>  ----------------------------<  214<H>  3.6   |  36<H>  |  0.91    Ca    9.1      27 Jul 2020 07:02  Phos  2.6     07-27  Mg     1.4     07-27            RADIOLOGY & ADDITIONAL TESTS:    Imaging Personally Reviewed:  no new imaging to review        Consultant(s) Notes Reviewed:  [x ] YES  [ ] NO  Care Discussed with Consultants/Other Providers [ x] YES  [ ] NO

## 2020-07-27 NOTE — PROGRESS NOTE ADULT - SUBJECTIVE AND OBJECTIVE BOX
Interval Events: Patient was seen and examined at bedside. Overnight patient kept removing her nasal canula, and desaturated to 74% and had to put on restraints.   -Used FarOLSET  Vanesa, ID #[id number] initially and switched to Jacqui at request of  due to not understanding patient. Jacqui  named Jasmeet, ID #[id number].   -Patient reports that she has no breathing issues at this moment, and was confused as to why she's on oxygen. Explained to patient that she's on oxygen because she keeps desaturating. A&Ox1 at baseline but very comfortable and not in acute distress. Not fighting.     REVIEW OF SYSTEMS:  Constitutional: [ ] fevers [ ] chills [ ] weight loss [ ] weight gain  CV: [ ] chest pain [ ] orthopnea [ ] palpitations [ ] murmur  Resp: [ ] cough [ ] shortness of breath [ ] dyspnea [ ] wheezing [ ] sputum [ ] hemoptysis  [x] All other systems negative  [ ] Unable to assess ROS because ________    OBJECTIVE:  ICU Vital Signs Last 24 Hrs  T(C): 36.4 (27 Jul 2020 08:50), Max: 36.7 (27 Jul 2020 04:24)  T(F): 97.6 (27 Jul 2020 08:50), Max: 98 (27 Jul 2020 04:24)  HR: 84 (27 Jul 2020 08:50) (83 - 96)  BP: 113/66 (27 Jul 2020 08:50) (113/66 - 156/77)  BP(mean): --  ABP: --  ABP(mean): --  RR: 18 (27 Jul 2020 08:50) (18 - 18)  SpO2: 94% (27 Jul 2020 08:50) (92% - 100%)        07-26 @ 07:01  -  07-27 @ 07:00  --------------------------------------------------------  IN: 1030 mL / OUT: 1000 mL / NET: 30 mL      CAPILLARY BLOOD GLUCOSE      POCT Blood Glucose.: 226 mg/dL (27 Jul 2020 08:04)    PHYSICAL EXAM:  General: no acute distress. resting comfortably in bed  HEENT: normocephalic  Eyes: extraocular movements intact, pupils equal and reactive to light  Neck: cervical collar  Respiratory: non labored breathing, decreased breath sounds in bases  Cardiovascular: normal rate, systolic murmur  Gastrointestinal: abdomen soft, non tender, non distended  Extremities: lower extremity edema  Neurological: awake, no focal deficits      HOSPITAL MEDICATIONS:  MEDICATIONS  (STANDING):  budesonide 160 MICROgram(s)/formoterol 4.5 MICROgram(s) Inhaler 2 Puff(s) Inhalation two times a day  lisinopril 2.5 milliGRAM(s) Oral daily  magnesium oxide 400 milliGRAM(s) Oral every 8 hours  magnesium sulfate  IVPB 2 Gram(s) IV Intermittent once  metoprolol tartrate 25 milliGRAM(s) Oral two times a day  QUEtiapine 25 milliGRAM(s) Oral at bedtime  rivaroxaban 20 milliGRAM(s) Oral daily    MEDICATIONS  (PRN):  albuterol/ipratropium for Nebulization. 3 milliLiter(s) Nebulizer four times a day PRN Shortness of Breath and/or Wheezing  bisacodyl Suppository 10 milliGRAM(s) Rectal daily PRN Constipation  haloperidol    Injectable 1 milliGRAM(s) IntraMuscular every 6 hours PRN agitation      LABS:                        10.0   7.52  )-----------( 255      ( 27 Jul 2020 07:02 )             34.3     Hgb Trend: 10.0<--, 10.3<--, 9.4<--, 9.7<--, 10.5<--  07-27    142  |  94<L>  |  31<H>  ----------------------------<  214<H>  3.6   |  36<H>  |  0.91    Ca    9.1      27 Jul 2020 07:02  Phos  2.6     07-27  Mg     1.4     07-27      Creatinine Trend: 0.91<--, 0.90<--, 0.96<--, 0.98<--, 0.85<--, 0.64<--      Arterial Blood Gas:  07-26 @ 12:12  7.48/55/39/41/75/15.6  ABG lactate: --  Arterial Blood Gas:  07-26 @ 07:09  7.35/76/25/41/37/12.9  ABG lactate: --      MICROBIOLOGY:     RADIOLOGY:  [ ] Reviewed and interpreted by me

## 2020-07-27 NOTE — PROGRESS NOTE ADULT - ATTENDING COMMENTS
Dr. Livia De Jesus  Division of Hospital Medicine  [facility]   Pager: [phone]    Patient seen and examined today with Team 7 Resident and Interns. Agree with above findings, assessment, and plan with the following additions/exceptions:    1. Acute on chronic hypercapneic, hypoxic respiratory failure: overall improved. awake and interactive. have trialed her of BIPAP for> 2 nights and ABG with pCO2 improved from 66 to 55. appreciate pulmonary recs. will not require BIPAP on discharge. does not qualify.  2. Acute on chronic systolic heart failure: respiratory status improved. decrease lasix to 20mg PO daily in setting of hypernatremia.   3. Hypernatremia: improved. will decrease her lasix as above and discuss with son today regarding GOC and allowing patient to have water with the risk of aspiration as will be hard for her to maintain adequate free water intake with thickened liquids.    Stable for discharge to home with home care as per family's wishes.  Discharge planning time spent: 42 minutes    Rest as detailed in note above.

## 2020-07-27 NOTE — PROGRESS NOTE ADULT - PROBLEM SELECTOR PLAN 9
-DNR/DNI in chart  -Plan is for patient to return home with care from family members pending volume optimization, CM is following.  -Palliative care f/u appreciated -DNR/DNI in chart  -Plan is for patient to return home with care from family members pending volume optimization, CM is following.  - Discuss goals of care with health care proxy  - Family declined hospice. Plan to go home w/ home care, O2, outpatient cardiology f/u   -Palliative care f/u appreciated -will resume systemic anticoagulation at this time. Although patient remains a fall risk, patient has a history of recurrent thrombosis and limited mobility. No definitive signs of epidural hemorrhage on MRI. Risks/benefits discussed with son.

## 2020-07-27 NOTE — PROGRESS NOTE ADULT - PROBLEM SELECTOR PLAN 1
-O2 via NC with goal O2 sat 94%.  -Monitor serum bicarb, clinical exam for hypercarbia  -Repeat ABG on 7/26 pH 7.35, pCO2 76, pO2 25?? possibly lab error  or venous gas as clinical condition is unchanged   -For now, will continue O2 support during daytime and nighttime. Trial off BIPAP for 2 nights with stable O2 saturations while on supplemental O2. Worsening hypercapnia on repeat ABG  -Pulm consult recommending lasix for volume overload, dc pressure support and follow up ABG   -Pulm recs appreciated -O2 via NC with goal O2 sat 94%.  -Monitor serum bicarb, clinical exam for hypercarbia  -Repeat ABG on 7/26 pH 7.35, pCO2 76, pO2 25?? possibly lab error  or venous gas as clinical condition is unchanged   -For now, will continue O2 support during daytime and nighttime. Trial off BIPAP for 2 nights with stable O2 saturations while on supplemental O2. Worsening hypercapnia on repeat ABG  -Pulm consult recommending lasix for volume overload, dc pressure support and follow up ABG   - Pulm recs appreciated  - Does not quality for NIV  - Dec lasix to 20 mg daily improved. O2 via NC with goal O2 sat 94%.  -For now, will continue O2 support during daytime and nighttime. Has remained off  BIPAP for >2 nights with stable O2 saturations while on supplemental O2. repeat ABG off BIPAP showing improved pCO2, thus does not qualify for NIV upon discharge.  - Pulm recs appreciated  - decrease lasix to 20mg PO daily, which she will be discharged on

## 2020-07-27 NOTE — PROGRESS NOTE ADULT - PROBLEM SELECTOR PROBLEM 8
Pulmonary embolism Closed nondisplaced fracture of posterior arch of first cervical vertebra, initial encounter

## 2020-07-27 NOTE — PROGRESS NOTE ADULT - PROBLEM SELECTOR PLAN 3
-Baseline prior to admission is alert, oriented to self only, able to ambulate with assistance, requires assistance with feeding  -Continue management of acute medical issues as described.   -Limit sedatives/narcotics as able. Attempt to maintain sleep-wake cycle.  -Haloperidol PRN for agitation. Monitor QTc.  -Continue seroquel 25 mg PO qhs for intermittent agitation stable.  - continue symbicort BID in lieu of elipta (home medication)  - c/w ciaran PRN

## 2020-07-27 NOTE — PROGRESS NOTE ADULT - SUBJECTIVE AND OBJECTIVE BOX
Authored by Dereck Lewis MD ([phone]) [facility]  Internal Medicine  Team 7     Patient is a 80y old  Female who presents with a chief complaint of AMS (26 Jul 2020 13:15)      SUBJECTIVE / OVERNIGHT EVENTS:    ADDITIONAL REVIEW OF SYSTEMS:    MEDICATIONS  (STANDING):  budesonide 160 MICROgram(s)/formoterol 4.5 MICROgram(s) Inhaler 2 Puff(s) Inhalation two times a day  lisinopril 2.5 milliGRAM(s) Oral daily  magnesium oxide 400 milliGRAM(s) Oral every 8 hours  metoprolol tartrate 25 milliGRAM(s) Oral two times a day  QUEtiapine 25 milliGRAM(s) Oral at bedtime  rivaroxaban 20 milliGRAM(s) Oral daily    MEDICATIONS  (PRN):  albuterol/ipratropium for Nebulization. 3 milliLiter(s) Nebulizer four times a day PRN Shortness of Breath and/or Wheezing  bisacodyl Suppository 10 milliGRAM(s) Rectal daily PRN Constipation  haloperidol    Injectable 1 milliGRAM(s) IntraMuscular every 6 hours PRN agitation      CAPILLARY BLOOD GLUCOSE      POCT Blood Glucose.: 115 mg/dL (26 Jul 2020 08:27)    I&O's Summary    25 Jul 2020 07:01  -  26 Jul 2020 07:00  --------------------------------------------------------  IN: 440 mL / OUT: 1325 mL / NET: -885 mL    26 Jul 2020 07:01  -  27 Jul 2020 06:24  --------------------------------------------------------  IN: 780 mL / OUT: 1000 mL / NET: -220 mL        PHYSICAL EXAM:  Vital Signs Last 24 Hrs  T(C): 36.7 (27 Jul 2020 04:24), Max: 36.7 (27 Jul 2020 04:24)  T(F): 98 (27 Jul 2020 04:24), Max: 98 (27 Jul 2020 04:24)  HR: 83 (27 Jul 2020 05:44) (83 - 96)  BP: 136/83 (27 Jul 2020 04:24) (115/56 - 156/77)  BP(mean): --  RR: 18 (27 Jul 2020 04:24) (18 - 18)  SpO2: 100% (27 Jul 2020 05:44) (92% - 100%)    GENERAL: No acute distress, well-developed  HEAD:  Atraumatic, Normocephalic  EYES: EOMI, PERRLA, conjunctiva and sclera clear  NECK: Supple, no lymphadenopathy, no JVD  CHEST/LUNG: CTAB; No wheezes, rales, or rhonchi  HEART: Regular rate and rhythm; No murmurs, rubs, or gallops  ABDOMEN: Soft, non-tender, non-distended; normal bowel sounds, no organomegaly  EXTREMITIES:  2+ peripheral pulses b/l, No clubbing, cyanosis, or edema  NEUROLOGY: A&O x 3, no focal deficits  SKIN: No rashes or lesions    LABS:                        10.3   8.20  )-----------( 254      ( 26 Jul 2020 07:09 )             35.9     07-26    147<H>  |  101  |  30<H>  ----------------------------<  116<H>  4.3   |  38<H>  |  0.90    Ca    9.2      26 Jul 2020 07:09  Phos  2.5     07-26  Mg     1.5     07-26                  RADIOLOGY & ADDITIONAL TESTS:  Results Reviewed:   Imaging Personally Reviewed:  Electrocardiogram Personally Reviewed:    COORDINATION OF CARE:  Care Discussed with Consultants/Other Providers [Y/N]:  Prior or Outpatient Records Reviewed [Y/N]:

## 2020-07-27 NOTE — PROGRESS NOTE ADULT - PROBLEM SELECTOR PLAN 7
Neurosurgery recommendations appreciated. Fracture is likely subacute in nature and does not appear unstable.  -Will d/c hard cervical collar in lieu of soft cervical collar based on recommendations.  -MRI reviewed - no definitive evidence for epidural hematoma  -Outpatient neurosurgery follow-up -Seroquel PO QHS  -Haldol IM PRN.

## 2020-07-27 NOTE — CHART NOTE - NSCHARTNOTEFT_GEN_A_CORE
Chart reviewed:  Pt is an 81 yo female with PMH of COPD, DM2, CHF, schizoaffective disorder, who presented with AMS, admitted 7/15. Pt presented in shock with hypercarbic respiratory failure, admitted to MICU for ventilatory and pressure support. Transferred to medical floor ([address] [place] [address]) 7/19. "Imaging notable for CT head demonstrating nondisplaced C1 fx and findings suspicious for epidural hematoma, CT chest with diffuse ground glass opacity suspicious for pneumonia or CHF."    Dysphagia hx: Swallow bedside assessment 7/20 rec for NPO. Palliative care following.   Initially on tube feeds, pt pulled NG tube out on 7/20. Plan for re-evaluation on 7/22. Recommendations for honey thick liquids via tsp and dysphagia 1 solids.     The patient was encountered in bed, eyes minimally open, alert, and mildly restless. Breakfast tray at bedside, untouched. Soft collar removed. SLP assisted w/ breakfast this morning. Utilized  services #[id number] [facility]as per  pt speaking Jacqui; [facility]. Offered honey and nectar thick liquids via tsp and puree solids. Orientation/reception and ability to strip the bolus from the tsp was effective. No anterior leakage. No oral pocketing. Adequate mastication/manipulation present. Pharyngeal phase c/b suspected mild delay in swallow function, however no s/s of aspiration with 6oz of liquids consumed;3 oz nectar and 3 oz honey thick liquids via tsp.  Pt presents with an mita-pharyngeal dysphagia. Purposeful proactive rounding reinforced and 5 Ps addressed. Pt left in no distress.     D/w nursing and MD team via phone    Recommendations   Dysphagia 1 and nectar thick liquids via tsp   NO STRAW  NO CUP  Medication to be crushed  1:1 w/ all po intake  Monitor tolerance   Aspiration precautions   Do not feed if not awake/alert   Monitor for oral pocketing         Cinthia Ramos MS CCC-SLP pager [phone]

## 2020-07-27 NOTE — PROGRESS NOTE ADULT - PROBLEM SELECTOR PROBLEM 7
Closed nondisplaced fracture of posterior arch of first cervical vertebra, initial encounter Schizoaffective disorder, unspecified type

## 2020-07-27 NOTE — PROGRESS NOTE ADULT - ASSESSMENT
80 Year old female, Farsi speaking, hx of chronic systolic congestive heart failure, obstructive sleep apnea, copd, schizoaffective disorder, pulmonary embolism presenting with lethargy and confusion. The patient presented in shock with hypercarbic respiratory failure. Stepped up to MICU for ventilatory and pressure support. Now stepped down to medical floor. Imaging notable for CT head demonstrating nondisplaced C1 fx and unable to R/O spinal epidural hematoma, CT chest with diffuse ground glass opacity suspicious for pneumonia or CHF. Labs notable for normal wbc, hypernatremia and hypercarbia with elevated bicarbonate 80 Year old female, Farsi speaking, hx of chronic systolic congestive heart failure, obstructive sleep apnea, copd, schizoaffective disorder, pulmonary embolism presenting with lethargy and confusion. The patient presented in shock with hypercarbic respiratory failure. Stepped up to MICU for ventilatory and pressure support. Now stepped down to medical floor. Imaging notable for CT head demonstrating nondisplaced C1 fx and unable to R/O spinal epidural hematoma, CT chest with diffuse ground glass opacity suspicious for pneumonia or CHF. Labs notable for normal wbc, hypernatremia and hypercarbia with elevated bicarbonate. Now with labs showing improvement in hypercapnea and hypernatremia/elevated bicarb. Desats from last night have resolved.

## 2020-07-27 NOTE — PROGRESS NOTE ADULT - PROBLEM SELECTOR PLAN 4
-Seroquel PO QHS  -Haldol IM PRN. - Likely 2/2 to diuresis and poor PO free water intake.  - Now improving 147 --> 142  - Encourage free water/PO intake.  -trend BMP

## 2020-07-27 NOTE — PROGRESS NOTE ADULT - ATTENDING COMMENTS
Patient seen and examined.  Data reviewed.  Agree with plan as outlined above.  please reconsult as needed.

## 2020-07-27 NOTE — PROGRESS NOTE ADULT - PROBLEM SELECTOR PLAN 5
-Worsening with diuresis 144-->147  -will encourage free water/PO intake.  -trend BMP - Likely 2/2 to diuresis   - Now improving 147 --> 142  - Encourage free water/PO intake.  -trend BMP appears back to baseline. which prior to admission was alert, oriented to self only, able to ambulate with assistance, requires assistance with feeding  -Continue management of acute medical issues as described.   -Limit sedatives/narcotics as able. Attempt to maintain sleep-wake cycle.  -Haloperidol PRN for agitation. Monitor QTc.  -Continue seroquel 25 mg PO qhs for intermittent agitation

## 2020-07-27 NOTE — PROGRESS NOTE ADULT - PROBLEM SELECTOR PLAN 8
-will resume systemic anticoagulation at this time. Although patient remains a fall risk, patient has a history of recurrent thrombosis and limited mobility. No definitive signs of epidural hemorrhage on MRI. Risks/benefits discussed with son. Neurosurgery recommendations appreciated. Fracture is likely subacute in nature and does not appear unstable.  -Will d/c hard cervical collar in lieu of soft cervical collar based on recommendations.  -MRI reviewed - no definitive evidence for epidural hematoma  -Outpatient neurosurgery follow-up

## 2020-07-27 NOTE — PROGRESS NOTE ADULT - PROBLEM SELECTOR PLAN 2
-Severe dysfunction of LV noted on TTE.   -Resume diuretics    -Monitor Is and Os.  -Continue metoprolol    COPD:  -Continue symbicort BID in lieu of elipta (home medication).   -Duonebs PRN.  -Goal O2 sat 92-94% -Severe dysfunction of LV noted on TTE.   -Decrease lasix to 20mg PO daily  -Monitor Is and Os.  -Continue metoprolol

## 2020-07-28 VITALS — HEART RATE: 78 BPM | SYSTOLIC BLOOD PRESSURE: 106 MMHG | DIASTOLIC BLOOD PRESSURE: 63 MMHG

## 2020-07-28 DIAGNOSIS — S12.031A NONDISPLACED POSTERIOR ARCH FRACTURE OF FIRST CERVICAL VERTEBRA, INITIAL ENCOUNTER FOR CLOSED FRACTURE: ICD-10-CM

## 2020-07-28 DIAGNOSIS — N17.9 ACUTE KIDNEY FAILURE, UNSPECIFIED: ICD-10-CM

## 2020-07-28 LAB
ALBUMIN SERPL ELPH-MCNC: 2.6 G/DL — LOW (ref 3.3–5)
ALP SERPL-CCNC: 82 U/L — SIGNIFICANT CHANGE UP (ref 40–120)
ALT FLD-CCNC: 20 U/L — SIGNIFICANT CHANGE UP (ref 10–45)
ANION GAP SERPL CALC-SCNC: 13 MMOL/L — SIGNIFICANT CHANGE UP (ref 5–17)
ANION GAP SERPL CALC-SCNC: 14 MMOL/L — SIGNIFICANT CHANGE UP (ref 5–17)
AST SERPL-CCNC: 21 U/L — SIGNIFICANT CHANGE UP (ref 10–40)
BASOPHILS # BLD AUTO: 0.05 K/UL — SIGNIFICANT CHANGE UP (ref 0–0.2)
BASOPHILS NFR BLD AUTO: 0.5 % — SIGNIFICANT CHANGE UP (ref 0–2)
BILIRUB SERPL-MCNC: 0.2 MG/DL — SIGNIFICANT CHANGE UP (ref 0.2–1.2)
BUN SERPL-MCNC: 42 MG/DL — HIGH (ref 7–23)
BUN SERPL-MCNC: 44 MG/DL — HIGH (ref 7–23)
CALCIUM SERPL-MCNC: 8.8 MG/DL — SIGNIFICANT CHANGE UP (ref 8.4–10.5)
CALCIUM SERPL-MCNC: 8.8 MG/DL — SIGNIFICANT CHANGE UP (ref 8.4–10.5)
CHLORIDE SERPL-SCNC: 94 MMOL/L — LOW (ref 96–108)
CHLORIDE SERPL-SCNC: 95 MMOL/L — LOW (ref 96–108)
CO2 SERPL-SCNC: 32 MMOL/L — HIGH (ref 22–31)
CO2 SERPL-SCNC: 33 MMOL/L — HIGH (ref 22–31)
CREAT SERPL-MCNC: 1.26 MG/DL — SIGNIFICANT CHANGE UP (ref 0.5–1.3)
CREAT SERPL-MCNC: 1.46 MG/DL — HIGH (ref 0.5–1.3)
EOSINOPHIL # BLD AUTO: 0.33 K/UL — SIGNIFICANT CHANGE UP (ref 0–0.5)
EOSINOPHIL NFR BLD AUTO: 3.5 % — SIGNIFICANT CHANGE UP (ref 0–6)
GLUCOSE BLDC GLUCOMTR-MCNC: 127 MG/DL — HIGH (ref 70–99)
GLUCOSE SERPL-MCNC: 122 MG/DL — HIGH (ref 70–99)
GLUCOSE SERPL-MCNC: 166 MG/DL — HIGH (ref 70–99)
HCT VFR BLD CALC: 32.2 % — LOW (ref 34.5–45)
HGB BLD-MCNC: 9.3 G/DL — LOW (ref 11.5–15.5)
IMM GRANULOCYTES NFR BLD AUTO: 0.6 % — SIGNIFICANT CHANGE UP (ref 0–1.5)
LYMPHOCYTES # BLD AUTO: 1.82 K/UL — SIGNIFICANT CHANGE UP (ref 1–3.3)
LYMPHOCYTES # BLD AUTO: 19.1 % — SIGNIFICANT CHANGE UP (ref 13–44)
MAGNESIUM SERPL-MCNC: 2 MG/DL — SIGNIFICANT CHANGE UP (ref 1.6–2.6)
MCHC RBC-ENTMCNC: 24.6 PG — LOW (ref 27–34)
MCHC RBC-ENTMCNC: 28.9 GM/DL — LOW (ref 32–36)
MCV RBC AUTO: 85.2 FL — SIGNIFICANT CHANGE UP (ref 80–100)
MONOCYTES # BLD AUTO: 1.07 K/UL — HIGH (ref 0–0.9)
MONOCYTES NFR BLD AUTO: 11.3 % — SIGNIFICANT CHANGE UP (ref 2–14)
NEUTROPHILS # BLD AUTO: 6.18 K/UL — SIGNIFICANT CHANGE UP (ref 1.8–7.4)
NEUTROPHILS NFR BLD AUTO: 65 % — SIGNIFICANT CHANGE UP (ref 43–77)
NRBC # BLD: 0 /100 WBCS — SIGNIFICANT CHANGE UP (ref 0–0)
PHOSPHATE SERPL-MCNC: 4.2 MG/DL — SIGNIFICANT CHANGE UP (ref 2.5–4.5)
PLATELET # BLD AUTO: 283 K/UL — SIGNIFICANT CHANGE UP (ref 150–400)
POTASSIUM SERPL-MCNC: 3.5 MMOL/L — SIGNIFICANT CHANGE UP (ref 3.5–5.3)
POTASSIUM SERPL-MCNC: 3.7 MMOL/L — SIGNIFICANT CHANGE UP (ref 3.5–5.3)
POTASSIUM SERPL-SCNC: 3.5 MMOL/L — SIGNIFICANT CHANGE UP (ref 3.5–5.3)
POTASSIUM SERPL-SCNC: 3.7 MMOL/L — SIGNIFICANT CHANGE UP (ref 3.5–5.3)
PROT SERPL-MCNC: 5.5 G/DL — LOW (ref 6–8.3)
RBC # BLD: 3.78 M/UL — LOW (ref 3.8–5.2)
RBC # FLD: 16.8 % — HIGH (ref 10.3–14.5)
SODIUM SERPL-SCNC: 140 MMOL/L — SIGNIFICANT CHANGE UP (ref 135–145)
SODIUM SERPL-SCNC: 141 MMOL/L — SIGNIFICANT CHANGE UP (ref 135–145)
WBC # BLD: 9.51 K/UL — SIGNIFICANT CHANGE UP (ref 3.8–10.5)
WBC # FLD AUTO: 9.51 K/UL — SIGNIFICANT CHANGE UP (ref 3.8–10.5)

## 2020-07-28 PROCEDURE — 82553 CREATINE MB FRACTION: CPT

## 2020-07-28 PROCEDURE — 82803 BLOOD GASES ANY COMBINATION: CPT

## 2020-07-28 PROCEDURE — 85014 HEMATOCRIT: CPT

## 2020-07-28 PROCEDURE — 99239 HOSP IP/OBS DSCHRG MGMT >30: CPT | Mod: GC

## 2020-07-28 PROCEDURE — 93306 TTE W/DOPPLER COMPLETE: CPT

## 2020-07-28 PROCEDURE — 80048 BASIC METABOLIC PNL TOTAL CA: CPT

## 2020-07-28 PROCEDURE — 80053 COMPREHEN METABOLIC PANEL: CPT

## 2020-07-28 PROCEDURE — 83036 HEMOGLOBIN GLYCOSYLATED A1C: CPT

## 2020-07-28 PROCEDURE — 97110 THERAPEUTIC EXERCISES: CPT

## 2020-07-28 PROCEDURE — 97530 THERAPEUTIC ACTIVITIES: CPT

## 2020-07-28 PROCEDURE — 85027 COMPLETE CBC AUTOMATED: CPT

## 2020-07-28 PROCEDURE — 83735 ASSAY OF MAGNESIUM: CPT

## 2020-07-28 PROCEDURE — 92526 ORAL FUNCTION THERAPY: CPT

## 2020-07-28 PROCEDURE — 71260 CT THORAX DX C+: CPT

## 2020-07-28 PROCEDURE — 82565 ASSAY OF CREATININE: CPT

## 2020-07-28 PROCEDURE — 87040 BLOOD CULTURE FOR BACTERIA: CPT

## 2020-07-28 PROCEDURE — 83605 ASSAY OF LACTIC ACID: CPT

## 2020-07-28 PROCEDURE — 86769 SARS-COV-2 COVID-19 ANTIBODY: CPT

## 2020-07-28 PROCEDURE — 74177 CT ABD & PELVIS W/CONTRAST: CPT

## 2020-07-28 PROCEDURE — 84480 ASSAY TRIIODOTHYRONINE (T3): CPT

## 2020-07-28 PROCEDURE — 72125 CT NECK SPINE W/O DYE: CPT

## 2020-07-28 PROCEDURE — 71045 X-RAY EXAM CHEST 1 VIEW: CPT

## 2020-07-28 PROCEDURE — 82550 ASSAY OF CK (CPK): CPT

## 2020-07-28 PROCEDURE — 87150 DNA/RNA AMPLIFIED PROBE: CPT

## 2020-07-28 PROCEDURE — 83520 IMMUNOASSAY QUANT NOS NONAB: CPT

## 2020-07-28 PROCEDURE — 93005 ELECTROCARDIOGRAM TRACING: CPT

## 2020-07-28 PROCEDURE — 84436 ASSAY OF TOTAL THYROXINE: CPT

## 2020-07-28 PROCEDURE — 92610 EVALUATE SWALLOWING FUNCTION: CPT

## 2020-07-28 PROCEDURE — 94660 CPAP INITIATION&MGMT: CPT

## 2020-07-28 PROCEDURE — 87086 URINE CULTURE/COLONY COUNT: CPT

## 2020-07-28 PROCEDURE — 84100 ASSAY OF PHOSPHORUS: CPT

## 2020-07-28 PROCEDURE — 83690 ASSAY OF LIPASE: CPT

## 2020-07-28 PROCEDURE — 94640 AIRWAY INHALATION TREATMENT: CPT

## 2020-07-28 PROCEDURE — U0003: CPT

## 2020-07-28 PROCEDURE — 36600 WITHDRAWAL OF ARTERIAL BLOOD: CPT

## 2020-07-28 PROCEDURE — 84295 ASSAY OF SERUM SODIUM: CPT

## 2020-07-28 PROCEDURE — 87449 NOS EACH ORGANISM AG IA: CPT

## 2020-07-28 PROCEDURE — 84443 ASSAY THYROID STIM HORMONE: CPT

## 2020-07-28 PROCEDURE — 82330 ASSAY OF CALCIUM: CPT

## 2020-07-28 PROCEDURE — 87641 MR-STAPH DNA AMP PROBE: CPT

## 2020-07-28 PROCEDURE — 84484 ASSAY OF TROPONIN QUANT: CPT

## 2020-07-28 PROCEDURE — 82140 ASSAY OF AMMONIA: CPT

## 2020-07-28 PROCEDURE — 87640 STAPH A DNA AMP PROBE: CPT

## 2020-07-28 PROCEDURE — 82962 GLUCOSE BLOOD TEST: CPT

## 2020-07-28 PROCEDURE — 83880 ASSAY OF NATRIURETIC PEPTIDE: CPT

## 2020-07-28 PROCEDURE — 82947 ASSAY GLUCOSE BLOOD QUANT: CPT

## 2020-07-28 PROCEDURE — 84132 ASSAY OF SERUM POTASSIUM: CPT

## 2020-07-28 PROCEDURE — 85610 PROTHROMBIN TIME: CPT

## 2020-07-28 PROCEDURE — 85730 THROMBOPLASTIN TIME PARTIAL: CPT

## 2020-07-28 PROCEDURE — 97162 PT EVAL MOD COMPLEX 30 MIN: CPT

## 2020-07-28 PROCEDURE — 70450 CT HEAD/BRAIN W/O DYE: CPT

## 2020-07-28 PROCEDURE — 82435 ASSAY OF BLOOD CHLORIDE: CPT

## 2020-07-28 PROCEDURE — 81001 URINALYSIS AUTO W/SCOPE: CPT

## 2020-07-28 PROCEDURE — 80307 DRUG TEST PRSMV CHEM ANLYZR: CPT

## 2020-07-28 PROCEDURE — 72141 MRI NECK SPINE W/O DYE: CPT

## 2020-07-28 RX ORDER — UMECLIDINIUM 62.5 UG/1
2 AEROSOL, POWDER ORAL
Qty: 1 | Refills: 0
Start: 2020-07-28 | End: 2020-08-26

## 2020-07-28 RX ORDER — BUDESONIDE AND FORMOTEROL FUMARATE DIHYDRATE 160; 4.5 UG/1; UG/1
2 AEROSOL RESPIRATORY (INHALATION)
Qty: 1 | Refills: 0
Start: 2020-07-28 | End: 2020-08-26

## 2020-07-28 RX ORDER — LISINOPRIL 2.5 MG/1
1 TABLET ORAL
Qty: 0 | Refills: 0 | DISCHARGE

## 2020-07-28 RX ORDER — METOPROLOL TARTRATE 50 MG
1 TABLET ORAL
Qty: 0 | Refills: 0 | DISCHARGE

## 2020-07-28 RX ORDER — SODIUM CHLORIDE 9 MG/ML
250 INJECTION INTRAMUSCULAR; INTRAVENOUS; SUBCUTANEOUS ONCE
Refills: 0 | Status: COMPLETED | OUTPATIENT
Start: 2020-07-28 | End: 2020-07-28

## 2020-07-28 RX ORDER — UMECLIDINIUM 62.5 UG/1
0 AEROSOL, POWDER ORAL
Qty: 0 | Refills: 0 | DISCHARGE

## 2020-07-28 RX ORDER — IPRATROPIUM BROMIDE 21 MCG
2 AEROSOL, SPRAY (ML) NASAL
Qty: 0 | Refills: 0 | DISCHARGE

## 2020-07-28 RX ORDER — METOPROLOL TARTRATE 50 MG
1 TABLET ORAL
Qty: 30 | Refills: 0
Start: 2020-07-28 | End: 2020-08-26

## 2020-07-28 RX ORDER — POTASSIUM CHLORIDE 20 MEQ
40 PACKET (EA) ORAL ONCE
Refills: 0 | Status: COMPLETED | OUTPATIENT
Start: 2020-07-28 | End: 2020-07-28

## 2020-07-28 RX ORDER — ALBUTEROL 90 UG/1
2 AEROSOL, METERED ORAL
Qty: 1 | Refills: 0
Start: 2020-07-28 | End: 2020-08-26

## 2020-07-28 RX ORDER — SODIUM CHLORIDE 9 MG/ML
250 INJECTION INTRAMUSCULAR; INTRAVENOUS; SUBCUTANEOUS
Refills: 0 | Status: DISCONTINUED | OUTPATIENT
Start: 2020-07-28 | End: 2020-07-28

## 2020-07-28 RX ORDER — ALBUTEROL 90 UG/1
0 AEROSOL, METERED ORAL
Qty: 0 | Refills: 0 | DISCHARGE

## 2020-07-28 RX ADMIN — RIVAROXABAN 20 MILLIGRAM(S): KIT at 12:21

## 2020-07-28 RX ADMIN — NYSTATIN CREAM 1 APPLICATION(S): 100000 CREAM TOPICAL at 05:20

## 2020-07-28 RX ADMIN — BUDESONIDE AND FORMOTEROL FUMARATE DIHYDRATE 2 PUFF(S): 160; 4.5 AEROSOL RESPIRATORY (INHALATION) at 05:42

## 2020-07-28 RX ADMIN — Medication 40 MILLIEQUIVALENT(S): at 14:49

## 2020-07-28 RX ADMIN — Medication 25 MILLIGRAM(S): at 05:24

## 2020-07-28 RX ADMIN — SODIUM CHLORIDE 250 MILLILITER(S): 9 INJECTION INTRAMUSCULAR; INTRAVENOUS; SUBCUTANEOUS at 11:42

## 2020-07-28 RX ADMIN — BUDESONIDE AND FORMOTEROL FUMARATE DIHYDRATE 2 PUFF(S): 160; 4.5 AEROSOL RESPIRATORY (INHALATION) at 17:11

## 2020-07-28 NOTE — PROGRESS NOTE ADULT - PROBLEM SELECTOR PLAN 2
- Severe dysfunction of LV noted on TTE.   - Continue Lasix 20mg PO daily  - Monitor Is and Os.  - Continue metoprolol - Severe dysfunction of LV noted on TTE.   - Discontinue Lasix inpatient. Recommend restarting Lasix 10 mg as outpatient.   - Monitor Is and Os.  - Continue metoprolol improved. O2 via NC with goal O2 sat 94%.  -For now, will continue O2 support during daytime and nighttime. Has remained off  BIPAP for >2 nights with stable O2 saturations while on supplemental O2. repeat ABG off BIPAP showing improved pCO2, thus does not qualify for NIV upon discharge.  - Pulm recs appreciated  - Discontinue Lasix inpatient. Recommend restarting Lasix 10 mg as outpatient.

## 2020-07-28 NOTE — PROVIDER CONTACT NOTE (OTHER) - BACKGROUND
CHF, COPD... DNR/DNI
admit w/ heart failure, hypercapnic resp failure
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

## 2020-07-28 NOTE — PROVIDER CONTACT NOTE (OTHER) - ASSESSMENT
Os sat of 96 with O2 at 3L. No s/s of distress noted.
no signs of distress noted
Pt A&Ox0-1, pt agitated, restless pulling at lines
VSS, no distress noted, Patient keeps pulling O2 lines causing her to desaturate.

## 2020-07-28 NOTE — PROGRESS NOTE ADULT - PROBLEM SELECTOR PLAN 4
- Likely 2/2 to diuresis and poor PO free water intake.  - Now improving 147 --> 142  - Encourage free water/PO intake.  -trend BMP - Likely 2/2 to diuresis and poor PO free water intake.  - improved.   - Encourage free water/PO intake.  -trend BMP stable.  - continue symbicort BID in lieu of elipta (home medication)  - c/w duonebs PRN while inpatient

## 2020-07-28 NOTE — PROGRESS NOTE ADULT - ASSESSMENT
80 Year old female, Farsi speaking, hx of chronic systolic congestive heart failure, obstructive sleep apnea, copd, schizoaffective disorder, pulmonary embolism presenting with lethargy and confusion. The patient presented in shock with hypercarbic respiratory failure. Stepped up to MICU for ventilatory and pressure support. Now stepped down to medical floor. Imaging notable for CT head demonstrating nondisplaced C1 fx and unable to R/O spinal epidural hematoma, CT chest with diffuse ground glass opacity suspicious for pneumonia or CHF. Labs notable for normal wbc, hypernatremia and hypercarbia with elevated bicarbonate. Now with labs showing improvement in hypercapnea and hypernatremia/elevated bicarb. Desats from last night have resolved. Her agitation is well-controlled on O2 and medication regiment. 80 Year old female, Farsi speaking, hx of chronic systolic congestive heart failure, obstructive sleep apnea, copd, schizoaffective disorder, pulmonary embolism presenting with lethargy and confusion. The patient presented in shock with hypercarbic respiratory failure. Stepped up to MICU for ventilatory and pressure support. Now stepped down to medical floor. Imaging notable for CT head demonstrating nondisplaced C1 fx and unable to R/O spinal epidural hematoma, CT chest with diffuse ground glass opacity suspicious for pneumonia or CHF. Labs notable for normal wbc, hypernatremia and hypercarbia with elevated bicarbonate. Now with labs showing improvement in hypercapnea and hypernatremia/elevated bicarb. Today she is clinically stable, but with uptrending BUN/Cr likely 2/2 dehydration. Her agitation is well-controlled on O2 and medication regiment. 80 year old  female, Farsi speaking, hx of chronic systolic congestive heart failure, obstructive sleep apnea, copd, schizoaffective disorder, pulmonary embolism presenting with lethargy and confusion. The patient presented in shock with hypercarbic respiratory failure. Stepped up to MICU for ventilatory and pressure support. Now stepped down to medical floor. Imaging notable for CT head demonstrating nondisplaced C1 fx and unable to R/O spinal epidural hematoma, CT chest with diffuse ground glass opacity suspicious for pneumonia or CHF. Labs notable for normal wbc, hypernatremia and hypercarbia with elevated bicarbonate. Now with labs showing improvement in hypercapnea and hypernatremia/elevated bicarb. Today she is clinically stable, but with uptrending BUN/Cr likely 2/2 dehydration. Her agitation is well-controlled on O2 and medication regimen

## 2020-07-28 NOTE — PROGRESS NOTE ADULT - ATTENDING COMMENTS
Dr. Livia De Jesus  Division of Hospital Medicine  [facility]   Pager: [phone]    Patient seen and examined today with Team 7 Resident, Interns, and MS3. Agree with above findings, assessment, and plan with the following additions/exceptions:    BP overall has been downtrending, though stable. BUN/Cr elevated to 44/1.46 improved with small 250cc bolus.  will hold lisinopril 2.5mg and lasix at this time. she will need to follow-up with her PCP and cardiologist outpatient with regards to resuming her lisinopril and lasix (ideally would be 10-20mg daily as she does not have sufficient free water intake). family will need to be vigilant with regards to helping her maintain adequate free water intake. this was all explained at length to son and primary HCP in multiple GOC conversations.     medically stable for discharge to home with home care and home O2.  will need outpatient follow-up with PCP and Cardiology.    Rest as detailed in note above. Dr. Livia De Jesus  Division of Hospital Medicine  [facility]   Pager: [phone]    Patient seen and examined today with Team 7 Resident, Interns, and MS3. Agree with above findings, assessment, and plan with the following additions/exceptions:    BP overall has been downtrending, though stable. BUN/Cr elevated to 44/1.46 improved with small 250cc bolus.  will hold lisinopril 2.5mg and lasix at this time. she will need to follow-up with her PCP and cardiologist outpatient with regards to resuming her lisinopril and lasix (ideally would be 10-20mg daily as she does not have sufficient free water intake). family will need to be vigilant with regards to helping her maintain adequate free water intake. this was all explained at length to son and primary HCP in multiple GOC conversations.     medically stable for discharge to home with home care and home O2.  will need outpatient follow-up with PCP and Cardiology.    Discharge planning time spent: 46 minutes.    Rest as detailed in note above.

## 2020-07-28 NOTE — PROGRESS NOTE ADULT - PROBLEM SELECTOR PLAN 5
appears back to baseline. which prior to admission was alert, oriented to self only, able to ambulate with assistance, requires assistance with feeding  -Continue management of acute medical issues as described.   -Limit sedatives/narcotics as able. Attempt to maintain sleep-wake cycle.  -Haloperidol PRN for agitation. Monitor QTc.  -Continue seroquel 25 mg PO qhs for intermittent agitation

## 2020-07-28 NOTE — PROGRESS NOTE ADULT - NSHPATTENDINGPLANDISCUSS_GEN_ALL_CORE
team
patient and team 7 interns
patient and team 7 resident Dr. Perera
patient and team 7 residents
patient and team 7 residents

## 2020-07-28 NOTE — PROGRESS NOTE ADULT - PROBLEM SELECTOR PLAN 10
-DNR/DNI in chart  -Patient with multiple advanced medical issues and symptoms.  -Palliative consult
-DNR/DNI in chart  -Patient with multiple advanced medical issues and symptoms.  -Palliative consult placed and patients son Miguel Angel made aware of pending [facility] discussion. Miguel Angel feels that his mother can return home to her previous state of health and function. He plans to visit today.
-DNR/DNI in chart  -Plan is for patient to return home with care from family members pending volume optimization, CM is following.  - Discuss goals of care with health care proxy  - Family declined hospice. Plan to go home w/ home care, O2, outpatient cardiology f/u   -Palliative care f/u appreciated
-DNR/DNI in chart  -Plan is for patient to return home with care from family members, cm is following
-DNR/DNI in chart  -Plan is for patient to return home with care from family members pending volume optimization, CM is following.  - Discuss goals of care with health care proxy  - Family declined hospice. Plan to go home w/ home care, O2, outpatient cardiology f/u   -Palliative care f/u appreciated

## 2020-07-28 NOTE — PROGRESS NOTE ADULT - PROBLEM SELECTOR PLAN 3
stable.  - continue symbicort BID in lieu of elipta (home medication)  - c/w ciraan PRN stable.  - continue symbicort BID in lieu of elipta (home medication)  - c/w duonebs PRN while inpatient - Severe dysfunction of LV noted on TTE.   - Discontinue Lasix inpatient. Recommend restarting Lasix 10 mg as outpatient.   - Continue Metoprolol 25 mg BID  - Discontinue Lisinopril 2.5 mg PO (BPs soft)  - Monitor Is/Os

## 2020-07-28 NOTE — PROGRESS NOTE ADULT - SUBJECTIVE AND OBJECTIVE BOX
PROGRESS NOTE:   ************************************************  Authored by: Steff Davis MD PGY1  Pager: [phone]  *************************************************    Patient is a 80y old  Female who presents with a chief complaint of AMS (27 Jul 2020 09:51)      SUBJECTIVE / OVERNIGHT EVENTS: Per RN report, overnight her SBP was 100 so her lisinopril and furosemide was held. She received metoprolol. She briefly became agitated, which resolved after RN increased her O2. This morning the patient was seen and examined at bedside. [employer]  was used: [id number]. Patient was sleeping comfortably and did not respond to questions.     REVIEW OF SYSTEMS:    CONSTITUTIONAL: No weakness, fevers or chills  EYES/ENT: No visual changes;  No vertigo or throat pain   NECK: No pain or stiffness  RESPIRATORY: No cough, wheezing, hemoptysis; No shortness of breath  CARDIOVASCULAR: No chest pain or palpitations  GASTROINTESTINAL: No abdominal or epigastric pain. No nausea, vomiting, or hematemesis; No diarrhea or constipation. No melena or hematochezia.  GENITOURINARY: No dysuria, frequency or hematuria  NEUROLOGICAL: No numbness or weakness  SKIN: No itching, rashes    MEDICATIONS  (STANDING):  budesonide 160 MICROgram(s)/formoterol 4.5 MICROgram(s) Inhaler 2 Puff(s) Inhalation two times a day  furosemide    Tablet 20 milliGRAM(s) Oral daily  lisinopril 2.5 milliGRAM(s) Oral daily  magnesium oxide 400 milliGRAM(s) Oral every 8 hours  metoprolol tartrate 25 milliGRAM(s) Oral two times a day  QUEtiapine 25 milliGRAM(s) Oral at bedtime  rivaroxaban 20 milliGRAM(s) Oral daily    MEDICATIONS  (PRN):  albuterol/ipratropium for Nebulization. 3 milliLiter(s) Nebulizer four times a day PRN Shortness of Breath and/or Wheezing  bisacodyl Suppository 10 milliGRAM(s) Rectal daily PRN Constipation  haloperidol    Injectable 1 milliGRAM(s) IntraMuscular every 6 hours PRN agitation            I&O's Summary    27 Jul 2020 07:01  -  28 Jul 2020 07:00  --------------------------------------------------------  IN: 800 mL / OUT: 700 mL / NET: 100 mL        PHYSICAL EXAM:  Vital Signs Last 24 Hrs  T(C): 36.7 (28 Jul 2020 04:03), Max: 36.7 (28 Jul 2020 04:03)  T(F): 98 (28 Jul 2020 04:03), Max: 98 (28 Jul 2020 04:03)  HR: 69 (28 Jul 2020 05:43) (69 - 96)  BP: 100/58 (28 Jul 2020 04:03) (96/59 - 113/66)  BP(mean): --  RR: 18 (28 Jul 2020 04:03) (18 - 18)  SpO2: 100% (28 Jul 2020 05:43) (93% - 100%)    TELEMETRY:    CONSTITUTIONAL: NAD, well-developed  RESPIRATORY: Normal respiratory effort; lungs are clear to auscultation bilaterally  CARDIOVASCULAR: Regular rate and rhythm, normal S1 and S2, soft systolic murmur at right upper sternal border; No lower extremity edema; Peripheral pulses are 2+ bilaterally  ABDOMEN: Nontender to palpation, distended, normoactive bowel sounds, no rebound/guarding; No hepatosplenomegaly but difficult to assess due to body habitus  MUSCLOSKELETAL: no clubbing or cyanosis of digits  PSYCH: asleep, difficult to arouse so could not assess    LABS:                        9.3    9.51  )-----------( 283      ( 28 Jul 2020 07:01 )             32.2     07-28    140  |  94<L>  |  44<H>  ----------------------------<  166<H>  3.7   |  32<H>  |  1.46<H>    Ca    8.8      28 Jul 2020 07:00  Phos  4.2     07-28  Mg     2.0     07-28    TPro  5.5<L>  /  Alb  2.6<L>  /  TBili  0.2  /  DBili  x   /  AST  21  /  ALT  20  /  AlkPhos  82  07-28        RADIOLOGY & ADDITIONAL TESTS: none    COORDINATION OF CARE:  Care Discussed with Consultants/Other Providers [Y/N]:  Prior or Outpatient Records Reviewed [Y/N]: PROGRESS NOTE:   ************************************************  Authored by: Steff Davis MD PGY1  Pager: [phone]  *************************************************    Patient is a 80y old  Female who presents with a chief complaint of AMS (27 Jul 2020 09:51)      SUBJECTIVE / OVERNIGHT EVENTS: Per RN report, overnight her lisinopril and furosemide were held for SBP of 100. She received metoprolol. She briefly became agitated, which resolved after RN increased her O2. This morning the patient was seen and examined at bedside. [employer]  was used: [id number]. Patient was sleeping comfortably and did not respond to questions.     REVIEW OF SYSTEMS:    CONSTITUTIONAL: No weakness, fevers or chills  EYES/ENT: No visual changes;  No vertigo or throat pain   NECK: No pain or stiffness  RESPIRATORY: No cough, wheezing, hemoptysis; No shortness of breath  CARDIOVASCULAR: No chest pain or palpitations  GASTROINTESTINAL: No abdominal or epigastric pain. No nausea, vomiting, or hematemesis; No diarrhea or constipation. No melena or hematochezia.  GENITOURINARY: No dysuria, frequency or hematuria  NEUROLOGICAL: No numbness or weakness  SKIN: No itching, rashes    MEDICATIONS  (STANDING):  budesonide 160 MICROgram(s)/formoterol 4.5 MICROgram(s) Inhaler 2 Puff(s) Inhalation two times a day  furosemide    Tablet 20 milliGRAM(s) Oral daily  lisinopril 2.5 milliGRAM(s) Oral daily  magnesium oxide 400 milliGRAM(s) Oral every 8 hours  metoprolol tartrate 25 milliGRAM(s) Oral two times a day  QUEtiapine 25 milliGRAM(s) Oral at bedtime  rivaroxaban 20 milliGRAM(s) Oral daily    MEDICATIONS  (PRN):  albuterol/ipratropium for Nebulization. 3 milliLiter(s) Nebulizer four times a day PRN Shortness of Breath and/or Wheezing  bisacodyl Suppository 10 milliGRAM(s) Rectal daily PRN Constipation  haloperidol    Injectable 1 milliGRAM(s) IntraMuscular every 6 hours PRN agitation            I&O's Summary    27 Jul 2020 07:01  -  28 Jul 2020 07:00  --------------------------------------------------------  IN: 800 mL / OUT: 700 mL / NET: 100 mL        PHYSICAL EXAM:  Vital Signs Last 24 Hrs  T(C): 36.7 (28 Jul 2020 04:03), Max: 36.7 (28 Jul 2020 04:03)  T(F): 98 (28 Jul 2020 04:03), Max: 98 (28 Jul 2020 04:03)  HR: 69 (28 Jul 2020 05:43) (69 - 96)  BP: 100/58 (28 Jul 2020 04:03) (96/59 - 113/66)  BP(mean): --  RR: 18 (28 Jul 2020 04:03) (18 - 18)  SpO2: 100% (28 Jul 2020 05:43) (93% - 100%)    TELEMETRY: None    CONSTITUTIONAL: NAD, well-developed  RESPIRATORY: Normal respiratory effort; lungs are clear to auscultation bilaterally  CARDIOVASCULAR: Regular rate and rhythm, normal S1 and S2, soft systolic murmur at right upper sternal border; No lower extremity edema; Peripheral pulses are 2+ bilaterally  ABDOMEN: Nontender to palpation, distended, normoactive bowel sounds, no rebound/guarding; No hepatosplenomegaly but difficult to assess due to body habitus  MUSCULOSKELETAL: no clubbing or cyanosis of digits  PSYCH: asleep, difficult to arouse so could not assess    LABS:                        9.3    9.51  )-----------( 283      ( 28 Jul 2020 07:01 )             32.2     07-28    140  |  94<L>  |  44<H>  ----------------------------<  166<H>  3.7   |  32<H>  |  1.46<H>    Ca    8.8      28 Jul 2020 07:00  Phos  4.2     07-28  Mg     2.0     07-28    TPro  5.5<L>  /  Alb  2.6<L>  /  TBili  0.2  /  DBili  x   /  AST  21  /  ALT  20  /  AlkPhos  82  07-28        RADIOLOGY & ADDITIONAL TESTS: none    COORDINATION OF CARE:  Care Discussed with Consultants/Other Providers [Y/N]:  Prior or Outpatient Records Reviewed [Y/N]:

## 2020-07-28 NOTE — PROVIDER CONTACT NOTE (OTHER) - SITUATION
RN aware of the revised order for free water via NGT, but pt does not have an NGT. She pulled it out overnight.
patient to receive bolus for elevated kidney functions. questioned because of underlying CHF and eventual discharge home today
Patient SpO2 74%, pt pulling O2 lines
Pt given haldol for agitation. Continues to remove bipap
Pt received PRN haldol @ 19:47 due to agitation & pulling lines. Pt continues to be agitated, pulling lines, removing C-collar, unable to be redirected

## 2020-07-28 NOTE — PROVIDER CONTACT NOTE (OTHER) - ACTION/TREATMENT ORDERED:
MD to review and consider plan
MD notified, no change in treatment. Will monitor.
MD made aware, ordered chest x-ray.
MD made aware. MD to discuss alternative medications and/or interventions w/ senior.
bolus given. will continue to monitor

## 2020-07-28 NOTE — PROGRESS NOTE ADULT - PROBLEM SELECTOR PLAN 1
improved. O2 via NC with goal O2 sat 94%.  -For now, will continue O2 support during daytime and nighttime. Has remained off  BIPAP for >2 nights with stable O2 saturations while on supplemental O2. repeat ABG off BIPAP showing improved pCO2, thus does not qualify for NIV upon discharge.  - Pulm recs appreciated  - C/w lasix 20mg PO daily, which she will be discharged on - elevated BUN/Cr today, likely due dehydration 2/2 recent diuresis and poor PO intake  - Will hold lasix. Recommend restarting lasix outpatient.   - Temporary IV placement for 250cc bolus over 1hr  - Repeat BMP at pm to trend BUN/Cr  - Bedside help with PO fluid intake  - Still stable for discharge and can follow up outpatient

## 2020-07-28 NOTE — PROGRESS NOTE ADULT - PROBLEM SELECTOR PROBLEM 2
Acute on chronic systolic congestive heart failure Acute on chronic respiratory failure with hypercapnia

## 2020-08-21 PROBLEM — J44.9 CHRONIC OBSTRUCTIVE PULMONARY DISEASE, UNSPECIFIED: Chronic | Status: ACTIVE | Noted: 2020-07-15

## 2020-08-21 PROBLEM — Z00.00 ENCOUNTER FOR PREVENTIVE HEALTH EXAMINATION: Status: ACTIVE | Noted: 2020-08-21

## 2020-08-21 PROBLEM — I50.9 HEART FAILURE, UNSPECIFIED: Chronic | Status: ACTIVE | Noted: 2020-07-15

## 2020-08-21 PROBLEM — E11.9 TYPE 2 DIABETES MELLITUS WITHOUT COMPLICATIONS: Chronic | Status: ACTIVE | Noted: 2020-07-15

## 2020-08-21 PROBLEM — I10 ESSENTIAL (PRIMARY) HYPERTENSION: Chronic | Status: ACTIVE | Noted: 2020-07-15

## 2020-08-21 PROBLEM — F25.9 SCHIZOAFFECTIVE DISORDER, UNSPECIFIED: Chronic | Status: ACTIVE | Noted: 2020-07-15

## 2020-09-09 ENCOUNTER — APPOINTMENT (OUTPATIENT)
Dept: PULMONOLOGY | Facility: CLINIC | Age: 80
End: 2020-09-09

## 2020-09-14 ENCOUNTER — APPOINTMENT (OUTPATIENT)
Dept: ORTHOPEDIC SURGERY | Facility: CLINIC | Age: 80
End: 2020-09-14

## 2022-05-23 NOTE — SWALLOW BEDSIDE ASSESSMENT ADULT - SWALLOW EVAL: DIAGNOSIS
This is an 80F PMHx COPD (unknown level O2 at home), DM2, CHF, schizoaffective disorder brought in by relative due to altered mental status; found to be in hypoxemic and hypercapnic respiratory failure. Oropharyngeal swallow skills c/b poor orientation/reception and awareness of bolus trial(s). Pt persisted with lethargy and inability to participate in this assessment despite max cues; verbal and noxious.
This is an 80F PMHx COPD (unknown level O2 at home), DM2, CHF, schizoaffective disorder brought in by relative due to altered mental status; found to be in hypoxemic and hypercapnic respiratory failure. Re-assessment requested due to wish to liberalize diet as pt more awake, as per MD notation. Oropharyngeal swallow skills c/b adequate acceptance, orientation/reception of bolus, suspected reduced control of bolus leading to delay in the swallow response leading to overt s/s of aspiration w/ nectar thick liquids via tsp and honey thick liquids via cup. Of note, significant impulsivity present w/ self presentations leading to cough response via cup.
no

## 2025-06-20 NOTE — SWALLOW BEDSIDE ASSESSMENT ADULT - SWALLOW EVAL: ORAL MUSCULATURE
Problem: At Risk for Falls  Goal: Patient does not fall  Outcome: Monitoring/Evaluating progress  Goal: Patient takes action to control fall-related risks  Outcome: Monitoring/Evaluating progress     Problem: At Risk for Injury Due to Fall  Goal: Patient does not fall  Outcome: Monitoring/Evaluating progress  Goal: Takes action to control condition specific risks  Outcome: Monitoring/Evaluating progress  Goal: Verbalizes understanding of fall-related injury personal risks  Description: Document education using the patient education activity  Outcome: Monitoring/Evaluating progress     Problem: Impaired Physical Mobility  Goal: Functional status is maintained or returned to baseline during hospitalization  Outcome: Monitoring/Evaluating progress  Goal: Tolerates activity for discharge setting with no abnormal symptoms  Outcome: Monitoring/Evaluating progress     Problem: Skin Integrity Alteration  Goal: Skin remains intact with no new/deterioration of wound or pressure injury  Outcome: Monitoring/Evaluating progress  Goal: Participates in wound care activities  Outcome: Monitoring/Evaluating progress     
anomalies present/edentulous
edentulous/anomalies present

## 2025-07-22 NOTE — ED ADULT NURSE NOTE - CAS DISCH BELONGINGS RETURNED
Problem: Adult Inpatient Plan of Care  Goal: Plan of Care Review  Outcome: Progressing  Flowsheets (Taken 7/22/2025 0600)  Progress: improving  Outcome Evaluation: Patient is alert and oriented x 4, saturating well on 2L/min O2 support. LYNETTE done yesterday. Fall risk prevention protocol maintained. Planned to discharge to rehab once medically stable.  Plan of Care Reviewed With: patient   Goal Outcome Evaluation:  Plan of Care Reviewed With: patient        Progress: improving  Outcome Evaluation: Patient is alert and oriented x 4, saturating well on 2L/min O2 support. LYNETTE done yesterday. Fall risk prevention protocol maintained. Planned to discharge to rehab once medically stable.                              Not applicable